# Patient Record
Sex: FEMALE | Race: ASIAN | NOT HISPANIC OR LATINO | Employment: OTHER | URBAN - METROPOLITAN AREA
[De-identification: names, ages, dates, MRNs, and addresses within clinical notes are randomized per-mention and may not be internally consistent; named-entity substitution may affect disease eponyms.]

---

## 2017-02-09 ENCOUNTER — APPOINTMENT (OUTPATIENT)
Dept: RADIOLOGY | Facility: MEDICAL CENTER | Age: 62
End: 2017-02-09
Attending: ORTHOPAEDIC SURGERY
Payer: COMMERCIAL

## 2017-02-09 DIAGNOSIS — M25.562 LEFT KNEE PAIN, UNSPECIFIED CHRONICITY: ICD-10-CM

## 2017-02-09 PROCEDURE — 73721 MRI JNT OF LWR EXTRE W/O DYE: CPT | Mod: LT

## 2017-02-11 ENCOUNTER — HOSPITAL ENCOUNTER (OUTPATIENT)
Dept: LAB | Facility: MEDICAL CENTER | Age: 62
End: 2017-02-11
Attending: FAMILY MEDICINE
Payer: COMMERCIAL

## 2017-02-11 DIAGNOSIS — G31.84 MILD COGNITIVE IMPAIRMENT: ICD-10-CM

## 2017-02-11 DIAGNOSIS — E55.9 VITAMIN D INSUFFICIENCY: ICD-10-CM

## 2017-02-11 DIAGNOSIS — I77.9 RIGHT-SIDED CAROTID ARTERY DISEASE (HCC): ICD-10-CM

## 2017-02-11 DIAGNOSIS — E78.5 HYPERLIPIDEMIA: ICD-10-CM

## 2017-02-11 LAB
25(OH)D3 SERPL-MCNC: 43 NG/ML (ref 30–100)
ALBUMIN SERPL BCP-MCNC: 4.3 G/DL (ref 3.2–4.9)
ALBUMIN/GLOB SERPL: 1.3 G/DL
ALP SERPL-CCNC: 83 U/L (ref 30–99)
ALT SERPL-CCNC: 21 U/L (ref 2–50)
ANION GAP SERPL CALC-SCNC: 9 MMOL/L (ref 0–11.9)
AST SERPL-CCNC: 22 U/L (ref 12–45)
BASOPHILS # BLD AUTO: 0.01 K/UL (ref 0–0.12)
BASOPHILS NFR BLD AUTO: 0.2 % (ref 0–1.8)
BILIRUB SERPL-MCNC: 0.6 MG/DL (ref 0.1–1.5)
BUN SERPL-MCNC: 15 MG/DL (ref 8–22)
CALCIUM SERPL-MCNC: 10.1 MG/DL (ref 8.5–10.5)
CHLORIDE SERPL-SCNC: 102 MMOL/L (ref 96–112)
CHOLEST SERPL-MCNC: 146 MG/DL (ref 100–199)
CO2 SERPL-SCNC: 29 MMOL/L (ref 20–33)
CREAT SERPL-MCNC: 0.82 MG/DL (ref 0.5–1.4)
EOSINOPHIL # BLD: 0.01 K/UL (ref 0–0.51)
EOSINOPHIL NFR BLD AUTO: 0.2 % (ref 0–6.9)
ERYTHROCYTE [DISTWIDTH] IN BLOOD BY AUTOMATED COUNT: 40.9 FL (ref 35.9–50)
FOLATE SERPL-MCNC: >23.6 NG/ML
GLOBULIN SER CALC-MCNC: 3.4 G/DL (ref 1.9–3.5)
GLUCOSE SERPL-MCNC: 113 MG/DL (ref 65–99)
HCT VFR BLD AUTO: 48.3 % (ref 37–47)
HDLC SERPL-MCNC: 69 MG/DL
HGB BLD-MCNC: 15.1 G/DL (ref 12–16)
IMM GRANULOCYTES # BLD AUTO: 0.02 K/UL (ref 0–0.11)
IMM GRANULOCYTES NFR BLD AUTO: 0.5 % (ref 0–0.9)
LDLC SERPL CALC-MCNC: 53 MG/DL
LYMPHOCYTES # BLD: 1.35 K/UL (ref 1–4.8)
LYMPHOCYTES NFR BLD AUTO: 30.5 % (ref 22–41)
MCH RBC QN AUTO: 30 PG (ref 27–33)
MCHC RBC AUTO-ENTMCNC: 31.3 G/DL (ref 33.6–35)
MCV RBC AUTO: 95.8 FL (ref 81.4–97.8)
MONOCYTES # BLD: 0.5 K/UL (ref 0–0.85)
MONOCYTES NFR BLD AUTO: 11.3 % (ref 0–13.4)
NEUTROPHILS # BLD: 2.54 K/UL (ref 2–7.15)
NEUTROPHILS NFR BLD AUTO: 57.3 % (ref 44–72)
NRBC # BLD AUTO: 0 K/UL
NRBC BLD-RTO: 0 /100 WBC
PLATELET # BLD AUTO: 283 K/UL (ref 164–446)
PMV BLD AUTO: 10 FL (ref 9–12.9)
POTASSIUM SERPL-SCNC: 4.1 MMOL/L (ref 3.6–5.5)
PROT SERPL-MCNC: 7.7 G/DL (ref 6–8.2)
RBC # BLD AUTO: 5.04 M/UL (ref 4.2–5.4)
SODIUM SERPL-SCNC: 140 MMOL/L (ref 135–145)
TRIGL SERPL-MCNC: 120 MG/DL (ref 0–149)
TSH SERPL DL<=0.005 MIU/L-ACNC: 3.66 UIU/ML (ref 0.3–3.7)
VIT B12 SERPL-MCNC: 416 PG/ML (ref 211–911)
WBC # BLD AUTO: 4.4 K/UL (ref 4.8–10.8)

## 2017-02-11 PROCEDURE — 84443 ASSAY THYROID STIM HORMONE: CPT

## 2017-02-11 PROCEDURE — 80053 COMPREHEN METABOLIC PANEL: CPT

## 2017-02-11 PROCEDURE — 82306 VITAMIN D 25 HYDROXY: CPT

## 2017-02-11 PROCEDURE — 36415 COLL VENOUS BLD VENIPUNCTURE: CPT

## 2017-02-11 PROCEDURE — 82607 VITAMIN B-12: CPT

## 2017-02-11 PROCEDURE — 82746 ASSAY OF FOLIC ACID SERUM: CPT

## 2017-02-11 PROCEDURE — 80061 LIPID PANEL: CPT

## 2017-02-11 PROCEDURE — 85025 COMPLETE CBC W/AUTO DIFF WBC: CPT

## 2017-02-22 ENCOUNTER — OFFICE VISIT (OUTPATIENT)
Dept: MEDICAL GROUP | Facility: MEDICAL CENTER | Age: 62
End: 2017-02-22
Payer: COMMERCIAL

## 2017-02-22 VITALS
TEMPERATURE: 98 F | HEIGHT: 66 IN | BODY MASS INDEX: 31.82 KG/M2 | SYSTOLIC BLOOD PRESSURE: 102 MMHG | OXYGEN SATURATION: 97 % | WEIGHT: 198 LBS | DIASTOLIC BLOOD PRESSURE: 68 MMHG | RESPIRATION RATE: 16 BRPM | HEART RATE: 94 BPM

## 2017-02-22 DIAGNOSIS — M25.562 CHRONIC PAIN OF LEFT KNEE: ICD-10-CM

## 2017-02-22 DIAGNOSIS — E78.00 PURE HYPERCHOLESTEROLEMIA: Chronic | ICD-10-CM

## 2017-02-22 DIAGNOSIS — M85.80 OSTEOPENIA: ICD-10-CM

## 2017-02-22 DIAGNOSIS — G89.29 CHRONIC PAIN OF LEFT KNEE: ICD-10-CM

## 2017-02-22 DIAGNOSIS — I25.10 CORONARY ARTERY DISEASE INVOLVING NATIVE CORONARY ARTERY OF NATIVE HEART WITHOUT ANGINA PECTORIS: Chronic | ICD-10-CM

## 2017-02-22 DIAGNOSIS — Z12.31 ENCOUNTER FOR SCREENING MAMMOGRAM FOR BREAST CANCER: ICD-10-CM

## 2017-02-22 DIAGNOSIS — Z00.00 ANNUAL PHYSICAL EXAM: ICD-10-CM

## 2017-02-22 DIAGNOSIS — Z87.81 S/P ORIF (OPEN REDUCTION INTERNAL FIXATION) FRACTURE: ICD-10-CM

## 2017-02-22 DIAGNOSIS — R73.01 ELEVATED FASTING BLOOD SUGAR: ICD-10-CM

## 2017-02-22 DIAGNOSIS — E55.9 VITAMIN D INSUFFICIENCY: Chronic | ICD-10-CM

## 2017-02-22 DIAGNOSIS — E66.9 OBESITY (BMI 30.0-34.9): ICD-10-CM

## 2017-02-22 DIAGNOSIS — Z12.11 COLON CANCER SCREENING: ICD-10-CM

## 2017-02-22 DIAGNOSIS — K21.9 GASTROESOPHAGEAL REFLUX DISEASE, ESOPHAGITIS PRESENCE NOT SPECIFIED: Chronic | ICD-10-CM

## 2017-02-22 DIAGNOSIS — Z12.4 CERVICAL CANCER SCREENING: ICD-10-CM

## 2017-02-22 DIAGNOSIS — Z98.890 S/P ORIF (OPEN REDUCTION INTERNAL FIXATION) FRACTURE: ICD-10-CM

## 2017-02-22 PROCEDURE — 99396 PREV VISIT EST AGE 40-64: CPT | Performed by: FAMILY MEDICINE

## 2017-02-22 RX ORDER — ATORVASTATIN CALCIUM 20 MG/1
20 TABLET, FILM COATED ORAL
Qty: 90 TAB | Refills: 3 | Status: SHIPPED | OUTPATIENT
Start: 2017-02-22 | End: 2017-02-22 | Stop reason: SDUPTHER

## 2017-02-22 RX ORDER — ASPIRIN 81 MG/1
81 TABLET, CHEWABLE ORAL
COMMUNITY
End: 2020-03-02

## 2017-02-22 RX ORDER — ATORVASTATIN CALCIUM 20 MG/1
20 TABLET, FILM COATED ORAL
Qty: 90 TAB | Refills: 3 | Status: SHIPPED | OUTPATIENT
Start: 2017-02-22 | End: 2018-02-23 | Stop reason: SDUPTHER

## 2017-02-22 RX ORDER — ALENDRONATE SODIUM 70 MG/1
70 TABLET ORAL
Qty: 12 TAB | Refills: 3 | Status: SHIPPED | OUTPATIENT
Start: 2017-02-22 | End: 2017-02-22 | Stop reason: SDUPTHER

## 2017-02-22 RX ORDER — ALENDRONATE SODIUM 70 MG/1
70 TABLET ORAL
Qty: 12 TAB | Refills: 3 | Status: SHIPPED | OUTPATIENT
Start: 2017-02-22 | End: 2018-02-23 | Stop reason: SDUPTHER

## 2017-02-22 ASSESSMENT — PATIENT HEALTH QUESTIONNAIRE - PHQ9: CLINICAL INTERPRETATION OF PHQ2 SCORE: 0

## 2017-02-22 NOTE — MR AVS SNAPSHOT
"        Uzma Luciocourtney   2017 8:40 AM   Office Visit   MRN: [id number]    Department:  [facility]   Dept Phone:  [phone]    Description:  Female : 1955   Provider:  Nabil Cifuentes M.D.           Reason for Visit     Annual Exam           Allergies as of 2017     Allergen Noted Reactions    Nkda [No Known Drug Allergy] 2010         You were diagnosed with     Annual physical exam   [276928]       S/P ORIF (open reduction internal fixation) fracture   [802015]       Chronic pain of left knee   [060091]       Osteopenia   [742867]       Vitamin D insufficiency   [523296]       Pure hypercholesterolemia   [272.0.ICD-9-CM]       Elevated fasting blood sugar   [869261]       Obesity (BMI 30.0-34.9)   [624217]       Colon cancer screening   [714672]       Encounter for screening mammogram for breast cancer   [5151877]       Cervical cancer screening   [978571]       Gastroesophageal reflux disease, esophagitis presence not specified   [9815223]       Coronary artery disease involving native coronary artery of native heart without angina pectoris   [0091946]         Vital Signs     Blood Pressure Pulse Temperature Respirations Height Weight    102/68 mmHg 94 36.7 °C (98 °F) 16 1.676 m (5' 5.98\") 89.812 kg (198 lb)    Body Mass Index Oxygen Saturation Smoking Status             31.97 kg/m2 97% Former Smoker         Basic Information     Date Of Birth Sex Race Ethnicity Preferred Language    1955 Female Other Non- English      Problem List              ICD-10-CM Priority Class Noted - Resolved    Previous back surgery Z98.890   2009 - Present    H/O: hysterectomy Z90.710   2009 - Present    Left knee pain M25.562   3/26/2014 - Present    Hx of cataract surgery Z98.49   3/26/2014 - Present    Hyperlipidemia (Chronic) E78.5   3/26/2014 - Present    Hyperpigmentation of skin L81.9   10/28/2014 - Present    Rash R21   2015 - Present    Osteopenia M85.80   10/12/2015 - " Present    Obesity (BMI 30.0-34.9) E66.9   10/12/2015 - Present    Left sided sciatica M54.32   11/17/2015 - Present    Vitamin D insufficiency (Chronic) E55.9   2/12/2016 - Present    S/P ORIF (open reduction internal fixation) fracture Z96.7, Z87.81   12/2/2016 - Present    CAD (coronary artery disease) (Chronic) I25.10   12/5/2016 - Present    GERD (gastroesophageal reflux disease) (Chronic) K21.9   12/6/2016 - Present    Elevated fasting blood sugar R73.01   2/22/2017 - Present      Health Maintenance        Date Due Completion Dates    PAP SMEAR 9/8/2013 9/8/2010    MAMMOGRAM 3/8/2017 3/8/2016, 4/9/2014, 12/18/2012, 11/7/2011, 8/24/2010, 8/24/2010, 5/7/2009, 5/7/2009    COLONOSCOPY 12/6/2017 12/6/2007 (Done)    Override on 12/6/2007: Done    IMM DTaP/Tdap/Td Vaccine (2 - Td) 10/12/2025 10/12/2015            Current Immunizations     Hepatitis A Vaccine, Adult 9/26/2016, 10/12/2015    Hepatitis B Vaccine Recombivax (Adol/Adult) 9/26/2016, 12/11/2015, 10/12/2015    Influenza TIV (IM) 10/16/2013, 11/26/2012, 10/24/2011, 10/28/2010    Influenza Vaccine Quad Inj (Preserved) 10/12/2016, 10/12/2015, 10/28/2014 10:17 AM    SHINGLES VACCINE 9/24/2014 10:45 AM    Tdap Vaccine 10/12/2015      Below and/or attached are the medications your provider expects you to take. Review all of your home medications and newly ordered medications with your provider and/or pharmacist. Follow medication instructions as directed by your provider and/or pharmacist. Please keep your medication list with you and share with your provider. Update the information when medications are discontinued, doses are changed, or new medications (including over-the-counter products) are added; and carry medication information at all times in the event of emergency situations     Allergies:  NKDA - (reactions not documented)               Medications  Valid as of: February 22, 2017 - 10:04 AM    Generic Name Brand Name Tablet Size Instructions for use     Alendronate Sodium (Tab) FOSAMAX 70 MG Take 1 Tab by mouth every 7 days.        Aspirin (Chew Tab) ASA 81 MG Take 81 mg by mouth every day.        Atorvastatin Calcium (Tab) LIPITOR 20 MG Take 1 Tab by mouth every bedtime.        Calcium Carbonate-Vitamin D (Tab) Calcium Carbonate-Vitamin D 600-400 MG-UNIT Take 1 Tab by mouth 2 Times a Day.        Cetirizine HCl (Tab) ZYRTEC 10 MG Take 10 mg by mouth every day.        Cholecalciferol (Tab) cholecalciferol 1000 UNIT Take 2,000 Units by mouth every day.        Estrogens, Conjugated (Cream) PREMARIN 0.625 MG/GM Insert 0.5 g in vagina every day.        Omega-3 Fatty Acids   Take 1,500 mg by mouth every day.        .                 Medicines prescribed today were sent to:     [facility]/PHARMACY #[id number] - [place] [facility] [address] [facility] [email or web address] [facility]    [address] [facility] [place] [address]    Phone: [phone] Fax: [phone]    Open 24 Hours?: No    [facility] [place], [place] - [address] AT PORTAL TO REGISTERED [facility] SITES    [address] [facility] [address]    Phone: [phone] Fax: [phone]    Open 24 Hours?: No      Medication refill instructions:       If your prescription bottle indicates you have medication refills left, it is not necessary to call your provider’s office. Please contact your pharmacy and they will refill your medication.    If your prescription bottle indicates you do not have any refills left, you may request refills at any time through one of the following ways: The online [employer] system (except Urgent Care), by calling your provider’s office, or by asking your pharmacy to contact your provider’s office with a refill request. Medication refills are processed only during regular business hours and may not be available until the next business day. Your provider may request additional information or to have a follow-up visit with you prior to refilling your medication.   *Please Note: Medication refills are assigned a new Rx  number when refilled electronically. Your pharmacy may indicate that no refills were authorized even though a new prescription for the same medication is available at the pharmacy. Please request the medicine by name with the pharmacy before contacting your provider for a refill.        Your To Do List     Future Labs/Procedures Complete By Expires    CBC WITH DIFFERENTIAL  As directed 2/23/2018    COMP METABOLIC PANEL  As directed 2/23/2018    HEMOGLOBIN A1C  As directed 2/23/2018    LIPID PROFILE  As directed 2/23/2018    MA-SCREEN MAMMO W/CAD-BILAT  As directed 3/26/2018    TSH WITH REFLEX TO FT4  As directed 2/22/2018    VITAMIN D,25 HYDROXY  As directed 2/23/2018      Referral     A referral request has been sent to our patient care coordination department. Please allow 3-5 business days for us to process this request and contact you either by phone or mail. If you do not hear from us by the 5th business day, please call us at [phone].           Inbox Access Code: Activation code not generated  Current Inbox Status: Active
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
## 2017-04-18 ENCOUNTER — OFFICE VISIT (OUTPATIENT)
Dept: MEDICAL GROUP | Facility: MEDICAL CENTER | Age: 62
End: 2017-04-18
Payer: COMMERCIAL

## 2017-04-18 VITALS
TEMPERATURE: 97.6 F | DIASTOLIC BLOOD PRESSURE: 64 MMHG | WEIGHT: 196 LBS | OXYGEN SATURATION: 96 % | SYSTOLIC BLOOD PRESSURE: 104 MMHG | BODY MASS INDEX: 31.5 KG/M2 | HEART RATE: 116 BPM | HEIGHT: 66 IN

## 2017-04-18 DIAGNOSIS — J06.9 VIRAL URI WITH COUGH: ICD-10-CM

## 2017-04-18 DIAGNOSIS — Z91.09 ALLERGY TO POLLEN: ICD-10-CM

## 2017-04-18 PROCEDURE — 99214 OFFICE O/P EST MOD 30 MIN: CPT | Performed by: NURSE PRACTITIONER

## 2017-04-18 RX ORDER — CODEINE PHOSPHATE/GUAIFENESIN 10-100MG/5
5 LIQUID (ML) ORAL 3 TIMES DAILY PRN
Qty: 120 ML | Refills: 0 | Status: SHIPPED | OUTPATIENT
Start: 2017-04-18 | End: 2018-02-23

## 2017-04-18 RX ORDER — BENZONATATE 100 MG/1
100 CAPSULE ORAL 3 TIMES DAILY PRN
Qty: 30 CAP | Refills: 0 | Status: SHIPPED | OUTPATIENT
Start: 2017-04-18 | End: 2018-02-23

## 2017-04-18 NOTE — MR AVS SNAPSHOT
"        Uzma Gamez   2017 1:45 PM   Office Visit   MRN: [id number]    Department:  [facility]   Dept Phone:  [phone]    Description:  Female : 1955   Provider:  ROZ Ackerman           Allergies as of 2017     Allergen Noted Reactions    Nkda [No Known Drug Allergy] 2010         You were diagnosed with     Viral URI with cough   [537286]   zyrtec or allergra daily with flonase for at least 3 weeks.  tessalon perles and soledad w/cod prn cough.  call by late thurs if any thick yellow secretions.      Allergy to pollen   [177811]   if has yellow secretions by thurs will order antibx.        Vital Signs     Blood Pressure Pulse Temperature Height Weight Body Mass Index    104/64 mmHg 116 36.4 °C (97.6 °F) 1.676 m (5' 6\") 88.905 kg (196 lb) 31.65 kg/m2    Oxygen Saturation Breastfeeding? Smoking Status             96% No Former Smoker         Basic Information     Date Of Birth Sex Race Ethnicity Preferred Language    1955 Female Other Non- English      Your appointments     2017 11:40 AM   MA SCRN10 with EUSEBIA SANDERS MG 1   [facility] ([place])    [address]  [facility] [id number] [phone]           No deodorant, powder, perfume or lotion under the arm or breast area.              Problem List              ICD-10-CM Priority Class Noted - Resolved    Previous back surgery Z98.890   2009 - Present    H/O: hysterectomy Z90.710   2009 - Present    Left knee pain M25.562   3/26/2014 - Present    Hx of cataract surgery Z98.49   3/26/2014 - Present    Hyperlipidemia (Chronic) E78.5   3/26/2014 - Present    Hyperpigmentation of skin L81.9   10/28/2014 - Present    Rash R21   2015 - Present    Osteopenia M85.80   10/12/2015 - Present    Obesity (BMI 30.0-34.9) E66.9   10/12/2015 - Present    Left sided sciatica M54.32   2015 - Present    Vitamin D insufficiency (Chronic) E55.9   " 2/12/2016 - Present    S/P ORIF (open reduction internal fixation) fracture Z96.7, Z87.81   12/2/2016 - Present    CAD (coronary artery disease) (Chronic) I25.10   12/5/2016 - Present    GERD (gastroesophageal reflux disease) (Chronic) K21.9   12/6/2016 - Present    Elevated fasting blood sugar R73.01   2/22/2017 - Present      Health Maintenance        Date Due Completion Dates    PAP SMEAR 9/8/2013 9/8/2010    MAMMOGRAM 3/8/2017 3/8/2016, 4/9/2014, 12/18/2012, 11/7/2011, 8/24/2010, 8/24/2010, 5/7/2009, 5/7/2009    COLONOSCOPY 12/6/2017 12/6/2007 (Done)    Override on 12/6/2007: Done    IMM DTaP/Tdap/Td Vaccine (2 - Td) 10/12/2025 10/12/2015            Current Immunizations     Hepatitis A Vaccine, Adult 9/26/2016, 10/12/2015    Hepatitis B Vaccine Recombivax (Adol/Adult) 9/26/2016, 12/11/2015, 10/12/2015    Influenza TIV (IM) 10/16/2013, 11/26/2012, 10/24/2011, 10/28/2010    Influenza Vaccine Quad Inj (Preserved) 10/12/2016, 10/12/2015, 10/28/2014 10:17 AM    SHINGLES VACCINE 9/24/2014 10:45 AM    Tdap Vaccine 10/12/2015      Below and/or attached are the medications your provider expects you to take. Review all of your home medications and newly ordered medications with your provider and/or pharmacist. Follow medication instructions as directed by your provider and/or pharmacist. Please keep your medication list with you and share with your provider. Update the information when medications are discontinued, doses are changed, or new medications (including over-the-counter products) are added; and carry medication information at all times in the event of emergency situations     Allergies:  NKDA - (reactions not documented)               Medications  Valid as of: April 18, 2017 -  2:26 PM    Generic Name Brand Name Tablet Size Instructions for use    Alendronate Sodium (Tab) FOSAMAX 70 MG Take 1 Tab by mouth every 7 days.        Aspirin (Chew Tab) ASA 81 MG Take 81 mg by mouth every day.        Atorvastatin  Calcium (Tab) LIPITOR 20 MG Take 1 Tab by mouth every bedtime.        Benzonatate (Cap) TESSALON 100 MG Take 1 Cap by mouth 3 times a day as needed.        Calcium Carbonate-Vitamin D (Tab) Calcium Carbonate-Vitamin D 600-400 MG-UNIT Take 1 Tab by mouth 2 Times a Day.        Cetirizine HCl (Tab) ZYRTEC 10 MG Take 10 mg by mouth every day.        Cholecalciferol (Tab) cholecalciferol 1000 UNIT Take 2,000 Units by mouth every day.        Estrogens, Conjugated (Cream) PREMARIN 0.625 MG/GM Insert 0.5 g in vagina every day.        Guaifenesin-Codeine (Syrup) TUSSI-ORGANIDIN -10 MG/5ML Take 5 mL by mouth 3 times a day as needed.        Omega-3 Fatty Acids   Take 1,500 mg by mouth every day.        .                 Medicines prescribed today were sent to:     [facility]/PHARMACY #[id number] - [place], [place] - [address]    [address] [place] [place] [address]    Phone: [phone] Fax: [phone]    Open 24 Hours?: No    [facility] - [place], [place] - [address] AT PORTAL TO REGISTERED [facility] SITES    [address] [facility] [address]    Phone: [phone] Fax: [phone]    Open 24 Hours?: No      Medication refill instructions:       If your prescription bottle indicates you have medication refills left, it is not necessary to call your provider’s office. Please contact your pharmacy and they will refill your medication.    If your prescription bottle indicates you do not have any refills left, you may request refills at any time through one of the following ways: The online [employer] system (except Urgent Care), by calling your provider’s office, or by asking your pharmacy to contact your provider’s office with a refill request. Medication refills are processed only during regular business hours and may not be available until the next business day. Your provider may request additional information or to have a follow-up visit with you prior to refilling your medication.   *Please Note:  Medication refills are assigned a new Rx number when refilled electronically. Your pharmacy may indicate that no refills were authorized even though a new prescription for the same medication is available at the pharmacy. Please request the medicine by name with the pharmacy before contacting your provider for a refill.        Instructions    1. Viral URI with cough  guaifenesin-codeine (TUSSI-ORGANIDIN NR) 100-10 MG/5ML syrup    benzonatate (TESSALON) 100 MG Cap    zyrtec or allergra daily with flonase for at least 3 weeks.  tessalon perles and soledad w/cod prn cough.  call by late thurs if any thick yellow secretions.     2. Allergy to pollen  guaifenesin-codeine (TUSSI-ORGANIDIN NR) 100-10 MG/5ML syrup    benzonatate (TESSALON) 100 MG Cap    if has yellow secretions by thurs will order antibx.               [employer] Access Code: Activation code not generated  Current [employer] Status: Active

## 2017-04-18 NOTE — PROGRESS NOTES
Subjective:      Uzma Gamez is a 62 y.o. female who presents with No chief complaint on file.            HPI  Seen in f/u for URI.  Started on sunday.  Was ill 1 month ago with same sx.  Lasted 3 weeks.  Then got better but now returned.  Was only better for a few days.    + aching all over.  Ribs hurt with the coughing.  Nasal secretions are clear.  + headache.  + sinus pressure and ear pressure.  + dry and sore throat.  + wheezing but no SOB.  Coughing all hte time.  ? Color of secretions.  No know fever.  + sweating yesterday.  + fatigue.  No n/v,d.      Patient Active Problem List    Diagnosis Date Noted   • Elevated fasting blood sugar 02/22/2017   • GERD (gastroesophageal reflux disease) 12/06/2016   • CAD (coronary artery disease) 12/05/2016   • S/P ORIF (open reduction internal fixation) fracture 12/02/2016   • Vitamin D insufficiency 02/12/2016   • Left sided sciatica 11/17/2015   • Osteopenia 10/12/2015   • Obesity (BMI 30.0-34.9) 10/12/2015   • Rash 08/28/2015   • Hyperpigmentation of skin 10/28/2014   • Left knee pain 03/26/2014   • Hx of cataract surgery 03/26/2014   • Hyperlipidemia 03/26/2014   • Previous back surgery 06/05/2009   • H/O: hysterectomy 06/05/2009     Current Outpatient Prescriptions   Medication Sig Dispense Refill   • aspirin (ASA) 81 MG Chew Tab chewable tablet Take 81 mg by mouth every day.     • atorvastatin (LIPITOR) 20 MG Tab Take 1 Tab by mouth every bedtime. 90 Tab 3   • alendronate (FOSAMAX) 70 MG Tab Take 1 Tab by mouth every 7 days. 12 Tab 3   • Omega-3 Fatty Acids (OMEGA 3 PO) Take 1,500 mg by mouth every day.     • conjugated estrogen (PREMARIN) 0.625 MG/GM Cream Insert 0.5 g in vagina every day.     • cetirizine (ZYRTEC) 10 MG Tab Take 10 mg by mouth every day.     • Calcium Carbonate-Vitamin D 600-400 MG-UNIT Tab Take 1 Tab by mouth 2 Times a Day.     • vitamin D (CHOLECALCIFEROL) 1000 UNIT Tab Take 2,000 Units by mouth every day.       No current  "facility-administered medications for this visit.     Allergies   Allergen Reactions   • Nkda [No Known Drug Allergy]        ROS  Review of Systems   Constitutional: Negative.  Negative for fever, weight loss.   HENT: Negative.  Negative for hearing loss, ear pain, nosebleeds, neck pain, tinnitus and ear discharge.    Respiratory: Negative.  Negative for hemoptysis, wheezing and stridor.    Cardiovascular: Negative.  Negative for chest pain, palpitations, orthopnea, claudication, leg swelling and PND.   Gastrointestinal: denies nausea, vomiting, diarrhea, constipation, heartburn, melena or hematochezia.  Genitourinary: Denies dysuria, hematuria, urinary incontinence, frequency or urgency.             Objective:     /64 mmHg  Pulse 116  Temp(Src) 36.4 °C (97.6 °F)  Ht 1.676 m (5' 6\")  Wt 88.905 kg (196 lb)  BMI 31.65 kg/m2  SpO2 96%  Breastfeeding? No     Physical Exam      Physical Exam   Vitals reviewed.  Constitutional: oriented to person, place, and time. appears well-developed and well-nourished. No distress.   HENT:  Head: Normocephalic and atraumatic. Right Ear: External ear normal. Left Ear: External ear normal. Nose: Nose normal. Mouth/Throat: Oropharynx is clear, red and moist. No oropharyngeal exudate.  johann tm wnl.  Eyes: Right eye exhibits no discharge. Left eye exhibits no discharge. No scleral icterus.  Neck: No JVD present.   Cardiovascular: Normal rate, regular rhythm, normal heart sounds and intact distal pulses.  Exam reveals no gallop and no friction rub.  No murmur heard.  No carotid bruits.   Pulmonary/Chest: Effort normal and breath sounds normal. No stridor. No respiratory distress. no wheezes or rales. exhibits no tenderness.   Musculoskeletal: Normal range of motion. exhibits no edema. johann pedal pulses 2+.  Lymphadenopathy: no supraclavicular adenopathy.  tender palp rt tonsillar lymph node.   Neurological: alert and oriented to person, place, and time. exhibits normal muscle " tone. Coordination normal.   Skin: Skin is warm and dry. no diaphoresis.   Psychiatric: normal mood and affect. behavior is normal.            Assessment/Plan:     1. Viral URI with cough  guaifenesin-codeine (TUSSI-ORGANIDIN NR) 100-10 MG/5ML syrup    benzonatate (TESSALON) 100 MG Cap    zyrtec or allergra daily with flonase for at least 3 weeks.  tessalon perles and soledad w/cod prn cough.  call by late thurs if any thick yellow secretions.     2. Allergy to pollen  guaifenesin-codeine (TUSSI-ORGANIDIN NR) 100-10 MG/5ML syrup    benzonatate (TESSALON) 100 MG Cap    if has yellow secretions by thurs will order antibx.

## 2017-04-18 NOTE — PATIENT INSTRUCTIONS
1. Viral URI with cough  guaifenesin-codeine (TUSSI-ORGANIDIN NR) 100-10 MG/5ML syrup    benzonatate (TESSALON) 100 MG Cap    zyrtec or allergra daily with flonase for at least 3 weeks.  tessalon perles and soledad w/cod prn cough.  call by late thurs if any thick yellow secretions.     2. Allergy to pollen  guaifenesin-codeine (TUSSI-ORGANIDIN NR) 100-10 MG/5ML syrup    benzonatate (TESSALON) 100 MG Cap    if has yellow secretions by thurs will order antibx.

## 2017-04-19 ENCOUNTER — TELEPHONE (OUTPATIENT)
Dept: MEDICAL GROUP | Facility: MEDICAL CENTER | Age: 62
End: 2017-04-19

## 2017-04-19 DIAGNOSIS — J01.80 OTHER ACUTE SINUSITIS: ICD-10-CM

## 2017-04-19 RX ORDER — AZITHROMYCIN 250 MG/1
TABLET, FILM COATED ORAL
Qty: 6 TAB | Refills: 0 | Status: SHIPPED | OUTPATIENT
Start: 2017-04-19 | End: 2017-04-24

## 2017-04-19 NOTE — TELEPHONE ENCOUNTER
1. Caller Name: Pt                      Call Back Number: [phone]    2. Message: Pt called stating she has thick green and yellow mucous now and [facility].     3. Patient approves office to leave a detailed voicemail/MyChart message: yes

## 2017-04-20 ENCOUNTER — HOSPITAL ENCOUNTER (OUTPATIENT)
Dept: RADIOLOGY | Facility: MEDICAL CENTER | Age: 62
End: 2017-04-20
Attending: FAMILY MEDICINE
Payer: COMMERCIAL

## 2017-04-20 DIAGNOSIS — Z12.31 ENCOUNTER FOR SCREENING MAMMOGRAM FOR BREAST CANCER: ICD-10-CM

## 2017-04-20 PROCEDURE — G0202 SCR MAMMO BI INCL CAD: HCPCS

## 2017-09-20 ENCOUNTER — NON-PROVIDER VISIT (OUTPATIENT)
Dept: MEDICAL GROUP | Facility: MEDICAL CENTER | Age: 62
End: 2017-09-20
Payer: COMMERCIAL

## 2017-09-20 DIAGNOSIS — Z23 NEEDS FLU SHOT: ICD-10-CM

## 2017-09-20 PROCEDURE — 90686 IIV4 VACC NO PRSV 0.5 ML IM: CPT | Performed by: NURSE PRACTITIONER

## 2017-09-20 PROCEDURE — 90471 IMMUNIZATION ADMIN: CPT | Performed by: NURSE PRACTITIONER

## 2017-09-20 NOTE — PROGRESS NOTES
"Uzma Gamez is a 62 y.o. female here for a non-provider visit for:   FLU    Reason for immunization: Annual Flu Vaccine  Immunization records indicate need for vaccine: Yes, confirmed with Epic  Minimum interval has been met for this vaccine: Yes  ABN completed: No    Order and dose verified by: RUTHANN GOLDSTEIN Dated  08/07/2015 was given to patient: Yes  All IAC Questionnaire questions were answered \"No.\"    Patient tolerated injection and no adverse effects were observed or reported: Yes    Pt scheduled for next dose in series: No    "

## 2017-10-24 ENCOUNTER — OFFICE VISIT (OUTPATIENT)
Dept: MEDICAL GROUP | Facility: MEDICAL CENTER | Age: 62
End: 2017-10-24
Payer: COMMERCIAL

## 2017-10-24 ENCOUNTER — HOSPITAL ENCOUNTER (OUTPATIENT)
Dept: LAB | Facility: MEDICAL CENTER | Age: 62
End: 2017-10-24
Attending: FAMILY MEDICINE
Payer: COMMERCIAL

## 2017-10-24 ENCOUNTER — HOSPITAL ENCOUNTER (OUTPATIENT)
Dept: RADIOLOGY | Facility: MEDICAL CENTER | Age: 62
End: 2017-10-24
Attending: FAMILY MEDICINE
Payer: COMMERCIAL

## 2017-10-24 VITALS
BODY MASS INDEX: 33.82 KG/M2 | OXYGEN SATURATION: 95 % | RESPIRATION RATE: 16 BRPM | HEIGHT: 66 IN | HEART RATE: 79 BPM | DIASTOLIC BLOOD PRESSURE: 80 MMHG | SYSTOLIC BLOOD PRESSURE: 122 MMHG | WEIGHT: 210.4 LBS | TEMPERATURE: 98.1 F

## 2017-10-24 DIAGNOSIS — M25.561 ACUTE PAIN OF RIGHT KNEE: ICD-10-CM

## 2017-10-24 DIAGNOSIS — M25.551 RIGHT HIP PAIN: ICD-10-CM

## 2017-10-24 LAB
BASOPHILS # BLD AUTO: 0.8 % (ref 0–1.8)
BASOPHILS # BLD: 0.06 K/UL (ref 0–0.12)
EOSINOPHIL # BLD AUTO: 0.23 K/UL (ref 0–0.51)
EOSINOPHIL NFR BLD: 3.1 % (ref 0–6.9)
ERYTHROCYTE [DISTWIDTH] IN BLOOD BY AUTOMATED COUNT: 39.6 FL (ref 35.9–50)
HCT VFR BLD AUTO: 44.9 % (ref 37–47)
HGB BLD-MCNC: 14.3 G/DL (ref 12–16)
IMM GRANULOCYTES # BLD AUTO: 0.02 K/UL (ref 0–0.11)
IMM GRANULOCYTES NFR BLD AUTO: 0.3 % (ref 0–0.9)
LYMPHOCYTES # BLD AUTO: 2.49 K/UL (ref 1–4.8)
LYMPHOCYTES NFR BLD: 33.8 % (ref 22–41)
MCH RBC QN AUTO: 30 PG (ref 27–33)
MCHC RBC AUTO-ENTMCNC: 31.8 G/DL (ref 33.6–35)
MCV RBC AUTO: 94.3 FL (ref 81.4–97.8)
MONOCYTES # BLD AUTO: 0.57 K/UL (ref 0–0.85)
MONOCYTES NFR BLD AUTO: 7.7 % (ref 0–13.4)
NEUTROPHILS # BLD AUTO: 3.99 K/UL (ref 2–7.15)
NEUTROPHILS NFR BLD: 54.3 % (ref 44–72)
NRBC # BLD AUTO: 0 K/UL
NRBC BLD AUTO-RTO: 0 /100 WBC
PLATELET # BLD AUTO: 352 K/UL (ref 164–446)
PMV BLD AUTO: 10.1 FL (ref 9–12.9)
RBC # BLD AUTO: 4.76 M/UL (ref 4.2–5.4)
WBC # BLD AUTO: 7.4 K/UL (ref 4.8–10.8)

## 2017-10-24 PROCEDURE — 84550 ASSAY OF BLOOD/URIC ACID: CPT

## 2017-10-24 PROCEDURE — 36415 COLL VENOUS BLD VENIPUNCTURE: CPT

## 2017-10-24 PROCEDURE — 73564 X-RAY EXAM KNEE 4 OR MORE: CPT | Mod: RT

## 2017-10-24 PROCEDURE — 99214 OFFICE O/P EST MOD 30 MIN: CPT | Performed by: FAMILY MEDICINE

## 2017-10-24 PROCEDURE — 85025 COMPLETE CBC W/AUTO DIFF WBC: CPT

## 2017-10-24 PROCEDURE — 73502 X-RAY EXAM HIP UNI 2-3 VIEWS: CPT | Mod: RT

## 2017-10-24 PROCEDURE — 85652 RBC SED RATE AUTOMATED: CPT

## 2017-10-24 RX ORDER — METHYLPREDNISOLONE 4 MG/1
TABLET ORAL
Qty: 21 TAB | Refills: 0 | Status: SHIPPED | OUTPATIENT
Start: 2017-10-24 | End: 2018-02-23

## 2017-10-25 LAB
ERYTHROCYTE [SEDIMENTATION RATE] IN BLOOD BY WESTERGREN METHOD: 15 MM/HOUR (ref 0–30)
URATE SERPL-MCNC: 5.8 MG/DL (ref 1.9–8.2)

## 2017-10-31 NOTE — PROGRESS NOTES
Subjective:     Chief Complaint   Patient presents with   • Knee Pain       Uzma Gamez is a 62 y.o. female here today for evaluation and management of:    Acute pain of right knee  Patient complains of pain in the right knee over the last couple months. It seems to have worsened recently. Patient did have a ankle fracture on the left a year ago and was on crutches or in a boot for almost 6 months secondary to problems with the healing. Her body mechanics were very off during then. She denies any twisting injury. She denies any swelling or redness. No instability or locking.    Right hip pain  Patient complains of right hip pain. This is been going on for the last 6 months but seems to have worsened recently. She is unsure of its because her body mechanics were off when she was healing from her left ankle fracture.       Allergies   Allergen Reactions   • Nkda [No Known Drug Allergy]        Current medicines (including changes today)  Current Outpatient Prescriptions   Medication Sig Dispense Refill   • MethylPREDNISolone (MEDROL DOSEPAK) 4 MG Tablet Therapy Pack As directed on the packaging label. 21 Tab 0   • guaifenesin-codeine (TUSSI-ORGANIDIN NR) 100-10 MG/5ML syrup Take 5 mL by mouth 3 times a day as needed. 120 mL 0   • benzonatate (TESSALON) 100 MG Cap Take 1 Cap by mouth 3 times a day as needed. 30 Cap 0   • aspirin (ASA) 81 MG Chew Tab chewable tablet Take 81 mg by mouth every day.     • atorvastatin (LIPITOR) 20 MG Tab Take 1 Tab by mouth every bedtime. 90 Tab 3   • alendronate (FOSAMAX) 70 MG Tab Take 1 Tab by mouth every 7 days. 12 Tab 3   • Omega-3 Fatty Acids (OMEGA 3 PO) Take 1,500 mg by mouth every day.     • conjugated estrogen (PREMARIN) 0.625 MG/GM Cream Insert 0.5 g in vagina every day.     • cetirizine (ZYRTEC) 10 MG Tab Take 10 mg by mouth every day.     • Calcium Carbonate-Vitamin D 600-400 MG-UNIT Tab Take 1 Tab by mouth 2 Times a Day.     • vitamin D (CHOLECALCIFEROL) 1000 UNIT Tab Take  "2,000 Units by mouth every day.       No current facility-administered medications for this visit.        She  has a past medical history of Anesthesia; Bilateral cataracts; H/O: hysterectomy (6/5/2009); High cholesterol; Plantar fasciitis (6/5/2009); and Previous back surgery (6/5/2009). She also has no past medical history of Breast cancer (CMS-[facility]).    Patient Active Problem List    Diagnosis Date Noted   • Right hip pain 10/24/2017   • Elevated fasting blood sugar 02/22/2017   • GERD (gastroesophageal reflux disease) 12/06/2016   • CAD (coronary artery disease) 12/05/2016   • S/P ORIF (open reduction internal fixation) fracture 12/02/2016   • Vitamin D insufficiency 02/12/2016   • Left sided sciatica 11/17/2015   • Osteopenia 10/12/2015   • Obesity (BMI 30.0-34.9) 10/12/2015   • Rash 08/28/2015   • Hyperpigmentation of skin 10/28/2014   • Acute pain of right knee 03/26/2014   • Hx of cataract surgery 03/26/2014   • Hyperlipidemia 03/26/2014   • Previous back surgery 06/05/2009   • H/O: hysterectomy 06/05/2009       ROS   No fever or chills.  No nausea or vomiting.  No chest pain or palpitations.  No cough or SOB.  No pain with urination or hematuria.  No black or bloody stools.       Objective:     Blood pressure 122/80, pulse 79, temperature 36.7 °C (98.1 °F), resp. rate 16, height 1.676 m (5' 6\"), weight 95.4 kg (210 lb 6.4 oz), SpO2 95 %. Body mass index is 33.96 kg/m².   Physical Exam:  Well developed, well nourished.  Alert, oriented in no acute distress.  Eye contact is good, speech goal directed, affect calm  Eyes: conjunctiva non-injected, sclera non-icteric.  Knees: No erythema/edema/ecchymosis/effusion. Tenderness to palpation onMedial patella. Joint line tenderness bilaterally. Patellar grind test positive. Lachman's negative. Chang's negative. ROM intact. 5/5 strength bilaterally. 2+ deep tendon reflexes bilaterally.  Hip: Tenderness to palpation on laterally. Normal flexion, extension, but " decreased abduction and adduction ROM. No pain with axial load or internal rotation.    SPINE: No significant spinal curvature on forward bend. Mild tenderness in paraspinous muscles lumbar spine without current spasm. SLT negative. DTR 2+ patella, 1+ achilles bilaterally. Strength 5/5 proximal and distal LE.  No pain with stress of SI. Full hip ROM. Poor hamstring flexibility. No symptoms with axial loading.         Assessment and Plan:   The following treatment plan was discussed    1. Right hip pain  Refer to physical therapy. Consider x-rays if not improving. This sounds like there is some radicular pain. Medrol Dosepak as directed. Refer to physical therapy and if not improving orthopedics  - URIC ACID; Future  - WESTERGREN SED RATE; Future  - MethylPREDNISolone (MEDROL DOSEPAK) 4 MG Tablet Therapy Pack; As directed on the packaging label.  Dispense: 21 Tab; Refill: 0  - REFERRAL TO PHYSICAL THERAPY Reason for Therapy: Eval/Treat/Report  - CBC WITH DIFFERENTIAL; Future    2. Acute pain of right knee  See #1  - DX-KNEE COMPLETE 4+ RIGHT; Future  - URIC ACID; Future  - WESTERGREN SED RATE; Future  - MethylPREDNISolone (MEDROL DOSEPAK) 4 MG Tablet Therapy Pack; As directed on the packaging label.  Dispense: 21 Tab; Refill: 0  - REFERRAL TO PHYSICAL THERAPY Reason for Therapy: Eval/Treat/Report  - CBC WITH DIFFERENTIAL; Future    Any change or worsening of signs or symptoms, patient encouraged to follow-up or report to the emergency room for further evaluation. Patient understands and agrees.    Followup: Return if symptoms worsen or fail to improve.

## 2017-10-31 NOTE — ASSESSMENT & PLAN NOTE
Patient complains of right hip pain. This is been going on for the last 6 months but seems to have worsened recently. She is unsure of its because her body mechanics were off when she was healing from her left ankle fracture.

## 2017-10-31 NOTE — ASSESSMENT & PLAN NOTE
Patient complains of pain in the right knee over the last couple months. It seems to have worsened recently. Patient did have a ankle fracture on the left a year ago and was on crutches or in a boot for almost 6 months secondary to problems with the healing. Her body mechanics were very off during then. She denies any twisting injury. She denies any swelling or redness. No instability or locking.

## 2017-11-22 ENCOUNTER — OFFICE VISIT (OUTPATIENT)
Dept: MEDICAL GROUP | Facility: MEDICAL CENTER | Age: 62
End: 2017-11-22
Payer: COMMERCIAL

## 2017-11-22 VITALS
HEIGHT: 66 IN | BODY MASS INDEX: 33.75 KG/M2 | TEMPERATURE: 97.7 F | OXYGEN SATURATION: 93 % | WEIGHT: 210 LBS | HEART RATE: 95 BPM | SYSTOLIC BLOOD PRESSURE: 118 MMHG | DIASTOLIC BLOOD PRESSURE: 68 MMHG

## 2017-11-22 DIAGNOSIS — Z11.59 NEED FOR HEPATITIS C SCREENING TEST: ICD-10-CM

## 2017-11-22 DIAGNOSIS — M25.561 RIGHT MEDIAL KNEE PAIN: ICD-10-CM

## 2017-11-22 DIAGNOSIS — E66.9 OBESITY (BMI 30-39.9): ICD-10-CM

## 2017-11-22 DIAGNOSIS — Z12.11 COLON CANCER SCREENING: ICD-10-CM

## 2017-11-22 PROCEDURE — 20610 DRAIN/INJ JOINT/BURSA W/O US: CPT | Performed by: FAMILY MEDICINE

## 2017-11-22 PROCEDURE — 99214 OFFICE O/P EST MOD 30 MIN: CPT | Mod: 25 | Performed by: FAMILY MEDICINE

## 2017-11-22 RX ORDER — ESTRADIOL 0.1 MG/G
CREAM VAGINAL DAILY
COMMUNITY
End: 2018-07-31

## 2017-11-22 NOTE — PROGRESS NOTES
Subjective:   Uzma Gamez is a 62 y.o. female here today for right knee pain    Patient is complaining of medial right knee pain that is worse when walking. She had an x-ray done which showed only minimal osteoarthritis. Patient states that pain has been progressively increasing over the last several months. She was referred to physical therapy, but did not schedule an appointment because she states that the pain is severe and she is afraid that physical therapy will make it more severe.  She states she had a meniscus tear of the left knee previously and this pain feels similar. She had meniscus surgery which did not help the pain. Her left meniscus was finally treated successfully with a cortisone injection.      Current medicines (including changes today)  Current Outpatient Prescriptions   Medication Sig Dispense Refill   • estradiol (ESTRACE VAGINAL) 0.1 MG/GM vaginal cream Insert  in vagina every day.     • aspirin (ASA) 81 MG Chew Tab chewable tablet Take 81 mg by mouth every day.     • atorvastatin (LIPITOR) 20 MG Tab Take 1 Tab by mouth every bedtime. 90 Tab 3   • alendronate (FOSAMAX) 70 MG Tab Take 1 Tab by mouth every 7 days. 12 Tab 3   • Omega-3 Fatty Acids (OMEGA 3 PO) Take 1,500 mg by mouth every day.     • conjugated estrogen (PREMARIN) 0.625 MG/GM Cream Insert 0.5 g in vagina every day.     • cetirizine (ZYRTEC) 10 MG Tab Take 10 mg by mouth every day.     • Calcium Carbonate-Vitamin D 600-400 MG-UNIT Tab Take 1 Tab by mouth 2 Times a Day.     • vitamin D (CHOLECALCIFEROL) 1000 UNIT Tab Take 2,000 Units by mouth every day.     • MethylPREDNISolone (MEDROL DOSEPAK) 4 MG Tablet Therapy Pack As directed on the packaging label. 21 Tab 0   • guaifenesin-codeine (TUSSI-ORGANIDIN NR) 100-10 MG/5ML syrup Take 5 mL by mouth 3 times a day as needed. 120 mL 0   • benzonatate (TESSALON) 100 MG Cap Take 1 Cap by mouth 3 times a day as needed. 30 Cap 0     No current facility-administered medications for this  "visit.      She  has a past medical history of Anesthesia; Bilateral cataracts; H/O: hysterectomy (6/5/2009); High cholesterol; Plantar fasciitis (6/5/2009); and Previous back surgery (6/5/2009). She also has no past medical history of Breast cancer (CMS-[facility]).    ROS   No fever, positive right ankle pain, positive right lateral hip pain       Objective:     Blood pressure 118/68, pulse 95, temperature 36.5 °C (97.7 °F), height 1.676 m (5' 6\"), weight 95.3 kg (210 lb), SpO2 93 %. Body mass index is 33.89 kg/m².   Physical Exam:  Constitutional: Alert, no distress.  Skin: Warm, dry, good turgor, no rashes in visible areas.  Eye: Equal, round and reactive, conjunctiva clear, lids normal.  Psych: Alert and oriented x3, normal affect and mood.  Right knee: Positive medial effusion, positive tenderness proximal to the tibial plateau on the medial side, full range of movement, 5 out of 5 strength.      PROCEDURE NOTE.  Discussed risk and benefit and patient agrees to kenalog injection of right knee. The joint was prepped with betadine. A 25 guage needle was inserted into the anterior medial aspect of the joint. 1 cc of 2% lidocaine without epi and 1 cc of kenalog 40 was then injected into the joint. The area was cleaned with alcohol swab and band-aid was applied. Patient tolerated procedure well with no complications.        Assessment and Plan:   The following treatment plan was discussed    1. Right medial knee pain  Cortisone/lidocaine injection done today. If no improvement consider MRI and then possibly orthopedic referral.    2. Colon cancer screening  - REFERRAL TO GASTROENTEROLOGY    3. Need for hepatitis C screening test  - HEP C VIRUS ANTIBODY; Future    4. Obesity (BMI 30-39.9)  - Patient identified as having weight management issue.  Appropriate orders and counseling given.      Followup: Return if symptoms worsen or fail to improve.         "

## 2017-12-17 ENCOUNTER — OFFICE VISIT (OUTPATIENT)
Dept: URGENT CARE | Facility: CLINIC | Age: 62
End: 2017-12-17
Payer: COMMERCIAL

## 2017-12-17 ENCOUNTER — HOSPITAL ENCOUNTER (OUTPATIENT)
Facility: MEDICAL CENTER | Age: 62
End: 2017-12-17
Attending: PHYSICIAN ASSISTANT
Payer: COMMERCIAL

## 2017-12-17 VITALS
WEIGHT: 211 LBS | SYSTOLIC BLOOD PRESSURE: 120 MMHG | BODY MASS INDEX: 33.91 KG/M2 | OXYGEN SATURATION: 96 % | HEART RATE: 92 BPM | DIASTOLIC BLOOD PRESSURE: 80 MMHG | RESPIRATION RATE: 20 BRPM | TEMPERATURE: 97.2 F | HEIGHT: 66 IN

## 2017-12-17 DIAGNOSIS — N30.01 ACUTE CYSTITIS WITH HEMATURIA: ICD-10-CM

## 2017-12-17 LAB
APPEARANCE UR: NORMAL
BILIRUB UR STRIP-MCNC: NORMAL MG/DL
COLOR UR AUTO: NORMAL
GLUCOSE UR STRIP.AUTO-MCNC: NORMAL MG/DL
KETONES UR STRIP.AUTO-MCNC: NORMAL MG/DL
LEUKOCYTE ESTERASE UR QL STRIP.AUTO: NORMAL
NITRITE UR QL STRIP.AUTO: NORMAL
PH UR STRIP.AUTO: 8 [PH] (ref 5–8)
PROT UR QL STRIP: 30 MG/DL
RBC UR QL AUTO: NORMAL
SP GR UR STRIP.AUTO: 1.01
UROBILINOGEN UR STRIP-MCNC: NORMAL MG/DL

## 2017-12-17 PROCEDURE — 81002 URINALYSIS NONAUTO W/O SCOPE: CPT | Performed by: PHYSICIAN ASSISTANT

## 2017-12-17 PROCEDURE — 99214 OFFICE O/P EST MOD 30 MIN: CPT | Performed by: PHYSICIAN ASSISTANT

## 2017-12-17 PROCEDURE — 87086 URINE CULTURE/COLONY COUNT: CPT

## 2017-12-17 RX ORDER — PHENAZOPYRIDINE HYDROCHLORIDE 200 MG/1
200 TABLET, FILM COATED ORAL 3 TIMES DAILY PRN
Qty: 6 TAB | Refills: 0 | Status: SHIPPED | OUTPATIENT
Start: 2017-12-17 | End: 2018-02-23

## 2017-12-17 RX ORDER — NITROFURANTOIN 25; 75 MG/1; MG/1
100 CAPSULE ORAL EVERY 12 HOURS
Qty: 10 CAP | Refills: 0 | Status: SHIPPED | OUTPATIENT
Start: 2017-12-17 | End: 2017-12-22

## 2017-12-17 ASSESSMENT — ENCOUNTER SYMPTOMS
HEADACHES: 0
BACK PAIN: 0
FLANK PAIN: 0
FEVER: 0
MYALGIAS: 0
CHILLS: 0
DIZZINESS: 0
SHORTNESS OF BREATH: 0
NAUSEA: 1
COUGH: 0
ABDOMINAL PAIN: 0
VOMITING: 0
DIARRHEA: 0
PALPITATIONS: 0

## 2017-12-17 NOTE — PROGRESS NOTES
Subjective:      Uzma Gamez is a 62 y.o. female who presents with Dysuria            Dysuria    This is a new problem. Episode onset: 3 days. The problem occurs every urination. The quality of the pain is described as burning. The pain is moderate. There has been no fever. She is not sexually active. There is no history of pyelonephritis. Associated symptoms include frequency, hematuria, hesitancy, nausea and urgency. Pertinent negatives include no chills, flank pain or vomiting. Treatments tried: AZO. The treatment provided mild relief. There is no history of kidney stones, recurrent UTIs or a single kidney.         Past Medical History:   Diagnosis Date   • Anesthesia     headache   • Bilateral cataracts    • H/O: hysterectomy 6/5/2009   • High cholesterol    • Plantar fasciitis 6/5/2009   • Previous back surgery 6/5/2009     Past Surgical History:   Procedure Laterality Date   • ORTHOPEDIC OSTEOTOMY Left 8/10/2016    Procedure: ORTHOPEDIC OSTEOTOMY - FOR ULNAR SHORTENING OSTEOTOMY;  Surgeon: Jerrell Linn M.D.;  Location: SURGERY [facility];  Service:    • OTHER NEUROLOGICAL SURG  2003    back surgery   • CERVICAL LAMINECTOMY POSTERIOR     • HYSTERECTOMY LAPAROSCOPY     • HYSTERECTOMY, VAGINAL     • OTHER      bilat cataracts removal       Family History   Problem Relation Age of Onset   • Diabetes Mother    • Cancer Father      LUNG   • Diabetes Brother        Allergies   Allergen Reactions   • Nkda [No Known Drug Allergy]        Medications, Allergies, and current problem list reviewed today in Epic    Review of Systems   Constitutional: Negative for chills, fever and malaise/fatigue.   Respiratory: Negative for cough and shortness of breath.    Cardiovascular: Negative for chest pain, palpitations and leg swelling.   Gastrointestinal: Positive for nausea. Negative for abdominal pain, diarrhea and vomiting.   Genitourinary: Positive for dysuria, frequency, hematuria, hesitancy and urgency.  "Negative for flank pain.   Musculoskeletal: Negative for back pain and myalgias.   Skin: Negative for rash.   Neurological: Negative for dizziness and headaches.     All other systems reviewed and are negative.        Objective:     /80   Pulse 92   Temp 36.2 °C (97.2 °F)   Resp 20   Ht 1.676 m (5' 6\")   Wt 95.7 kg (211 lb)   SpO2 96%   BMI 34.06 kg/m²      Physical Exam   Constitutional: She is oriented to person, place, and time. She appears well-developed and well-nourished. No distress.   Eyes: Conjunctivae are normal.   Cardiovascular: Normal rate, regular rhythm and normal heart sounds.  Exam reveals no gallop and no friction rub.    No murmur heard.  Pulmonary/Chest: Effort normal and breath sounds normal. No respiratory distress. She has no wheezes. She has no rales.   Abdominal: Soft. Bowel sounds are normal. She exhibits no distension and no mass. There is tenderness (mild suprapubic tenderness). There is no rigidity, no rebound, no guarding and no CVA tenderness.   Neurological: She is alert and oriented to person, place, and time. No cranial nerve deficit.   Skin: Skin is warm and dry.   Psychiatric: She has a normal mood and affect. Her behavior is normal. Judgment and thought content normal.          UA: positive for leuks, nitrates,and blood     Assessment/Plan:     1. Acute cystitis with hematuria  POCT Urinalysis    Urine Culture    nitrofurantoin monohydr macro (MACROBID) 100 MG Cap    phenazopyridine (PYRIDIUM) 200 MG Tab       Current Outpatient Prescriptions:   •  nitrofurantoin monohydr macro (MACROBID) 100 MG Cap, Take 1 Cap by mouth every 12 hours for 5 days., Disp: 10 Cap, Rfl: 0  •  phenazopyridine (PYRIDIUM) 200 MG Tab, Take 1 Tab by mouth 3 times a day as needed., Disp: 6 Tab, Rfl: 0    Will culture urine and change tx plan accordingly.     Differential diagnoses, Supportive care, and indications for immediate follow-up discussed with patient.   Instructed to return to clinic " or nearest emergency department for any change in condition, further concerns, or worsening of symptoms.      The patient demonstrated a good understanding and agreed with the treatment plan.    Pia Cline P.A.-C.

## 2017-12-17 NOTE — PATIENT INSTRUCTIONS
Urinary Tract Infection  A urinary tract infection (UTI) can occur any place along the urinary tract. The tract includes the kidneys, ureters, bladder, and urethra. A type of germ called bacteria often causes a UTI. UTIs are often helped with antibiotic medicine.   HOME CARE   · If given, take antibiotics as told by your doctor. Finish them even if you start to feel better.  · Drink enough fluids to keep your pee (urine) clear or pale yellow.  · Avoid tea, drinks with caffeine, and bubbly (carbonated) drinks.  · Pee often. Avoid holding your pee in for a long time.  · Pee before and after having sex (intercourse).  · Wipe from front to back after you poop (bowel movement) if you are a woman. Use each tissue only once.  GET HELP RIGHT AWAY IF:   · You have back pain.  · You have lower belly (abdominal) pain.  · You have chills.  · You feel sick to your stomach (nauseous).  · You throw up (vomit).  · Your burning or discomfort with peeing does not go away.  · You have a fever.  · Your symptoms are not better in 3 days.  MAKE SURE YOU:   · Understand these instructions.  · Will watch your condition.  · Will get help right away if you are not doing well or get worse.     This information is not intended to replace advice given to you by your health care provider. Make sure you discuss any questions you have with your health care provider.     Document Released: 06/05/2009 Document Revised: 01/08/2016 Document Reviewed: 07/18/2013  [employer] Interactive Patient Education ©2016 [employer]

## 2017-12-19 LAB
BACTERIA UR CULT: NORMAL
SIGNIFICANT IND 70042: NORMAL
SITE SITE: NORMAL
SOURCE SOURCE: NORMAL

## 2018-01-09 ENCOUNTER — OFFICE VISIT (OUTPATIENT)
Dept: MEDICAL GROUP | Facility: MEDICAL CENTER | Age: 63
End: 2018-01-09
Payer: COMMERCIAL

## 2018-01-09 VITALS
HEART RATE: 89 BPM | OXYGEN SATURATION: 97 % | WEIGHT: 210.6 LBS | DIASTOLIC BLOOD PRESSURE: 54 MMHG | HEIGHT: 66 IN | SYSTOLIC BLOOD PRESSURE: 112 MMHG | BODY MASS INDEX: 33.85 KG/M2 | TEMPERATURE: 97.6 F

## 2018-01-09 DIAGNOSIS — R25.2 MUSCLE CRAMP, NOCTURNAL: ICD-10-CM

## 2018-01-09 DIAGNOSIS — J01.00 ACUTE NON-RECURRENT MAXILLARY SINUSITIS: ICD-10-CM

## 2018-01-09 DIAGNOSIS — H92.01 RIGHT EAR PAIN: ICD-10-CM

## 2018-01-09 PROCEDURE — 99214 OFFICE O/P EST MOD 30 MIN: CPT | Performed by: NURSE PRACTITIONER

## 2018-01-09 RX ORDER — FLUTICASONE PROPIONATE 50 MCG
1 SPRAY, SUSPENSION (ML) NASAL DAILY
COMMUNITY
End: 2018-07-31

## 2018-01-09 RX ORDER — AMOXICILLIN AND CLAVULANATE POTASSIUM 875; 125 MG/1; MG/1
1 TABLET, FILM COATED ORAL 2 TIMES DAILY
Qty: 20 TAB | Refills: 0 | Status: SHIPPED | OUTPATIENT
Start: 2018-01-09 | End: 2018-02-23

## 2018-01-09 NOTE — PROGRESS NOTES
"Subjective:     Chief Complaint   Patient presents with   • Otalgia     right side   • Muscle cramp     left calf cramping     Uzma Gamez is a 62 y.o. Female Established patient of Dr. schaffer here for evaluation of right ear pain, sinus congestion and pressure. She reports having had a typical \"cold\" about 2 weeks ago with sore throat, cough, congestion. Sore throat and cough has resolved but she continues to have significant nasal congestion and sinus pressure. Now she's had increasing pain in the right ear, pain on the right side with chewing, 6/10 at times and sharp. She is not using any over-the-counter medications. She denies fever, nausea, fatigue, shortness of breath. She has had some dull headaches. She states that she sees the dentist every 6 months and has not had any major dental issues.  She's also having difficulty with cramping in the left calf at night, waking her up. She's not tried any treatment. Episodes tend to last a few minutes and then resolve spontaneously. She's not had any leg swelling, difficulty walking, discoloration.     Current medicines (including changes today)  Current Outpatient Prescriptions   Medication Sig Dispense Refill   • fluticasone (FLONASE) 50 MCG/ACT nasal spray Spray 1 Spray in nose every day.     • amoxicillin-clavulanate (AUGMENTIN) 875-125 MG Tab Take 1 Tab by mouth 2 times a day. 20 Tab 0   • estradiol (ESTRACE VAGINAL) 0.1 MG/GM vaginal cream Insert  in vagina every day.     • aspirin (ASA) 81 MG Chew Tab chewable tablet Take 81 mg by mouth every day.     • atorvastatin (LIPITOR) 20 MG Tab Take 1 Tab by mouth every bedtime. 90 Tab 3   • Omega-3 Fatty Acids (OMEGA 3 PO) Take 1,500 mg by mouth every day.     • Calcium Carbonate-Vitamin D 600-400 MG-UNIT Tab Take 1 Tab by mouth 2 Times a Day.     • vitamin D (CHOLECALCIFEROL) 1000 UNIT Tab Take 2,000 Units by mouth every day.     • phenazopyridine (PYRIDIUM) 200 MG Tab Take 1 Tab by mouth 3 times a day as needed. 6 " "Tab 0   • MethylPREDNISolone (MEDROL DOSEPAK) 4 MG Tablet Therapy Pack As directed on the packaging label. 21 Tab 0   • guaifenesin-codeine (TUSSI-ORGANIDIN NR) 100-10 MG/5ML syrup Take 5 mL by mouth 3 times a day as needed. 120 mL 0   • benzonatate (TESSALON) 100 MG Cap Take 1 Cap by mouth 3 times a day as needed. 30 Cap 0   • alendronate (FOSAMAX) 70 MG Tab Take 1 Tab by mouth every 7 days. 12 Tab 3   • conjugated estrogen (PREMARIN) 0.625 MG/GM Cream Insert 0.5 g in vagina every day.     • cetirizine (ZYRTEC) 10 MG Tab Take 10 mg by mouth every day.       No current facility-administered medications for this visit.      She  has a past medical history of Anesthesia; Bilateral cataracts; H/O: hysterectomy (6/5/2009); High cholesterol; Plantar fasciitis (6/5/2009); and Previous back surgery (6/5/2009). She also has no past medical history of Breast cancer (CMS-[facility]).    ROS included above     Objective:     Blood pressure 112/54, pulse 89, temperature 36.4 °C (97.6 °F), height 1.676 m (5' 6\"), weight 95.5 kg (210 lb 9.6 oz), SpO2 97 %, not currently breastfeeding. Body mass index is 33.99 kg/m².     Physical Exam:  General: Alert, oriented in no acute distress.  Eye contact is good, speech is normal, affect calm  HEENT: Posterior oropharynx pink, moist, no lesions or exudate. Nares with yellow mucus. Left TM gray with good landmarks. Right TM with moderate clear effusion. Mild tenderness over the right maxillary sinus. No lymphadenopathy.  Lungs: clear to auscultation bilaterally, normal effort, no wheeze/ rhonchi/ rales.  CV: regular rate and rhythm, S1, S2, no murmur  MS: No tenderness over the left lower leg, no swelling or erythema  Ext: no edema, color normal, vascularity normal, temperature normal    Assessment and Plan:   The following treatment plan was discussed   1. Acute non-recurrent maxillary sinusitis  Acute congestion, sinus pressure, pain radiating to the right ear and tenderness over the right " maxillary sinus. Start Augmentin, advised to complete entire course of antibiotic. Add probiotic. Increase fluids, over-the-counter Mucinex if needed, she has Flonase at home and will begin using this as well. Follow-up if not gradually improving  fluticasone (FLONASE) 50 MCG/ACT nasal spray    amoxicillin-clavulanate (AUGMENTIN) 875-125 MG Tab   2. Right ear pain  As discussed above    3. Muscle cramp, nocturnal  Encouraged increase fluids, increase dietary sources of magnesium and potassium. Options discussed in detail. Stretching encouraged        Followup: One week if no improvement, sooner as needed         Please note that this dictation was created using voice recognition software. I have worked with consultants from the vendor as well as technical experts from [employer] to optimize the interface. I have made every reasonable attempt to correct obvious errors, but I expect that there are errors of grammar and possibly content that I did not discover before finalizing the note.

## 2018-01-16 ENCOUNTER — TELEPHONE (OUTPATIENT)
Dept: MEDICAL GROUP | Facility: MEDICAL CENTER | Age: 63
End: 2018-01-16

## 2018-01-16 DIAGNOSIS — M25.561 CHRONIC PAIN OF RIGHT KNEE: ICD-10-CM

## 2018-01-16 DIAGNOSIS — G89.29 CHRONIC PAIN OF RIGHT KNEE: ICD-10-CM

## 2018-01-18 ENCOUNTER — HOSPITAL ENCOUNTER (OUTPATIENT)
Dept: RADIOLOGY | Facility: MEDICAL CENTER | Age: 63
End: 2018-01-18
Attending: FAMILY MEDICINE
Payer: COMMERCIAL

## 2018-01-18 DIAGNOSIS — G89.29 CHRONIC PAIN OF RIGHT KNEE: ICD-10-CM

## 2018-01-18 DIAGNOSIS — M25.561 CHRONIC PAIN OF RIGHT KNEE: ICD-10-CM

## 2018-01-18 PROCEDURE — 73721 MRI JNT OF LWR EXTRE W/O DYE: CPT | Mod: RT

## 2018-01-30 ENCOUNTER — TELEPHONE (OUTPATIENT)
Dept: MEDICAL GROUP | Facility: MEDICAL CENTER | Age: 63
End: 2018-01-30

## 2018-01-30 DIAGNOSIS — S83.241A ACUTE MEDIAL MENISCUS TEAR OF RIGHT KNEE, INITIAL ENCOUNTER: ICD-10-CM

## 2018-01-30 NOTE — TELEPHONE ENCOUNTER
Patient called because she never got her results for her MRI     Please notify patient that she has a meniscus tear. If she is still continuing to have pain after physical therapy, we should refer her to an orthopedic surgeon for possible arthroscopic surgery.  Nabil Cifuentes M.D.     I have notified patient of results patient states she is in a lot of pain and would like to know if you can place an urgent referral to [facility] to Dr. Flo Cole

## 2018-02-12 LAB
BASOPHILS # BLD AUTO: 0.07 X10^3/UL (ref 0–0.1)
BASOPHILS NFR BLD AUTO: 1 % (ref 0–1)
CULTURE INDICATED?: NO
EOSINOPHIL # BLD AUTO: 0.33 X10^3/UL (ref 0–0.4)
EOSINOPHIL NFR BLD AUTO: 4 % (ref 1–7)
ERYTHROCYTE [DISTWIDTH] IN BLOOD BY AUTOMATED COUNT: 12.1 % (ref 9.6–15.2)
LYMPHOCYTES # BLD AUTO: 2.66 X10^3/UL (ref 1–3.4)
LYMPHOCYTES NFR BLD AUTO: 32 % (ref 22–44)
MCH RBC QN AUTO: 31.2 PG (ref 27–34.8)
MCHC RBC AUTO-ENTMCNC: 33.6 G/DL (ref 32.4–35.8)
MCV RBC AUTO: 92.7 FL (ref 80–100)
MD: NO
MICROSCOPIC: (no result)
MONOCYTES # BLD AUTO: 0.95 X10^3/UL (ref 0.2–0.8)
MONOCYTES NFR BLD AUTO: 12 % (ref 2–9)
NEUTROPHILS # BLD AUTO: 4.25 X10^3/UL (ref 1.8–6.8)
NEUTROPHILS NFR BLD AUTO: 51 % (ref 42–75)
PLATELET # BLD AUTO: 365 X10^3/UL (ref 130–400)
PMV BLD AUTO: 8.1 FL (ref 7.4–10.4)
RBC # BLD AUTO: 4.95 X10^6/UL (ref 3.82–5.3)

## 2018-02-15 ENCOUNTER — HOSPITAL ENCOUNTER (OUTPATIENT)
Dept: HOSPITAL 8 - OUT | Age: 63
End: 2018-02-15
Attending: ORTHOPAEDIC SURGERY
Payer: COMMERCIAL

## 2018-02-15 VITALS — DIASTOLIC BLOOD PRESSURE: 80 MMHG | SYSTOLIC BLOOD PRESSURE: 123 MMHG

## 2018-02-15 VITALS — HEIGHT: 66 IN | WEIGHT: 208.78 LBS | BODY MASS INDEX: 33.55 KG/M2
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
## 2018-02-15 DIAGNOSIS — S83.281A: ICD-10-CM

## 2018-02-15 DIAGNOSIS — E78.5: ICD-10-CM

## 2018-02-15 DIAGNOSIS — M94.261: ICD-10-CM
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
## 2018-02-15 DIAGNOSIS — S83.231A: Primary | ICD-10-CM

## 2018-02-15 PROCEDURE — 29880 ARTHRS KNE SRG MNISECTMY M&L: CPT

## 2018-02-15 PROCEDURE — 93005 ELECTROCARDIOGRAM TRACING: CPT

## 2018-02-15 PROCEDURE — 81003 URINALYSIS AUTO W/O SCOPE: CPT

## 2018-02-15 PROCEDURE — 36415 COLL VENOUS BLD VENIPUNCTURE: CPT

## 2018-02-15 PROCEDURE — 85025 COMPLETE CBC W/AUTO DIFF WBC: CPT

## 2018-02-15 RX ADMIN — FENTANYL CITRATE PRN MCG: 50 INJECTION INTRAMUSCULAR; INTRAVENOUS at 12:30

## 2018-02-15 RX ADMIN — EPINEPHRINE ONE MG: 1 INJECTION, SOLUTION INTRAMUSCULAR; SUBCUTANEOUS at 11:41

## 2018-02-15 RX ADMIN — FENTANYL CITRATE PRN MCG: 50 INJECTION INTRAMUSCULAR; INTRAVENOUS at 12:20

## 2018-02-15 RX ADMIN — EPINEPHRINE ONE MG: 1 INJECTION, SOLUTION INTRAMUSCULAR; SUBCUTANEOUS at 11:42

## 2018-02-15 RX ADMIN — FENTANYL CITRATE PRN MCG: 50 INJECTION INTRAMUSCULAR; INTRAVENOUS at 12:25

## 2018-02-20 ENCOUNTER — HOSPITAL ENCOUNTER (OUTPATIENT)
Dept: LAB | Facility: MEDICAL CENTER | Age: 63
End: 2018-02-20
Attending: FAMILY MEDICINE
Payer: COMMERCIAL

## 2018-02-20 DIAGNOSIS — R73.01 ELEVATED FASTING BLOOD SUGAR: ICD-10-CM

## 2018-02-20 DIAGNOSIS — E78.00 PURE HYPERCHOLESTEROLEMIA: Chronic | ICD-10-CM

## 2018-02-20 DIAGNOSIS — Z11.59 NEED FOR HEPATITIS C SCREENING TEST: ICD-10-CM

## 2018-02-20 DIAGNOSIS — I25.10 CORONARY ARTERY DISEASE INVOLVING NATIVE CORONARY ARTERY OF NATIVE HEART WITHOUT ANGINA PECTORIS: Chronic | ICD-10-CM

## 2018-02-20 DIAGNOSIS — E55.9 VITAMIN D INSUFFICIENCY: Chronic | ICD-10-CM

## 2018-02-20 LAB
25(OH)D3 SERPL-MCNC: 34 NG/ML (ref 30–100)
ALBUMIN SERPL BCP-MCNC: 3.9 G/DL (ref 3.2–4.9)
ALBUMIN/GLOB SERPL: 1.1 G/DL
ALP SERPL-CCNC: 60 U/L (ref 30–99)
ALT SERPL-CCNC: 13 U/L (ref 2–50)
ANION GAP SERPL CALC-SCNC: 5 MMOL/L (ref 0–11.9)
AST SERPL-CCNC: 14 U/L (ref 12–45)
BASOPHILS # BLD AUTO: 1 % (ref 0–1.8)
BASOPHILS # BLD: 0.06 K/UL (ref 0–0.12)
BILIRUB SERPL-MCNC: 0.7 MG/DL (ref 0.1–1.5)
BUN SERPL-MCNC: 23 MG/DL (ref 8–22)
CALCIUM SERPL-MCNC: 9.5 MG/DL (ref 8.5–10.5)
CHLORIDE SERPL-SCNC: 106 MMOL/L (ref 96–112)
CHOLEST SERPL-MCNC: 150 MG/DL (ref 100–199)
CO2 SERPL-SCNC: 30 MMOL/L (ref 20–33)
CREAT SERPL-MCNC: 0.87 MG/DL (ref 0.5–1.4)
EOSINOPHIL # BLD AUTO: 0.32 K/UL (ref 0–0.51)
EOSINOPHIL NFR BLD: 5.1 % (ref 0–6.9)
ERYTHROCYTE [DISTWIDTH] IN BLOOD BY AUTOMATED COUNT: 39.4 FL (ref 35.9–50)
EST. AVERAGE GLUCOSE BLD GHB EST-MCNC: 114 MG/DL
GLOBULIN SER CALC-MCNC: 3.5 G/DL (ref 1.9–3.5)
GLUCOSE SERPL-MCNC: 102 MG/DL (ref 65–99)
HBA1C MFR BLD: 5.6 % (ref 0–5.6)
HCT VFR BLD AUTO: 45.4 % (ref 37–47)
HCV AB SER QL: NEGATIVE
HDLC SERPL-MCNC: 69 MG/DL
HGB BLD-MCNC: 14.9 G/DL (ref 12–16)
IMM GRANULOCYTES # BLD AUTO: 0.02 K/UL (ref 0–0.11)
IMM GRANULOCYTES NFR BLD AUTO: 0.3 % (ref 0–0.9)
LDLC SERPL CALC-MCNC: 54 MG/DL
LYMPHOCYTES # BLD AUTO: 1.91 K/UL (ref 1–4.8)
LYMPHOCYTES NFR BLD: 30.5 % (ref 22–41)
MCH RBC QN AUTO: 31 PG (ref 27–33)
MCHC RBC AUTO-ENTMCNC: 32.8 G/DL (ref 33.6–35)
MCV RBC AUTO: 94.4 FL (ref 81.4–97.8)
MONOCYTES # BLD AUTO: 0.57 K/UL (ref 0–0.85)
MONOCYTES NFR BLD AUTO: 9.1 % (ref 0–13.4)
NEUTROPHILS # BLD AUTO: 3.38 K/UL (ref 2–7.15)
NEUTROPHILS NFR BLD: 54 % (ref 44–72)
NRBC # BLD AUTO: 0 K/UL
NRBC BLD-RTO: 0 /100 WBC
PLATELET # BLD AUTO: 343 K/UL (ref 164–446)
PMV BLD AUTO: 9.7 FL (ref 9–12.9)
POTASSIUM SERPL-SCNC: 4.3 MMOL/L (ref 3.6–5.5)
PROT SERPL-MCNC: 7.4 G/DL (ref 6–8.2)
RBC # BLD AUTO: 4.81 M/UL (ref 4.2–5.4)
SODIUM SERPL-SCNC: 141 MMOL/L (ref 135–145)
TRIGL SERPL-MCNC: 135 MG/DL (ref 0–149)
TSH SERPL DL<=0.005 MIU/L-ACNC: 1.58 UIU/ML (ref 0.38–5.33)
WBC # BLD AUTO: 6.3 K/UL (ref 4.8–10.8)

## 2018-02-20 PROCEDURE — 86803 HEPATITIS C AB TEST: CPT

## 2018-02-20 PROCEDURE — 85025 COMPLETE CBC W/AUTO DIFF WBC: CPT

## 2018-02-20 PROCEDURE — 36415 COLL VENOUS BLD VENIPUNCTURE: CPT

## 2018-02-20 PROCEDURE — 84443 ASSAY THYROID STIM HORMONE: CPT

## 2018-02-20 PROCEDURE — 82306 VITAMIN D 25 HYDROXY: CPT

## 2018-02-20 PROCEDURE — 80061 LIPID PANEL: CPT

## 2018-02-20 PROCEDURE — 80053 COMPREHEN METABOLIC PANEL: CPT

## 2018-02-20 PROCEDURE — 83036 HEMOGLOBIN GLYCOSYLATED A1C: CPT

## 2018-02-23 ENCOUNTER — OFFICE VISIT (OUTPATIENT)
Dept: MEDICAL GROUP | Facility: MEDICAL CENTER | Age: 63
End: 2018-02-23
Payer: COMMERCIAL

## 2018-02-23 VITALS
HEART RATE: 83 BPM | BODY MASS INDEX: 33.91 KG/M2 | HEIGHT: 66 IN | RESPIRATION RATE: 12 BRPM | WEIGHT: 211 LBS | SYSTOLIC BLOOD PRESSURE: 106 MMHG | DIASTOLIC BLOOD PRESSURE: 80 MMHG | OXYGEN SATURATION: 96 % | TEMPERATURE: 97.2 F

## 2018-02-23 DIAGNOSIS — M85.89 OSTEOPENIA OF MULTIPLE SITES: ICD-10-CM

## 2018-02-23 DIAGNOSIS — E66.9 OBESITY (BMI 30-39.9): ICD-10-CM

## 2018-02-23 DIAGNOSIS — Z98.890 S/P ORIF (OPEN REDUCTION INTERNAL FIXATION) FRACTURE: ICD-10-CM

## 2018-02-23 DIAGNOSIS — I25.10 CORONARY ARTERY DISEASE INVOLVING NATIVE CORONARY ARTERY OF NATIVE HEART WITHOUT ANGINA PECTORIS: Chronic | ICD-10-CM

## 2018-02-23 DIAGNOSIS — L81.9 HYPERPIGMENTATION OF SKIN: ICD-10-CM

## 2018-02-23 DIAGNOSIS — Z98.890 PREVIOUS BACK SURGERY: ICD-10-CM

## 2018-02-23 DIAGNOSIS — Z87.81 S/P ORIF (OPEN REDUCTION INTERNAL FIXATION) FRACTURE: ICD-10-CM

## 2018-02-23 DIAGNOSIS — G50.0 TRIGEMINAL NEURALGIA PAIN: ICD-10-CM

## 2018-02-23 DIAGNOSIS — E66.9 OBESITY (BMI 30.0-34.9): ICD-10-CM

## 2018-02-23 DIAGNOSIS — E78.00 PURE HYPERCHOLESTEROLEMIA: Chronic | ICD-10-CM

## 2018-02-23 DIAGNOSIS — M54.50 ACUTE LEFT-SIDED LOW BACK PAIN WITHOUT SCIATICA: ICD-10-CM

## 2018-02-23 DIAGNOSIS — Z90.710 H/O: HYSTERECTOMY: ICD-10-CM

## 2018-02-23 DIAGNOSIS — R73.01 ELEVATED FASTING BLOOD SUGAR: ICD-10-CM

## 2018-02-23 DIAGNOSIS — E55.9 VITAMIN D INSUFFICIENCY: Chronic | ICD-10-CM

## 2018-02-23 DIAGNOSIS — Z00.00 ANNUAL PHYSICAL EXAM: ICD-10-CM

## 2018-02-23 PROBLEM — S83.241A ACUTE MEDIAL MENISCUS TEAR OF RIGHT KNEE: Status: RESOLVED | Noted: 2018-01-30 | Resolved: 2018-02-23

## 2018-02-23 PROBLEM — M25.551 RIGHT HIP PAIN: Status: RESOLVED | Noted: 2017-10-24 | Resolved: 2018-02-23

## 2018-02-23 PROCEDURE — 99396 PREV VISIT EST AGE 40-64: CPT | Performed by: FAMILY MEDICINE

## 2018-02-23 RX ORDER — TIZANIDINE 4 MG/1
4 TABLET ORAL
Qty: 30 TAB | Refills: 1 | Status: SHIPPED | OUTPATIENT
Start: 2018-02-23 | End: 2018-07-31

## 2018-02-23 RX ORDER — GABAPENTIN 300 MG/1
300 CAPSULE ORAL 2 TIMES DAILY PRN
Qty: 60 CAP | Refills: 2 | Status: SHIPPED | OUTPATIENT
Start: 2018-02-23 | End: 2018-07-31

## 2018-02-23 RX ORDER — ATORVASTATIN CALCIUM 20 MG/1
20 TABLET, FILM COATED ORAL
Qty: 90 TAB | Refills: 3 | Status: SHIPPED | OUTPATIENT
Start: 2018-02-23 | End: 2018-04-05

## 2018-02-23 RX ORDER — ALENDRONATE SODIUM 70 MG/1
70 TABLET ORAL
Qty: 12 TAB | Refills: 3 | Status: SHIPPED | OUTPATIENT
Start: 2018-02-23 | End: 2018-04-22

## 2018-02-23 ASSESSMENT — PATIENT HEALTH QUESTIONNAIRE - PHQ9: CLINICAL INTERPRETATION OF PHQ2 SCORE: 0

## 2018-02-23 NOTE — PROGRESS NOTES
Subjective:   Uzma Gamez is a 62 y.o. female here today for annual    Patient just had an eye exam a few weeks ago. She sees the dentist twice a year.  Patient has been taking calcium, vitamin D, Fosamax for the last one year because of 2 fragility fractures in 2016.  Reviewed lab work with patient which shows a mildly elevated fasting blood sugar 102 with an A1c of 5.6.    She is complaining of back pain, which occurred after twisting in the shower. This occurs about every couple years. Usually resolves over time. She complains of pain when rolling over in bed.    She is also complaining of a nerve pain that occurs behind her bilateral ears. The pain is sharp in nature. Patient states that the pain can last for one week at a time.    Patient had meniscus surgery last week.      Current medicines (including changes today)  Current Outpatient Prescriptions   Medication Sig Dispense Refill   • atorvastatin (LIPITOR) 20 MG Tab Take 1 Tab by mouth every bedtime. 90 Tab 3   • alendronate (FOSAMAX) 70 MG Tab Take 1 Tab by mouth every 7 days. 12 Tab 3   • tizanidine (ZANAFLEX) 4 MG Tab Take 1 Tab by mouth at bedtime as needed (back pain). 30 Tab 1   • Diclofenac Sodium (VOLTAREN) 1 % Gel Apply 2 g to affected area(s) 2 times a day as needed (back pain). 100 g 1   • gabapentin (NEURONTIN) 300 MG Cap Take 1 Cap by mouth 2 times a day as needed (nerve pain). 60 Cap 2   • fluticasone (FLONASE) 50 MCG/ACT nasal spray Spray 1 Spray in nose every day.     • estradiol (ESTRACE VAGINAL) 0.1 MG/GM vaginal cream Insert  in vagina every day.     • aspirin (ASA) 81 MG Chew Tab chewable tablet Take 81 mg by mouth every day.     • Omega-3 Fatty Acids (OMEGA 3 PO) Take 1,500 mg by mouth every day.     • cetirizine (ZYRTEC) 10 MG Tab Take 10 mg by mouth every day.     • Calcium Carbonate-Vitamin D 600-400 MG-UNIT Tab Take 1 Tab by mouth 2 Times a Day.     • vitamin D (CHOLECALCIFEROL) 1000 UNIT Tab Take 2,000 Units by mouth every day.  "      No current facility-administered medications for this visit.      She  has a past medical history of Anesthesia; Bilateral cataracts; H/O: hysterectomy (6/5/2009); High cholesterol; Plantar fasciitis (6/5/2009); and Previous back surgery (6/5/2009). She also has no past medical history of Breast cancer (CMS-[facility]).    ROS   No chest pain, no shortness of breath, no abdominal pain       Objective:     Blood pressure 106/80, pulse 83, temperature 36.2 °C (97.2 °F), resp. rate 12, height 1.676 m (5' 6\"), weight 95.7 kg (211 lb), SpO2 96 %. Body mass index is 34.06 kg/m².   Physical Exam:  Constitutional: Alert, no distress.  Skin: Warm, dry, good turgor, no rashes in visible areas.  Eye: Equal, round and reactive, conjunctiva clear, lids normal.  ENMT: Lips without lesions, good dentition, oropharynx clear.  Neck: Trachea midline, no masses, no thyromegaly. No cervical or supraclavicular lymphadenopathy  Respiratory: Unlabored respiratory effort, lungs clear to auscultation, no wheezes, no ronchi.  Cardiovascular: Normal S1, S2, no murmur, no edema.  Psych: Alert and oriented x3, normal affect and mood.        Assessment and Plan:   The following treatment plan was discussed    1. Annual physical exam  Overall doing fair for her age.    2. Pure hypercholesterolemia  Controlled. Continue atorvastatin. Follow-up with labs annually.  - atorvastatin (LIPITOR) 20 MG Tab; Take 1 Tab by mouth every bedtime.  Dispense: 90 Tab; Refill: 3  - COMP METABOLIC PANEL; Future  - LIPID PROFILE; Future  - TSH WITH REFLEX TO FT4; Future    3. Osteopenia of multiple sites  We will continue alendronate for at least 2 more years. Continue calcium and vitamin D.  - alendronate (FOSAMAX) 70 MG Tab; Take 1 Tab by mouth every 7 days.  Dispense: 12 Tab; Refill: 3    4. Coronary artery disease involving native coronary artery of native heart without angina pectoris  Asymptomatic. Continue current medication. Follow-up annually.  - CBC WITH " DIFFERENTIAL; Future    5. Obesity (BMI 30-39.9)  Advised diet and exercise.    6. Elevated fasting blood sugar  Advised diet and exercise. Follow-up annually with A1c.  - HEMOGLOBIN A1C; Future    7. Previous back surgery  Stable. Continue to monitor.    8. Acute left-sided low back pain without sciatica  Trial of Zanaflex.    9. S/P ORIF (open reduction internal fixation) fracture  Stable. Continue to monitor.    10. Vitamin D insufficiency  Advised vitamin D supplementation.    11. Obesity (BMI 30.0-34.9)  Advised diet and exercise.    12. Hyperpigmentation of skin  Stable. Continue to monitor.    13. H/O: hysterectomy      14. Trigeminal neuralgia pain  Trial of gabapentin when needed.      Followup: Return in about 1 year (around 2/23/2019) for Annual.

## 2018-04-22 DIAGNOSIS — M85.80 OSTEOPENIA: ICD-10-CM

## 2018-04-22 RX ORDER — ALENDRONATE SODIUM 70 MG/1
70 TABLET ORAL
Qty: 12 TAB | Refills: 3 | Status: SHIPPED | OUTPATIENT
Start: 2018-04-22 | End: 2019-03-01

## 2018-07-31 DIAGNOSIS — Z01.810 PRE-OPERATIVE CARDIOVASCULAR EXAMINATION: ICD-10-CM

## 2018-07-31 DIAGNOSIS — Z01.812 PRE-OPERATIVE LABORATORY EXAMINATION: ICD-10-CM

## 2018-07-31 LAB
ANION GAP SERPL CALC-SCNC: 13 MMOL/L (ref 0–11.9)
BUN SERPL-MCNC: 21 MG/DL (ref 8–22)
CALCIUM SERPL-MCNC: 9.8 MG/DL (ref 8.5–10.5)
CHLORIDE SERPL-SCNC: 104 MMOL/L (ref 96–112)
CO2 SERPL-SCNC: 23 MMOL/L (ref 20–33)
CREAT SERPL-MCNC: 0.83 MG/DL (ref 0.5–1.4)
EKG IMPRESSION: NORMAL
ERYTHROCYTE [DISTWIDTH] IN BLOOD BY AUTOMATED COUNT: 38.4 FL (ref 35.9–50)
GLUCOSE SERPL-MCNC: 87 MG/DL (ref 65–99)
HCT VFR BLD AUTO: 43.7 % (ref 37–47)
HGB BLD-MCNC: 15 G/DL (ref 12–16)
MCH RBC QN AUTO: 31.6 PG (ref 27–33)
MCHC RBC AUTO-ENTMCNC: 34.3 G/DL (ref 33.6–35)
MCV RBC AUTO: 92 FL (ref 81.4–97.8)
PLATELET # BLD AUTO: 305 K/UL (ref 164–446)
PMV BLD AUTO: 10.1 FL (ref 9–12.9)
POTASSIUM SERPL-SCNC: 3.5 MMOL/L (ref 3.6–5.5)
RBC # BLD AUTO: 4.75 M/UL (ref 4.2–5.4)
SODIUM SERPL-SCNC: 140 MMOL/L (ref 135–145)
WBC # BLD AUTO: 8.1 K/UL (ref 4.8–10.8)

## 2018-07-31 PROCEDURE — 93010 ELECTROCARDIOGRAM REPORT: CPT | Performed by: INTERNAL MEDICINE

## 2018-07-31 PROCEDURE — 93005 ELECTROCARDIOGRAM TRACING: CPT

## 2018-07-31 PROCEDURE — 36415 COLL VENOUS BLD VENIPUNCTURE: CPT

## 2018-07-31 PROCEDURE — 85027 COMPLETE CBC AUTOMATED: CPT

## 2018-07-31 PROCEDURE — 80048 BASIC METABOLIC PNL TOTAL CA: CPT

## 2018-07-31 RX ORDER — BIOTIN 1 MG
500 TABLET ORAL DAILY
COMMUNITY
End: 2019-09-05

## 2018-07-31 NOTE — DISCHARGE PLANNING
DISCHARGE PLANNING NOTE - TOTAL JOINT     Procedure: Procedure(s):  ANKLE TOTAL ARTHROPLASTY- GAFFNEY  Procedure Date: 8/27/2018  Insurance:  Payor: [employer] / Plan: [employer] FEP   Equipment currently available at home? cane, crutches and kneeling scooter  Who will be with you during your recovery? spouse     Plan: Patient has had a broken ankle in the past with NWB restrictions ans stated to the pre-admission RN that she has all the equipment needed and no discharge concerns. She declined to meet with this CM. Anticipate discharge home.

## 2018-08-27 ENCOUNTER — HOSPITAL ENCOUNTER (INPATIENT)
Facility: MEDICAL CENTER | Age: 63
LOS: 1 days | DRG: 469 | End: 2018-08-27
Attending: ORTHOPAEDIC SURGERY | Admitting: ORTHOPAEDIC SURGERY
Payer: COMMERCIAL

## 2018-08-27 ENCOUNTER — APPOINTMENT (OUTPATIENT)
Dept: RADIOLOGY | Facility: MEDICAL CENTER | Age: 63
DRG: 469 | End: 2018-08-27
Attending: ORTHOPAEDIC SURGERY
Payer: COMMERCIAL

## 2018-08-27 VITALS
DIASTOLIC BLOOD PRESSURE: 61 MMHG | HEIGHT: 66 IN | HEART RATE: 89 BPM | TEMPERATURE: 98.1 F | WEIGHT: 199.08 LBS | OXYGEN SATURATION: 94 % | SYSTOLIC BLOOD PRESSURE: 114 MMHG | RESPIRATION RATE: 14 BRPM | BODY MASS INDEX: 31.99 KG/M2

## 2018-08-27 PROBLEM — M19.172 POST-TRAUMATIC ARTHRITIS OF LEFT ANKLE: Status: ACTIVE | Noted: 2018-08-27

## 2018-08-27 PROCEDURE — 160046 HCHG PACU - 1ST 60 MINS PHASE II: Performed by: ORTHOPAEDIC SURGERY

## 2018-08-27 PROCEDURE — 700102 HCHG RX REV CODE 250 W/ 637 OVERRIDE(OP)

## 2018-08-27 PROCEDURE — 160041 HCHG SURGERY MINUTES - EA ADDL 1 MIN LEVEL 4: Performed by: ORTHOPAEDIC SURGERY

## 2018-08-27 PROCEDURE — 160029 HCHG SURGERY MINUTES - 1ST 30 MINS LEVEL 4: Performed by: ORTHOPAEDIC SURGERY

## 2018-08-27 PROCEDURE — 700101 HCHG RX REV CODE 250

## 2018-08-27 PROCEDURE — 0SRG0JZ REPLACEMENT OF LEFT ANKLE JOINT WITH SYNTHETIC SUBSTITUTE, OPEN APPROACH: ICD-10-PCS | Performed by: ORTHOPAEDIC SURGERY

## 2018-08-27 PROCEDURE — 500367 HCHG DRAIN KIT, HEMOVAC: Performed by: ORTHOPAEDIC SURGERY

## 2018-08-27 PROCEDURE — 160009 HCHG ANES TIME/MIN: Performed by: ORTHOPAEDIC SURGERY

## 2018-08-27 PROCEDURE — A6223 GAUZE >16<=48 NO W/SAL W/O B: HCPCS | Performed by: ORTHOPAEDIC SURGERY

## 2018-08-27 PROCEDURE — 160025 RECOVERY II MINUTES (STATS): Performed by: ORTHOPAEDIC SURGERY

## 2018-08-27 PROCEDURE — 0SPG04Z REMOVAL OF INTERNAL FIXATION DEVICE FROM LEFT ANKLE JOINT, OPEN APPROACH: ICD-10-PCS | Performed by: ORTHOPAEDIC SURGERY

## 2018-08-27 PROCEDURE — 73600 X-RAY EXAM OF ANKLE: CPT | Mod: LT

## 2018-08-27 PROCEDURE — 160036 HCHG PACU - EA ADDL 30 MINS PHASE I: Performed by: ORTHOPAEDIC SURGERY

## 2018-08-27 PROCEDURE — 160022 HCHG BLOCK: Performed by: ORTHOPAEDIC SURGERY

## 2018-08-27 PROCEDURE — 502240 HCHG MISC OR SUPPLY RC 0272: Performed by: ORTHOPAEDIC SURGERY

## 2018-08-27 PROCEDURE — A9270 NON-COVERED ITEM OR SERVICE: HCPCS

## 2018-08-27 PROCEDURE — 700111 HCHG RX REV CODE 636 W/ 250 OVERRIDE (IP)

## 2018-08-27 PROCEDURE — 306637 HCHG MISC ORTHO ITEM RC 0274

## 2018-08-27 PROCEDURE — 700111 HCHG RX REV CODE 636 W/ 250 OVERRIDE (IP): Performed by: ANESTHESIOLOGY

## 2018-08-27 PROCEDURE — 500881 HCHG PACK, EXTREMITY: Performed by: ORTHOPAEDIC SURGERY

## 2018-08-27 PROCEDURE — 503339 HCHG DRESSSING PICO: Performed by: ORTHOPAEDIC SURGERY

## 2018-08-27 PROCEDURE — A6454 SELF-ADHER BAND W>=3" <5"/YD: HCPCS | Performed by: ORTHOPAEDIC SURGERY

## 2018-08-27 PROCEDURE — 502000 HCHG MISC OR IMPLANTS RC 0278: Performed by: ORTHOPAEDIC SURGERY

## 2018-08-27 PROCEDURE — 160002 HCHG RECOVERY MINUTES (STAT): Performed by: ORTHOPAEDIC SURGERY

## 2018-08-27 PROCEDURE — 501838 HCHG SUTURE GENERAL: Performed by: ORTHOPAEDIC SURGERY

## 2018-08-27 PROCEDURE — 160048 HCHG OR STATISTICAL LEVEL 1-5: Performed by: ORTHOPAEDIC SURGERY

## 2018-08-27 PROCEDURE — 160035 HCHG PACU - 1ST 60 MINS PHASE I: Performed by: ORTHOPAEDIC SURGERY

## 2018-08-27 PROCEDURE — 0LSP0ZZ REPOSITION LEFT LOWER LEG TENDON, OPEN APPROACH: ICD-10-PCS | Performed by: ORTHOPAEDIC SURGERY

## 2018-08-27 PROCEDURE — A4306 DRUG DELIVERY SYSTEM <=50 ML: HCPCS | Performed by: ANESTHESIOLOGY

## 2018-08-27 DEVICE — IMPLANTABLE DEVICE: Type: IMPLANTABLE DEVICE | Site: ANKLE | Status: FUNCTIONAL

## 2018-08-27 RX ORDER — LIDOCAINE HYDROCHLORIDE 10 MG/ML
0.5 INJECTION, SOLUTION INFILTRATION; PERINEURAL
Status: DISCONTINUED | OUTPATIENT
Start: 2018-08-27 | End: 2018-08-27 | Stop reason: HOSPADM

## 2018-08-27 RX ORDER — LIDOCAINE HYDROCHLORIDE 10 MG/ML
INJECTION, SOLUTION INFILTRATION; PERINEURAL
Status: COMPLETED
Start: 2018-08-27 | End: 2018-08-27

## 2018-08-27 RX ORDER — SODIUM CHLORIDE, SODIUM LACTATE, POTASSIUM CHLORIDE, CALCIUM CHLORIDE 600; 310; 30; 20 MG/100ML; MG/100ML; MG/100ML; MG/100ML
INJECTION, SOLUTION INTRAVENOUS CONTINUOUS
Status: DISCONTINUED | OUTPATIENT
Start: 2018-08-27 | End: 2018-08-27 | Stop reason: HOSPADM

## 2018-08-27 RX ORDER — SCOLOPAMINE TRANSDERMAL SYSTEM 1 MG/1
1 PATCH, EXTENDED RELEASE TRANSDERMAL ONCE
Status: COMPLETED | OUTPATIENT
Start: 2018-08-27 | End: 2018-08-30

## 2018-08-27 RX ORDER — MAGNESIUM HYDROXIDE 1200 MG/15ML
LIQUID ORAL
Status: COMPLETED | OUTPATIENT
Start: 2018-08-27 | End: 2018-08-27

## 2018-08-27 RX ORDER — SCOLOPAMINE TRANSDERMAL SYSTEM 1 MG/1
PATCH, EXTENDED RELEASE TRANSDERMAL
Status: DISCONTINUED
Start: 2018-08-27 | End: 2018-08-27 | Stop reason: HOSPADM

## 2018-08-27 RX ORDER — MIDAZOLAM HYDROCHLORIDE 1 MG/ML
INJECTION INTRAMUSCULAR; INTRAVENOUS
Status: COMPLETED
Start: 2018-08-27 | End: 2018-08-27

## 2018-08-27 RX ADMIN — SODIUM CHLORIDE, SODIUM LACTATE, POTASSIUM CHLORIDE, CALCIUM CHLORIDE: 600; 310; 30; 20 INJECTION, SOLUTION INTRAVENOUS at 13:25

## 2018-08-27 RX ADMIN — MIDAZOLAM 1 MG: 1 INJECTION INTRAMUSCULAR; INTRAVENOUS at 17:50

## 2018-08-27 RX ADMIN — FENTANYL CITRATE 50 MCG: 50 INJECTION, SOLUTION INTRAMUSCULAR; INTRAVENOUS at 18:42

## 2018-08-27 RX ADMIN — FENTANYL CITRATE 50 MCG: 50 INJECTION, SOLUTION INTRAMUSCULAR; INTRAVENOUS at 17:50

## 2018-08-27 RX ADMIN — HYDROMORPHONE HYDROCHLORIDE 0.5 MG: 10 INJECTION, SOLUTION INTRAMUSCULAR; INTRAVENOUS; SUBCUTANEOUS at 18:40

## 2018-08-27 RX ADMIN — SCOPOLAMINE 1 PATCH: 1 PATCH, EXTENDED RELEASE TRANSDERMAL at 13:15

## 2018-08-27 RX ADMIN — LIDOCAINE HYDROCHLORIDE 0.5 ML: 10 INJECTION, SOLUTION INFILTRATION; PERINEURAL at 13:25

## 2018-08-27 RX ADMIN — SCOLOPAMINE TRANSDERMAL SYSTEM 1 PATCH: 1 PATCH, EXTENDED RELEASE TRANSDERMAL at 13:15

## 2018-08-27 ASSESSMENT — PAIN SCALES - GENERAL
PAINLEVEL_OUTOF10: 8
PAINLEVEL_OUTOF10: 6
PAINLEVEL_OUTOF10: 10
PAINLEVEL_OUTOF10: 0
PAINLEVEL_OUTOF10: 0
PAINLEVEL_OUTOF10: 10
PAINLEVEL_OUTOF10: 2
PAINLEVEL_OUTOF10: 0
PAINLEVEL_OUTOF10: 10

## 2018-08-28 NOTE — OP REPORT
DATE OF SERVICE:  8/27/2018     PREOPERATIVE DIAGNOSIS:  Left ankle arthritis.     POSTOPERATIVE DIAGNOSIS:  Left ankle arthritis.     PROCEDURE PERFORMED:  1.  Left total ankle arthroplasty.  2. Leftplacement of CHASE dressing.    3. Left Removal internal fixation medial and lateral, separate incisions  4. Left gastroc soleus recession.     SURGEON:  Rao Sarah MD     FIRST ASSISTANT:  Tera Alexander MD     SECOND ASSISTANT:  Dr. Nathalie Ricardo.     ANESTHESIA:  General endotracheal with popliteal block catheter per my request   for pain management.     ESTIMATED BLOOD LOSS:  None.     COMPLICATIONS:  None.     DRAINS:  Hemovac x1.     IMPLANTS:  [place] STAR total ankle arthroplasty, tibia Large, polyethylene 8,   talus XS.     POSTOPERATIVE PLAN:  1.  Perioperative antibiotics.  2.  Follow up in 2 weeks.  3.  Preoperative antibiotics.     INDICATIONS:  The patient is a pleasant 63 year old female.  The patient had problems    with their left ankle for some time.  The above diagnoses made and options were   discussed including operative and nonoperative.  The patient elected to undergo    operative intervention.  The procedure discussed and all questions were    answered.  Risks of surgery explained to include but not limited to wound    problems, infection, nerve injury, vascular injury, need for further surgery.     The patient understands the potential for persistent risk for pain, malunion, nonunion,    need for further surgery.  The patient understands and accepts these risks and agrees    to proceed.  The site was marked by myself prior to receiving psychotropic    medicines.     PROCEDURE IN DETAIL:  The patient was given a popliteal block and catheter per   my request for pain management.  They were then brought to the operating room and    underwent general endotracheal anesthesia without complications. The left  lower extremity was prepped and draped in standard fashion in supine position    with  all appropriate padding.  Positive site verification confirmed the patient's left   lower extremity as well as procedure and confirmation that the patient received    preoperative antibiotics.  Esmarch was used to exsanguinate the foot and ankle   and leg tourniquet was inflated to 250 mmHg.  Medial and lateral incisions were made over her previous ankle ORIF incisions.  Once the deep implants were exposed, all internal fixation was removed.  Wounds were irrigated out and closed in a layered fasion.  An anterior incision was made    centered over the anterior ankle.  It was dissected down.  The superficial    peroneal nerve was identified and protected distally.  The interval between    the tibialis anterior and extensor hallus longus was developed.  Neurovascular   bundle was identified and was retracted laterally.  This exposed the ankle    joint.  A guide pin was placed perpendicular to the long axis of the tibia.  Tibial   alignment jig was then placed over the top of this, was pinned in place    parallel to the long axis of the tibia.  This was confirmed on C-arm imaging    on AP view.  On lateral view, the brennon wing was placed and this was    identified to be perpendicular to the long axis of the tibia.  Tibial cut    height was adjusted approximately 5 mm at the subchondral bone margin and    medial and lateral cut was based off the shoulder of the tibia at the medial    malleolar aspect.  This was then pinned in place and the distal tibial    alignment jig was pinned in place.  Malleolar guide pins were placed for later   protection.  Distal tibia cut was made with a combination of oscillating saw    and reciprocating saw.  The tibial cutting jig was removed and all bone was    removed from the distal tibia.  Next, the superior talar cutting guide was    placed onto the tibial alignment jig.  This was then held in neutral position    with slight valgus.  It was pinned in place with talus directly against  the    paddle.  The superior cut was made with the oscillating saw.  This was    removed.  The talus was measured to be a XS.  The guide pin for the     talus was placed through the datum alignment jig was confirmed on    C-arm imaging on AP view to be centered on the talus on lateral view with the    nipple over the lateral process of the talus.  This was then removed and the    anterior, posterior cutting guide was placed over the datum alignment jig pin    and was pinned in place.  The posterior cut was made with the oscillating saw    and the anterior cut was made with the milling drill bit.  An anterior,    posterior cutting guide was then removed and/or guide pin the medial and    lateral cutting guide was then pinned in place into the tibia went to the    talus.  The medial and lateral cuts were made with the reciprocating saw and    bone of the medial and lateral gutters were removed as well as posterior    aspect of the tibia.  Window trial was then placed after the cutting guide was   removed.  It sat very nicely on the bone.  The keel was then drilled.  Window   trial was removed and was punched.  The window trial was also confirmed on    C-arm imaging to be directly against bone as well as direct visualization.     The tibia was then measured and turned to be a Large.  The gutters were   cleared of all soft tissue.  The talar component was then impacted in place    with the long side lateral.  The barrel guide was then set of the tibia on AP    and lateral views with the blue bar protecting the underlying talar component    was determined to be directly against bone on the lateral.  It was pinned in    place and both the medial and lateral barrels were drilled.  This was removed.    All bone was removed.  A thorough irrigation was performed.  Tibial    component was impacted in place with the blue bar protecting the underlying    talar component.  C-arm imaging confirmed tibia component to remain  directly    against the distal tibial bone.  A series of polyethylene trials were placed    and a 8 mm demonstrated good range of motion with very minimal lift off medial   and lateral.  Next, she did have a slight tightness in dorsiflexion with the    knee extended and this improved the knee bent suggesting gastroc contracture.  A    posteromedial incision was made over the musculotendinous junction of the    gastroc.  It was brought down to and through the crural fascia.  This exposed    the underlying gastroc tendon.  Using a rake to pull the gastroc under direct    visualization, gastroc was released from lateral to medial under direct    visualization to avoid injury to the sural nerve.  Once this was completed,    there was much improved dorsiflexion with the knee extended.  The wound was    irrigated with copious irrigation and was closed in layered fashion with 3-0    Vicryl and 3-0 nylon.  Next, using C-arm imaging, the trial was exchanged   for a 8 mm implant.  Final C-arm imaging demonstrates satisfactory position for internal    fixation alignment.  The patient had excellent range of motion.  No gross instability.    Wounds were irrigated with copious irrigation.  Hemovac drain was placed.     The anterior wound was closed with multiple layers closing the capsular layer    followed by an extensor retinacular layer followed by subcutaneous layer, all    with 3-0 Vicryl and 3-0 nylon for the skin.  Sterile dressings were applied    including a negative pressure incisional dressing, which demonstrated a    positive seal.  The patient was placed into a bulky Whyte splint and woke from    general anesthesia without complications and was transferred to the recovery    room in good condition.  Next, utilization of Dr. Alexander was necessary for   patient positioning, holding, retracting, provisional and definitive    fixation, wound closure, dressing and splint placement.  He was present    throughout the  entire procedure.

## 2018-08-28 NOTE — DISCHARGE INSTRUCTIONS
ACTIVITY: Rest and take it easy for the first 24 hours.  A responsible adult is recommended to remain with you during that time.  It is normal to feel sleepy.  We encourage you to not do anything that requires balance, judgment or coordination.    MILD FLU-LIKE SYMPTOMS ARE NORMAL. YOU MAY EXPERIENCE GENERALIZED MUSCLE ACHES, THROAT IRRITATION, HEADACHE AND/OR SOME NAUSEA.    FOR 24 HOURS DO NOT:  Drive, operate machinery or run household appliances.  Drink beer or alcoholic beverages.   Make important decisions or sign legal documents.    SPECIAL INSTRUCTIONS:   Non Weight Bearing Left Lower Extremity   Elevate Left Lower Extremity above heart to decrease pain and swelling   Pull Hemovac drain on Post Op Day #1   Pull OnQ catheter on Post Op Day #3   Turn off CHASE vac on Post Op Day #7 but leave dressing in place   Keep splint clean, dry and intact until follow up   Resume regular diet   Follow up with Dr. Sarah in 2 weeks    DIET: To avoid nausea, slowly advance diet as tolerated, avoiding spicy or greasy foods for the first day.  Add more substantial food to your diet according to your physician's instructions.  Babies can be fed formula or breast milk as soon as they are hungry.  INCREASE FLUIDS AND FIBER TO AVOID CONSTIPATION.    SURGICAL DRESSING/BATHING: Keep dressing clean, dry and intact. Pull Hemovac drain on POD#1. Pull OnQ catheter on POD#3. Turn off CHASE vac on POD#7 but leave dressing in place.     FOLLOW-UP APPOINTMENT:  A follow-up appointment should be arranged with your doctor in 2 weeks; call to schedule.    You should CALL YOUR PHYSICIAN if you develop:  Fever greater than 101 degrees F.  Pain not relieved by medication, or persistent nausea or vomiting.  Excessive bleeding (blood soaking through dressing) or unexpected drainage from the wound.  Extreme redness or swelling around the incision site, drainage of pus or foul smelling drainage.  Inability to urinate or empty your bladder within 8  hours.  Problems with breathing or chest pain.    You should call 911 if you develop problems with breathing or chest pain.  If you are unable to contact your doctor or surgical center, you should go to the nearest emergency room or urgent care center.  Physician's telephone #: [phone]    If any questions arise, call your doctor.  If your doctor is not available, please feel free to call the Surgical Center at [phone].  The Center is open Monday through Friday from 7AM to 7PM.  You can also call the HEALTH HOTLINE open 24 hours/day, 7 days/week and speak to a nurse at [phone], or toll free at [phone].    A registered nurse may call you a few days after your surgery to see how you are doing after your procedure.    MEDICATIONS: Resume taking daily medication.  Take prescribed pain medication with food.  If no medication is prescribed, you may take non-aspirin pain medication if needed.  PAIN MEDICATION CAN BE VERY CONSTIPATING.  Take a stool softener or laxative such as senokot, pericolace, or milk of magnesia if needed.    Prescriptions given to family.  Pain medication may be taken any time.    If your physician has prescribed pain medication that includes Acetaminophen (Tylenol), do not take additional Acetaminophen (Tylenol) while taking the prescribed medication.    Depression / Suicide Risk    As you are discharged from this [facility] facility, it is important to learn how to keep safe from harming yourself.    Recognize the warning signs:  · Abrupt changes in personality, positive or negative- including increase in energy   · Giving away possessions  · Change in eating patterns- significant weight changes-  positive or negative  · Change in sleeping patterns- unable to sleep or sleeping all the time   · Unwillingness or inability to communicate  · Depression  · Unusual sadness, discouragement and loneliness  · Talk of wanting to die  · Neglect of personal  appearance   · Rebelliousness- reckless behavior  · Withdrawal from people/activities they love  · Confusion- inability to concentrate     If you or a loved one observes any of these behaviors or has concerns about self-harm, here's what you can do:  · Talk about it- your feelings and reasons for harming yourself  · Remove any means that you might use to hurt yourself (examples: pills, rope, extension cords, firearm)  · Get professional help from the community (Mental Health, Substance Abuse, psychological counseling)  · Do not be alone:Call your Safe Contact- someone whom you trust who will be there for you.  · Call your local CRISIS HOTLINE 757-7271 or 979-442-2015  · Call your local Children's Mobile Crisis Response Team [place] (406) 514-6463 or www.Embrace+  · Call the toll free National Suicide Prevention Hotlines   · National Suicide Prevention Lifeline 984-312-EPKF (8988)  · National Hope Line Network 800-SUICIDE (721-1243)

## 2018-09-13 ENCOUNTER — NON-PROVIDER VISIT (OUTPATIENT)
Dept: MEDICAL GROUP | Facility: MEDICAL CENTER | Age: 63
End: 2018-09-13
Payer: COMMERCIAL

## 2018-09-13 DIAGNOSIS — Z23 NEED FOR VACCINATION: ICD-10-CM

## 2018-09-13 PROCEDURE — 90686 IIV4 VACC NO PRSV 0.5 ML IM: CPT | Performed by: FAMILY MEDICINE

## 2018-09-13 PROCEDURE — 90471 IMMUNIZATION ADMIN: CPT | Performed by: FAMILY MEDICINE

## 2018-09-13 NOTE — PROGRESS NOTES
"Uzma Gamez is a 63 y.o. female here for a non-provider visit for:   FLU    Reason for immunization: Annual Flu Vaccine  Immunization records indicate need for vaccine: Yes, confirmed with Epic  Minimum interval has been met for this vaccine: Yes  ABN completed: No    Order and dose verified by: SUDHEER  VIS Dated  8/15/17 was given to patient: Yes  All IAC Questionnaire questions were answered \"No.\"    Patient tolerated injection and no adverse effects were observed or reported: Yes    Pt scheduled for next dose in series: No  "

## 2018-11-06 ENCOUNTER — TELEPHONE (OUTPATIENT)
Dept: MEDICAL GROUP | Facility: MEDICAL CENTER | Age: 63
End: 2018-11-06

## 2018-11-06 DIAGNOSIS — Z12.31 ENCOUNTER FOR SCREENING MAMMOGRAM FOR BREAST CANCER: ICD-10-CM

## 2018-11-06 DIAGNOSIS — Z12.11 COLON CANCER SCREENING: ICD-10-CM

## 2018-11-07 ENCOUNTER — TELEPHONE (OUTPATIENT)
Dept: MEDICAL GROUP | Facility: MEDICAL CENTER | Age: 63
End: 2018-11-07

## 2018-11-07 DIAGNOSIS — Z12.31 ENCOUNTER FOR SCREENING MAMMOGRAM FOR BREAST CANCER: ICD-10-CM

## 2018-11-07 DIAGNOSIS — Z12.11 COLON CANCER SCREENING: ICD-10-CM

## 2018-11-07 NOTE — TELEPHONE ENCOUNTER
ICD-10 codes need to be switched around for patient's mammogram order and gastroenterology referral.

## 2018-11-30 ENCOUNTER — HOSPITAL ENCOUNTER (OUTPATIENT)
Dept: RADIOLOGY | Facility: MEDICAL CENTER | Age: 63
End: 2018-11-30
Attending: FAMILY MEDICINE
Payer: COMMERCIAL

## 2018-11-30 DIAGNOSIS — Z12.31 ENCOUNTER FOR SCREENING MAMMOGRAM FOR BREAST CANCER: ICD-10-CM

## 2018-11-30 PROCEDURE — 77067 SCR MAMMO BI INCL CAD: CPT

## 2019-01-11 ENCOUNTER — OFFICE VISIT (OUTPATIENT)
Dept: MEDICAL GROUP | Facility: MEDICAL CENTER | Age: 64
End: 2019-01-11
Payer: COMMERCIAL

## 2019-01-11 ENCOUNTER — HOSPITAL ENCOUNTER (OUTPATIENT)
Dept: RADIOLOGY | Facility: MEDICAL CENTER | Age: 64
End: 2019-01-11
Attending: INTERNAL MEDICINE
Payer: COMMERCIAL

## 2019-01-11 VITALS
OXYGEN SATURATION: 94 % | DIASTOLIC BLOOD PRESSURE: 80 MMHG | BODY MASS INDEX: 31.02 KG/M2 | HEART RATE: 74 BPM | HEIGHT: 66 IN | TEMPERATURE: 97.8 F | WEIGHT: 193 LBS | SYSTOLIC BLOOD PRESSURE: 102 MMHG
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
## 2019-01-11 DIAGNOSIS — R07.1 CHEST PAIN ON BREATHING: ICD-10-CM

## 2019-01-11 PROCEDURE — 71046 X-RAY EXAM CHEST 2 VIEWS: CPT

## 2019-01-11 PROCEDURE — 99213 OFFICE O/P EST LOW 20 MIN: CPT | Performed by: INTERNAL MEDICINE

## 2019-01-11 NOTE — ASSESSMENT & PLAN NOTE
On December 30 this patient apparently was standing on her toilet when she fell and struck her anterior chest on the rim of the bathtub.  She has had persistent pain there since and came in today to have it checked.  She denies fever or chills or cough or dyspnea.  She does have increasing dyspnea with movement of her chest or with coughing or sneezing.

## 2019-01-11 NOTE — PROGRESS NOTES
Subjective:     Chief Complaint   Patient presents with   • Chest Pain     Fell in tub, x12 days ago      Uzma Gamez is a 63 y.o. female here today for evaluation of musculoskeletal chest pain after she fell while standing on her toilet and striking her anterior chest on the rim of her bathtub.  This occurred on December 30.    Fall at home, initial encounter  On December 30 this patient apparently was standing on her toilet when she fell and struck her anterior chest on the rim of the bathtub.  She has had persistent pain there since and came in today to have it checked.  She denies fever or chills or cough or dyspnea.  She does have increasing dyspnea with movement of her chest or with coughing or sneezing.       Diagnoses of Chest pain on breathing and Fall at home, initial encounter were pertinent to this visit.    Allergies: Nkda [no known drug allergy]  Current medicines (including changes today)  Current Outpatient Prescriptions   Medication Sig Dispense Refill   • SUPER B COMPLEX/C PO Take 1 Tab by mouth every day.     • Biotin 1000 MCG Tab Take 500 mcg by mouth every day.     • alendronate (FOSAMAX) 70 MG Tab TAKE 1 TAB BY MOUTH EVERY 7 DAYS. 12 Tab 3   • atorvastatin (LIPITOR) 20 MG Tab TAKE 1 TAB BY MOUTH EVERY BEDTIME. 90 Tab 3   • aspirin (ASA) 81 MG Chew Tab chewable tablet Take 81 mg by mouth every day.     • Omega-3 Fatty Acids (OMEGA 3 PO) Take 1,500 mg by mouth every day.     • Calcium Carbonate-Vitamin D 600-400 MG-UNIT Tab Take 1 Tab by mouth 2 Times a Day.     • vitamin D (CHOLECALCIFEROL) 1000 UNIT Tab Take 2,000 Units by mouth every day.     • Polyvinyl Alcohol-Povidone (REFRESH OP) Place 1 Drop in both eyes every day.       No current facility-administered medications for this visit.        She  has a past medical history of Anesthesia; Arthritis; Bilateral cataracts; Fall at home, initial encounter (1/11/2019); H/O: hysterectomy (6/5/2009); High cholesterol; Plantar fasciitis  "(6/5/2009); and Previous back surgery (6/5/2009).    ROS    Patient denies significant change in strength, weight or appetite.  No significant lightheadedness or headaches.  No change in vision, hearing, or swallowing.  No new dyspnea, coughing, chest pain, or palpitations except as noted above with musculoskeletal anterior chest pain that is worse with coughing or deep breaths or sneezing..  No indigestion, abdominal pain, or change in bowel habits.  No change in urinating.  No new ankle swelling.       Objective:     PE:  /80 (BP Location: Left arm, Patient Position: Sitting)   Pulse 74   Temp 36.6 °C (97.8 °F)   Ht 1.676 m (5' 6\")   Wt 87.5 kg (193 lb)   SpO2 94%   BMI 31.15 kg/m²    Neck is supple without significant lymphadenopathy or masses.  Lungs are clear with normal breath sounds without wheezes or rales .  Palpation of the anterior chest reveals multiple areas of tenderness.  There is no significant crepitus or other palpable abnormality.  The mid to lower sternal area is tender to palpation.  Cardiovascular: peripheral circulation is satisfactory, heart sounds are unremarkable.  Abdomen is soft, without masses or tenderness, with normal bowel sounds.  Extremities are without significant edema, cyanosis or deformity.      Assessment and Plan:   The following treatment plan was discussed  1. Chest pain on breathing  DX-CHEST-2 VIEWS    Deep breaths with pillow for splinting.  Go to ER if fever or chills or increased cough or dyspnea.  Check chest x-ray.   2. Fall at home, initial encounter      Splinting with pillow for deep breaths.  Check chest x-ray.  Go to ER if worsening of pain or dyspnea or fever.       Followup: She will follow-up regularly with Dr. Cifuentes.           "

## 2019-02-22 ENCOUNTER — HOSPITAL ENCOUNTER (OUTPATIENT)
Dept: LAB | Facility: MEDICAL CENTER | Age: 64
End: 2019-02-22
Attending: FAMILY MEDICINE
Payer: COMMERCIAL

## 2019-02-22 DIAGNOSIS — R73.01 ELEVATED FASTING BLOOD SUGAR: ICD-10-CM

## 2019-02-22 DIAGNOSIS — I25.10 CORONARY ARTERY DISEASE INVOLVING NATIVE CORONARY ARTERY OF NATIVE HEART WITHOUT ANGINA PECTORIS: Chronic | ICD-10-CM

## 2019-02-22 DIAGNOSIS — E78.00 PURE HYPERCHOLESTEROLEMIA: Chronic | ICD-10-CM

## 2019-02-22 LAB
BASOPHILS # BLD AUTO: 0.9 % (ref 0–1.8)
BASOPHILS # BLD: 0.05 K/UL (ref 0–0.12)
EOSINOPHIL # BLD AUTO: 0.15 K/UL (ref 0–0.51)
EOSINOPHIL NFR BLD: 2.7 % (ref 0–6.9)
ERYTHROCYTE [DISTWIDTH] IN BLOOD BY AUTOMATED COUNT: 42.1 FL (ref 35.9–50)
EST. AVERAGE GLUCOSE BLD GHB EST-MCNC: 117 MG/DL
HBA1C MFR BLD: 5.7 % (ref 0–5.6)
HCT VFR BLD AUTO: 43.1 % (ref 37–47)
HGB BLD-MCNC: 13.9 G/DL (ref 12–16)
IMM GRANULOCYTES # BLD AUTO: 0 K/UL (ref 0–0.11)
IMM GRANULOCYTES NFR BLD AUTO: 0 % (ref 0–0.9)
LYMPHOCYTES # BLD AUTO: 2.44 K/UL (ref 1–4.8)
LYMPHOCYTES NFR BLD: 43.9 % (ref 22–41)
MCH RBC QN AUTO: 31.4 PG (ref 27–33)
MCHC RBC AUTO-ENTMCNC: 32.3 G/DL (ref 33.6–35)
MCV RBC AUTO: 97.3 FL (ref 81.4–97.8)
MONOCYTES # BLD AUTO: 0.5 K/UL (ref 0–0.85)
MONOCYTES NFR BLD AUTO: 9 % (ref 0–13.4)
NEUTROPHILS # BLD AUTO: 2.42 K/UL (ref 2–7.15)
NEUTROPHILS NFR BLD: 43.5 % (ref 44–72)
NRBC # BLD AUTO: 0 K/UL
NRBC BLD-RTO: 0 /100 WBC
PLATELET # BLD AUTO: 301 K/UL (ref 164–446)
PMV BLD AUTO: 9.9 FL (ref 9–12.9)
RBC # BLD AUTO: 4.43 M/UL (ref 4.2–5.4)
WBC # BLD AUTO: 5.6 K/UL (ref 4.8–10.8)

## 2019-02-22 PROCEDURE — 85025 COMPLETE CBC W/AUTO DIFF WBC: CPT

## 2019-02-22 PROCEDURE — 36415 COLL VENOUS BLD VENIPUNCTURE: CPT

## 2019-02-22 PROCEDURE — 83036 HEMOGLOBIN GLYCOSYLATED A1C: CPT

## 2019-02-22 PROCEDURE — 80061 LIPID PANEL: CPT

## 2019-02-22 PROCEDURE — 84443 ASSAY THYROID STIM HORMONE: CPT

## 2019-02-22 PROCEDURE — 80053 COMPREHEN METABOLIC PANEL: CPT

## 2019-02-23 LAB
ALBUMIN SERPL BCP-MCNC: 4.2 G/DL (ref 3.2–4.9)
ALBUMIN/GLOB SERPL: 1.7 G/DL
ALP SERPL-CCNC: 62 U/L (ref 30–99)
ALT SERPL-CCNC: 15 U/L (ref 2–50)
ANION GAP SERPL CALC-SCNC: 7 MMOL/L (ref 0–11.9)
AST SERPL-CCNC: 15 U/L (ref 12–45)
BILIRUB SERPL-MCNC: 0.7 MG/DL (ref 0.1–1.5)
BUN SERPL-MCNC: 25 MG/DL (ref 8–22)
CALCIUM SERPL-MCNC: 9.4 MG/DL (ref 8.5–10.5)
CHLORIDE SERPL-SCNC: 105 MMOL/L (ref 96–112)
CHOLEST SERPL-MCNC: 133 MG/DL (ref 100–199)
CO2 SERPL-SCNC: 28 MMOL/L (ref 20–33)
CREAT SERPL-MCNC: 0.7 MG/DL (ref 0.5–1.4)
FASTING STATUS PATIENT QL REPORTED: NORMAL
GLOBULIN SER CALC-MCNC: 2.5 G/DL (ref 1.9–3.5)
GLUCOSE SERPL-MCNC: 88 MG/DL (ref 65–99)
HDLC SERPL-MCNC: 73 MG/DL
LDLC SERPL CALC-MCNC: 43 MG/DL
POTASSIUM SERPL-SCNC: 3.8 MMOL/L (ref 3.6–5.5)
PROT SERPL-MCNC: 6.7 G/DL (ref 6–8.2)
SODIUM SERPL-SCNC: 140 MMOL/L (ref 135–145)
TRIGL SERPL-MCNC: 87 MG/DL (ref 0–149)
TSH SERPL DL<=0.005 MIU/L-ACNC: 2 UIU/ML (ref 0.38–5.33)

## 2019-03-01 ENCOUNTER — OFFICE VISIT (OUTPATIENT)
Dept: MEDICAL GROUP | Facility: MEDICAL CENTER | Age: 64
End: 2019-03-01
Payer: MEDICARE

## 2019-03-01 VITALS
WEIGHT: 191 LBS | SYSTOLIC BLOOD PRESSURE: 108 MMHG | BODY MASS INDEX: 30.7 KG/M2 | HEIGHT: 66 IN | TEMPERATURE: 97.9 F | DIASTOLIC BLOOD PRESSURE: 68 MMHG | OXYGEN SATURATION: 95 % | RESPIRATION RATE: 16 BRPM | HEART RATE: 80 BPM

## 2019-03-01 DIAGNOSIS — E55.9 VITAMIN D INSUFFICIENCY: Chronic | ICD-10-CM

## 2019-03-01 DIAGNOSIS — E78.00 PURE HYPERCHOLESTEROLEMIA: Chronic | ICD-10-CM

## 2019-03-01 DIAGNOSIS — G50.0 TRIGEMINAL NEURALGIA PAIN: ICD-10-CM

## 2019-03-01 DIAGNOSIS — R73.01 ELEVATED FASTING BLOOD SUGAR: ICD-10-CM

## 2019-03-01 DIAGNOSIS — Z00.00 MEDICARE ANNUAL WELLNESS VISIT, INITIAL: ICD-10-CM

## 2019-03-01 DIAGNOSIS — M85.89 OSTEOPENIA OF MULTIPLE SITES: ICD-10-CM

## 2019-03-01 DIAGNOSIS — I25.10 CORONARY ARTERY DISEASE INVOLVING NATIVE CORONARY ARTERY OF NATIVE HEART WITHOUT ANGINA PECTORIS: Chronic | ICD-10-CM

## 2019-03-01 DIAGNOSIS — M19.172 POST-TRAUMATIC ARTHRITIS OF LEFT ANKLE: ICD-10-CM

## 2019-03-01 DIAGNOSIS — E66.9 OBESITY (BMI 30-39.9): ICD-10-CM

## 2019-03-01 DIAGNOSIS — R73.03 PREDIABETES: ICD-10-CM
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
## 2019-03-01 PROCEDURE — G0402 INITIAL PREVENTIVE EXAM: HCPCS | Performed by: FAMILY MEDICINE

## 2019-03-01 RX ORDER — GABAPENTIN 300 MG/1
300 CAPSULE ORAL 3 TIMES DAILY
COMMUNITY
End: 2019-03-01

## 2019-03-01 RX ORDER — ATORVASTATIN CALCIUM 20 MG/1
20 TABLET, FILM COATED ORAL
Qty: 90 TAB | Refills: 3 | Status: SHIPPED | OUTPATIENT
Start: 2019-03-01 | End: 2019-03-29 | Stop reason: SDUPTHER

## 2019-03-01 RX ORDER — GINSENG 100 MG
CAPSULE ORAL
COMMUNITY
End: 2019-09-05

## 2019-03-01 RX ORDER — RISEDRONATE SODIUM 150 MG/1
1 TABLET, FILM COATED ORAL
Qty: 3 TAB | Refills: 3 | Status: SHIPPED | OUTPATIENT
Start: 2019-03-01 | End: 2020-03-02

## 2019-03-01 ASSESSMENT — ENCOUNTER SYMPTOMS: GENERAL WELL-BEING: GOOD

## 2019-03-01 ASSESSMENT — PATIENT HEALTH QUESTIONNAIRE - PHQ9: CLINICAL INTERPRETATION OF PHQ2 SCORE: 0

## 2019-03-01 ASSESSMENT — ACTIVITIES OF DAILY LIVING (ADL): BATHING_REQUIRES_ASSISTANCE: 0

## 2019-03-01 NOTE — PROGRESS NOTES
Chief Complaint   Patient presents with   • Annual Exam         HPI:  Uzma Gamez is a 63 y.o. here for Medicare Annual Wellness Visit     Patient Active Problem List    Diagnosis Date Noted   • Post-traumatic arthritis of left ankle 08/27/2018   • Trigeminal neuralgia pain 02/23/2018   • Obesity (BMI 30-39.9) 11/22/2017   • Elevated fasting blood sugar 02/22/2017   • CAD (coronary artery disease) 12/05/2016   • S/P ORIF (open reduction internal fixation) fracture 12/02/2016   • Vitamin D insufficiency 02/12/2016   • Osteopenia 10/12/2015   • Hyperpigmentation of skin 10/28/2014   • Hx of cataract surgery 03/26/2014   • Hyperlipidemia 03/26/2014   • Previous back surgery 06/05/2009   • H/O: hysterectomy 06/05/2009       Current Outpatient Prescriptions   Medication Sig Dispense Refill   • Zinc 50 MG Tab Take  by mouth.     • Melatonin 5 MG Cap Take  by mouth.     • Diclofenac Sodium (VOLTAREN) 1 % Gel Apply  to skin as directed.     • atorvastatin (LIPITOR) 20 MG Tab Take 1 Tab by mouth every bedtime. 90 Tab 3   • Risedronate Sodium (ACTONEL) 150 MG Tab Take 1 Tab by mouth Q30 DAYS. 3 Tab 3   • Polyvinyl Alcohol-Povidone (REFRESH OP) Place 1 Drop in both eyes every day.     • SUPER B COMPLEX/C PO Take 1 Tab by mouth every day.     • Biotin 1000 MCG Tab Take 500 mcg by mouth every day.     • aspirin (ASA) 81 MG Chew Tab chewable tablet Take 81 mg by mouth every day.     • Omega-3 Fatty Acids (OMEGA 3 PO) Take 1,500 mg by mouth every day.     • Calcium Carbonate-Vitamin D 600-400 MG-UNIT Tab Take 1 Tab by mouth 2 Times a Day.     • vitamin D (CHOLECALCIFEROL) 1000 UNIT Tab Take 2,000 Units by mouth every day.       No current facility-administered medications for this visit.             Current supplements as per medication list.       Allergies: Nkda [no known drug allergy]    Current social contact/activities: lives with .     She  reports that she quit smoking about 40 years ago. Her smoking use included  Cigarettes. She has a 3.00 pack-year smoking history. She has never used smokeless tobacco. She reports that she drinks alcohol. She reports that she does not use drugs.  Counseling given: Not Answered      DPA/Advanced Directive:  Patient has Advanced Directive, but it is not on file. Instructed to bring in a copy to scan into their chart.    ROS:    Gait: Uses no assistive device  Ostomy: No  Other tubes: No  Amputations: No  Chronic oxygen use: No  Last eye exam: 3 weeks ago.  Wears hearing aids: No   : Reports urinary leakage during the last 6 months that has not interfered at all with their daily activities or sleep.      Depression Screening    Little interest or pleasure in doing things?  0 - not at all  Feeling down, depressed , or hopeless? 0 - not at all  Trouble falling or staying asleep, or sleeping too much?     Feeling tired or having little energy?     Poor appetite or overeating?     Feeling bad about yourself - or that you are a failure or have let yourself or your family down?    Trouble concentrating on things, such as reading the newspaper or watching television?    Moving or speaking so slowly that other people could have noticed.  Or the opposite - being so fidgety or restless that you have been moving around a lot more than usual?     Thoughts that you would be better off dead, or of hurting yourself?     Patient Health Questionnaire Score:      If depressive symptoms identified deferred to follow up visit unless specifically addressed in assessment and plan.    Interpretation of PHQ-9 Total Score   Score Severity   1-4 No Depression   5-9 Mild Depression   10-14 Moderate Depression   15-19 Moderately Severe Depression   20-27 Severe Depression      Screening for Cognitive Impairment    Three Minute Recall (leader, season, table) 3/3    [place] clock face with all 12 numbers and set the hands to show 10 past 11.  Yes    Cognitive concerns identified deferred for follow up unless specifically  addressed in assessment and plan.    Fall Risk Assessment    Has the patient had two or more falls in the last year or any fall with injury in the last year?  No    Safety Assessment    Throw rugs on floor.  Yes  Handrails on all stairs.  Yes  Good lighting in all hallways.  Yes  Difficulty hearing.  No  Patient counseled about all safety risks that were identified.    Functional Assessment ADLs    Are there any barriers preventing you from cooking for yourself or meeting nutritional needs?  No.    Are there any barriers preventing you from driving safely or obtaining transportation?  No.    Are there any barriers preventing you from using a telephone or calling for help?  No.    Are there any barriers preventing you from shopping?  No.    Are there any barriers preventing you from taking care of your own finances?  No.    Are there any barriers preventing you from managing your medications?  No.    Are there any barriers preventing you from showering, bathing or dressing yourself? No.    Are you currently engaging in any exercise or physical activity?  Yes.     What is your perception of your health?  Good.      Health Maintenance Summary                IMM ZOSTER VACCINES Overdue 11/19/2014      Done 9/24/2014 Imm Admin: Zoster Vaccine Live (ZVL) (Zostavax)    MAMMOGRAM Next Due 11/30/2019      Done 11/30/2018 MA-SCREEN MAMMO W/CAD-BILAT     Patient has more history with this topic...    IMM DTaP/Tdap/Td Vaccine Next Due 10/12/2025      Done 10/12/2015 Imm Admin: Tdap Vaccine    COLONOSCOPY Next Due 12/12/2028      Done 12/12/2018 REFERRAL TO GI FOR COLONOSCOPY     Patient has more history with this topic...          Patient Care Team:  Nabil Cifuentes M.D. as PCP - General (Family Medicine)  Loida Bryan M.D. as Consulting Physician (OB/Gyn)      Social History   Substance Use Topics   • Smoking status: Former Smoker     Packs/day: 0.50     Years: 6.00     Types: Cigarettes     Quit date: 9/8/1978   • Smokeless  "tobacco: Never Used   • Alcohol use 0.0 oz/week      Comment: 1/wk     Family History   Problem Relation Age of Onset   • Diabetes Mother    • Cancer Father         LUNG   • Diabetes Brother      She  has a past medical history of Anesthesia; Arthritis; Bilateral cataracts; Fall at home, initial encounter (1/11/2019); H/O: hysterectomy (6/5/2009); High cholesterol; Plantar fasciitis (6/5/2009); and Previous back surgery (6/5/2009).   Past Surgical History:   Procedure Laterality Date   • ANKLE TOTAL ARTHROPLASTY Left 8/27/2018    Procedure: ANKLE TOTAL ARTHROPLASTY;  Surgeon: Rao Sarah M.D.;  Location: SURGERY [facility];  Service: Orthopedics   • ANKLE ORIF Left 11/28/2017   • ORTHOPEDIC OSTEOTOMY Left 8/10/2016    Procedure: ORTHOPEDIC OSTEOTOMY - FOR ULNAR SHORTENING OSTEOTOMY;  Surgeon: Jerrell Linn M.D.;  Location: SURGERY [facility];  Service:    • HYSTERECTOMY, VAGINAL  2009   • OTHER NEUROLOGICAL SURG  2003    back surgery   • CERVICAL LAMINECTOMY POSTERIOR     • HYSTERECTOMY LAPAROSCOPY     • OTHER      bilat cataracts removal       Exam:   Blood pressure 108/68, pulse 80, temperature 36.6 °C (97.9 °F), temperature source Temporal, resp. rate 16, height 1.676 m (5' 5.98\"), weight 86.6 kg (191 lb), SpO2 95 %, not currently breastfeeding. Body mass index is 30.84 kg/m².    Constitutional: Alert, no distress.  Skin: Warm, dry, good turgor, no rashes in visible areas.  Eye: Equal, round and reactive, conjunctiva clear, lids normal.  Psych: Alert and oriented x3, normal affect and mood.      Assessment and Plan. The following treatment and monitoring plan is recommended:      1. Medicare annual wellness visit, initial  Advised healthy lifestyle.    2. Pure hypercholesterolemia  Controlled.  Continue atorvastatin.  Follow-up with labs annually.  - atorvastatin (LIPITOR) 20 MG Tab; Take 1 Tab by mouth every bedtime.  Dispense: 90 Tab; Refill: 3  - Comp Metabolic Panel; Future  - Lipid " Profile; Future  - TSH WITH REFLEX TO FT4; Future    3. Elevated fasting blood sugar  Advised diet and exercise.  Follow-up with labs annually.  - HEMOGLOBIN A1C; Future    4. Prediabetes  Advised diet and exercise.  Follow-up with labs annually.    5. Obesity (BMI 30-39.9)  Advised diet and exercise.    6. Coronary artery disease involving native coronary artery of native heart without angina pectoris  Stable.  Continue atorvastatin and aspirin.  - CBC WITH DIFFERENTIAL; Future    7. Vitamin D insufficiency  Stable.  Continue  vitamin D supplement.    8. Trigeminal neuralgia pain  Improved spontaneously.  Patient is planning to wean off of gabapentin which she was prescribed for her left ankle pain/surgery.    9. Osteopenia of multiple sites  Patient requesting to switch from Fosamax, once weekly, to Actonel, once monthly.  - Risedronate Sodium (ACTONEL) 150 MG Tab; Take 1 Tab by mouth Q30 DAYS.  Dispense: 3 Tab; Refill: 3    10. Post-traumatic arthritis of left ankle  Stable.  Continue following with orthopedic.        Services suggested: No services needed at this time  Health Care Screening: Age-appropriate preventive services recommended by USPTF and ACIP covered by Medicare were discussed today. Services ordered if indicated and agreed upon by the patient.  Referrals offered: Community-based lifestyle interventions to reduce health risks and promote self-management and wellness, fall prevention, nutrition, physical activity, tobacco-use cessation, weight loss, and mental health services as per orders if indicated.    Discussion today about general wellness and lifestyle habits:    · Prevent falls and reduce trip hazards; Cautioned about securing or removing rugs.  · Have a working fire alarm and carbon monoxide detector;   · Engage in regular physical activity and social activities     Follow-up: Return in about 1 year (around 3/1/2020) for Annual.

## 2019-03-18 ENCOUNTER — TELEPHONE (OUTPATIENT)
Dept: MEDICAL GROUP | Facility: MEDICAL CENTER | Age: 64
End: 2019-03-18

## 2019-03-18 DIAGNOSIS — G50.0 TRIGEMINAL NEURALGIA PAIN: ICD-10-CM

## 2019-03-18 RX ORDER — GABAPENTIN 300 MG/1
300 CAPSULE ORAL 3 TIMES DAILY
Qty: 270 CAP | Refills: 3 | Status: SHIPPED | OUTPATIENT
Start: 2019-03-18 | End: 2019-09-05

## 2019-03-18 NOTE — TELEPHONE ENCOUNTER
Patient requesting rx for [facility] [address]. Please advise.    Was the patient seen in the last year in this department? Yes    Does patient have an active prescription for medications requested? No     Received Request Via: Pharmacy

## 2019-03-22 ENCOUNTER — NON-PROVIDER VISIT (OUTPATIENT)
Dept: MEDICAL GROUP | Facility: MEDICAL CENTER | Age: 64
End: 2019-03-22
Payer: MEDICARE

## 2019-03-22 DIAGNOSIS — Z23 NEED FOR VACCINATION: ICD-10-CM

## 2019-03-22 PROCEDURE — G0009 ADMIN PNEUMOCOCCAL VACCINE: HCPCS | Performed by: FAMILY MEDICINE

## 2019-03-22 PROCEDURE — 90670 PCV13 VACCINE IM: CPT | Performed by: FAMILY MEDICINE

## 2019-03-22 NOTE — PROGRESS NOTES
"Uzma Gamez is a 64 y.o. female here for a non-provider visit for:   PREVNAR (PCV13) 1 of 1    Reason for immunization: continue or complete series started at the office  Immunization records indicate need for vaccine: Yes, confirmed with Epic  Minimum interval has been met for this vaccine: Yes  ABN completed: No    Order and dose verified by: MERY GOLDSTEIN Dated  11/05/2015 was given to patient: Yes  All IAC Questionnaire questions were answered \"No.\"    Patient tolerated injection and no adverse effects were observed or reported: Yes    Pt scheduled for next dose in series: No  "

## 2019-03-29 DIAGNOSIS — E78.00 PURE HYPERCHOLESTEROLEMIA: Chronic | ICD-10-CM

## 2019-03-29 RX ORDER — ATORVASTATIN CALCIUM 20 MG/1
20 TABLET, FILM COATED ORAL
Qty: 90 TAB | Refills: 3 | Status: SHIPPED | OUTPATIENT
Start: 2019-03-29 | End: 2019-08-01 | Stop reason: SDUPTHER

## 2019-04-01 DIAGNOSIS — M54.9 CHRONIC BACK PAIN, UNSPECIFIED BACK LOCATION, UNSPECIFIED BACK PAIN LATERALITY: ICD-10-CM

## 2019-04-01 DIAGNOSIS — G89.29 CHRONIC BACK PAIN, UNSPECIFIED BACK LOCATION, UNSPECIFIED BACK PAIN LATERALITY: ICD-10-CM

## 2019-07-22 ENCOUNTER — OFFICE VISIT (OUTPATIENT)
Dept: MEDICAL GROUP | Facility: MEDICAL CENTER | Age: 64
End: 2019-07-22
Payer: MEDICARE

## 2019-07-22 VITALS
SYSTOLIC BLOOD PRESSURE: 116 MMHG | HEART RATE: 90 BPM | DIASTOLIC BLOOD PRESSURE: 72 MMHG | WEIGHT: 193 LBS | TEMPERATURE: 96.3 F | OXYGEN SATURATION: 95 % | HEIGHT: 66 IN | BODY MASS INDEX: 31.02 KG/M2

## 2019-07-22 DIAGNOSIS — G89.29 CHRONIC BACK PAIN, UNSPECIFIED BACK LOCATION, UNSPECIFIED BACK PAIN LATERALITY: ICD-10-CM

## 2019-07-22 DIAGNOSIS — M54.9 CHRONIC BACK PAIN, UNSPECIFIED BACK LOCATION, UNSPECIFIED BACK PAIN LATERALITY: ICD-10-CM

## 2019-07-22 DIAGNOSIS — J02.9 SORE THROAT: ICD-10-CM

## 2019-07-22 DIAGNOSIS — M79.671 RIGHT FOOT PAIN: ICD-10-CM

## 2019-07-22 DIAGNOSIS — J01.00 ACUTE NON-RECURRENT MAXILLARY SINUSITIS: ICD-10-CM

## 2019-07-22 LAB
INT CON NEG: NEGATIVE
INT CON POS: POSITIVE
S PYO AG THROAT QL: NEGATIVE

## 2019-07-22 PROCEDURE — 87880 STREP A ASSAY W/OPTIC: CPT | Performed by: FAMILY MEDICINE

## 2019-07-22 PROCEDURE — 99214 OFFICE O/P EST MOD 30 MIN: CPT | Performed by: FAMILY MEDICINE

## 2019-07-22 RX ORDER — AMOXICILLIN 875 MG/1
875 TABLET, COATED ORAL 2 TIMES DAILY
Qty: 20 TAB | Refills: 0 | Status: SHIPPED | OUTPATIENT
Start: 2019-07-22 | End: 2019-08-01

## 2019-07-22 RX ORDER — FLUTICASONE PROPIONATE 50 MCG
1 SPRAY, SUSPENSION (ML) NASAL 2 TIMES DAILY
Qty: 1 BOTTLE | Refills: 5 | Status: SHIPPED | OUTPATIENT
Start: 2019-07-22 | End: 2019-09-05

## 2019-07-22 RX ORDER — ESTRADIOL 0.1 MG/G
CREAM VAGINAL PRN
Status: ON HOLD | COMMUNITY
End: 2019-09-09

## 2019-07-22 NOTE — PROGRESS NOTES
"Chief Complaint   Patient presents with   • Pharyngitis     painful swallowing, x 3 days   • Otalgia     left ear; ,x 3 days        HPI: 3 days of illness including: sore throat, clearing throat, hoarse voice, left ear pain Mucus is: thin  Symptoms negative for fever, nasal congestion, rhinorrhea, facial pain, cough, wheezing, vomiting, diarrhea   Similarly ill exposures: no  Medical history negative for recurrent OM, tonsillitis, sinusitis, asthma, bronchitis, pneumonia.   She  reports that she quit smoking about 40 years ago. Her smoking use included Cigarettes. She has a 3.00 pack-year smoking history. She has never used smokeless tobacco.  Took Nyquil, which helped her sleep.      ROS  No fever. No GERD.    /72 (BP Location: Right arm, Patient Position: Sitting)   Pulse 90   Temp (!) 35.7 °C (96.3 °F)   Ht 1.676 m (5' 6\")   Wt 87.5 kg (193 lb)   SpO2 95%   Gen: alert, No conversational dyspnea. No audible wheeze, nontoxic.  PERRL, conjunctiva slightly injected. No photophobia. No eye discharge.  Ears: normal pinnae. TM: normal  Nares patent with thin mucus.  Sinuses tender over maxillary / frontal sinuses  Throat: erythematous injection. No exudate.   Neck: supple, with  no adenopathy  Lungs:  clear to auscultation, no wheezing, no retraction, no stridor, good air exchange.   Abdomen soft. No HSM.   Skin: warm and dry. No rash.    A/P    1. Acute non-recurrent maxillary sinusitis  New problem. Prescription Amoxil and Flonase.  - amoxicillin (AMOXIL) 875 MG tablet; Take 1 Tab by mouth 2 times a day for 10 days.  Dispense: 20 Tab; Refill: 0  - fluticasone (FLONASE) 50 MCG/ACT nasal spray; Spray 1 Spray in nose 2 times a day.  Dispense: 1 Bottle; Refill: 5    2. Sore throat  - POCT Rapid Strep A    3. Chronic back pain, unspecified back location, unspecified back pain laterality  Stable. Refill Voltaren gel.  - Diclofenac Sodium (VOLTAREN) 1 % Gel; Apply 2 g to affected area(s) 2 times a day as needed " (back pain).  Dispense: 100 g; Refill: 5    4. Right foot pain  Lump on plantar surface that is painful. Referral to podiatry.  - REFERRAL TO PODIATRY        Followup for worsening symptoms, difficulty breathing, lack of expected recovery, or should new symptoms or problems arise.

## 2019-07-24 ENCOUNTER — TELEPHONE (OUTPATIENT)
Dept: MEDICAL GROUP | Facility: MEDICAL CENTER | Age: 64
End: 2019-07-24

## 2019-07-24 DIAGNOSIS — M19.172 POST-TRAUMATIC ARTHRITIS OF LEFT ANKLE: ICD-10-CM

## 2019-07-24 DIAGNOSIS — Z98.890 PREVIOUS BACK SURGERY: ICD-10-CM

## 2019-07-24 NOTE — TELEPHONE ENCOUNTER
VOICEMAIL  1. Caller Name: Uzma                      Call Back Number: [phone]    2. Message: Patient has appointment for PT with Az Chappell MD. Patient requesting referral be sent to his office as she has an appointment with him tomorrow.    3. Patient approves office to leave a detailed voicemail/MyChart message: N\A

## 2019-07-25 ENCOUNTER — TELEPHONE (OUTPATIENT)
Dept: MEDICAL GROUP | Facility: MEDICAL CENTER | Age: 64
End: 2019-07-25

## 2019-07-25 RX ORDER — METHYLPREDNISOLONE 4 MG/1
TABLET ORAL
Qty: 21 TAB | Refills: 0 | Status: SHIPPED | OUTPATIENT
Start: 2019-07-25 | End: 2019-09-05

## 2019-07-25 NOTE — TELEPHONE ENCOUNTER
Patient is requesting rx for Medrol to relieve shoulder inflammation. Rx was recommended by PTAz.

## 2019-07-25 NOTE — TELEPHONE ENCOUNTER
----- Message from Wendy Meliton sent at 7/25/2019  9:34 AM PDT -----  Regarding: RX  Contact: [phone]  Patient would like to get an RX for Medrol for her shoulder   [facility]acy: [facility]  She said Az Chappell PT (Ph. [phone]) recommended she take this for the inflamation.

## 2019-07-29 ENCOUNTER — TELEPHONE (OUTPATIENT)
Dept: MEDICAL GROUP | Facility: MEDICAL CENTER | Age: 64
End: 2019-07-29

## 2019-07-29 NOTE — TELEPHONE ENCOUNTER
Physical Therapy reported a possible entrapment in structure of vertebrae which causes her excruciating pain. Az Chappell was concerned that exercises only increased her pain, instead of relieving some.

## 2019-07-29 NOTE — TELEPHONE ENCOUNTER
Please have the patient schedule an appointment so we can go over symptoms and document reasoning for MRI.  Nabil Cifuentes M.D.

## 2019-07-29 NOTE — TELEPHONE ENCOUNTER
----- Message from Wendy Coelho sent at 7/29/2019  9:53 AM PDT -----  Regarding: MRI  Contact: [phone]  Patient states that PT recommended she get an Cervicle MRI done

## 2019-07-29 NOTE — TELEPHONE ENCOUNTER
Please ask physical therapy for the reasoning behind the MRI order, we need to put a reason when ordering the MRI.  Nabil Cifuentes M.D.

## 2019-07-30 ENCOUNTER — OFFICE VISIT (OUTPATIENT)
Dept: MEDICAL GROUP | Facility: MEDICAL CENTER | Age: 64
End: 2019-07-30
Payer: MEDICARE

## 2019-07-30 VITALS
HEIGHT: 66 IN | OXYGEN SATURATION: 94 % | DIASTOLIC BLOOD PRESSURE: 76 MMHG | WEIGHT: 193 LBS | BODY MASS INDEX: 31.02 KG/M2 | HEART RATE: 89 BPM | TEMPERATURE: 97.5 F | SYSTOLIC BLOOD PRESSURE: 116 MMHG

## 2019-07-30 DIAGNOSIS — M54.2 NECK PAIN ON RIGHT SIDE: ICD-10-CM

## 2019-07-30 PROCEDURE — 99214 OFFICE O/P EST MOD 30 MIN: CPT | Performed by: FAMILY MEDICINE

## 2019-07-30 RX ORDER — TIZANIDINE 4 MG/1
4 TABLET ORAL
Qty: 30 TAB | Refills: 1 | Status: SHIPPED | OUTPATIENT
Start: 2019-07-30 | End: 2019-09-05

## 2019-07-30 RX ORDER — MELOXICAM 15 MG/1
15 TABLET ORAL DAILY
Qty: 30 TAB | Refills: 1 | Status: SHIPPED | OUTPATIENT
Start: 2019-07-30 | End: 2019-09-05

## 2019-07-30 NOTE — ASSESSMENT & PLAN NOTE
Has been having left sided neck pain, 2 weeks, no known cause. Pain is radiating down her arm and making her 2nd and 3rd finger numbness.  The prednisone and resting have helped. Physical therapy recommended MRI for possible nerve entrapment.  Had herniated disc and surgical repair about 20 years ago and 10 years ago she required an injection in cervical pain.

## 2019-07-30 NOTE — PROGRESS NOTES
Subjective:   Uzma Gamez is a 64 y.o. female here today for neck pain    Neck pain on right side  Has been having left sided neck pain, 2 weeks, no known cause. Pain is radiating down her arm and making her 2nd and 3rd finger numbness.  The prednisone and resting have helped. Physical therapy recommended MRI for possible nerve entrapment.  Had herniated disc and surgical repair about 20 years ago and 10 years ago she required an injection in cervical pain.     Patient did do one session of physical therapy, which was stopped prematurely because the physical therapist was concerned about possible nerve impingement and did not feel comfortable proceeding with treatment without an MRI.    Current medicines (including changes today)  Current Outpatient Prescriptions   Medication Sig Dispense Refill   • tizanidine (ZANAFLEX) 4 MG Tab Take 1 Tab by mouth at bedtime as needed (back pain). 30 Tab 1   • meloxicam (MOBIC) 15 MG tablet Take 1 Tab by mouth every day. 30 Tab 1   • methylPREDNISolone (MEDROL DOSEPAK) 4 MG Tablet Therapy Pack As directed on the packaging label. 21 Tab 0   • estradiol (ESTRACE VAGINAL) 0.1 MG/GM vaginal cream Insert  in vagina every day.     • amoxicillin (AMOXIL) 875 MG tablet Take 1 Tab by mouth 2 times a day for 10 days. 20 Tab 0   • fluticasone (FLONASE) 50 MCG/ACT nasal spray Spray 1 Spray in nose 2 times a day. 1 Bottle 5   • Diclofenac Sodium (VOLTAREN) 1 % Gel Apply 2 g to affected area(s) 2 times a day as needed (back pain). 100 g 5   • atorvastatin (LIPITOR) 20 MG Tab Take 1 Tab by mouth every bedtime. 90 Tab 3   • gabapentin (NEURONTIN) 300 MG Cap Take 1 Cap by mouth 3 times a day. 270 Cap 3   • Zinc 50 MG Tab Take  by mouth.     • Melatonin 5 MG Cap Take  by mouth.     • Risedronate Sodium (ACTONEL) 150 MG Tab Take 1 Tab by mouth Q30 DAYS. 3 Tab 3   • Polyvinyl Alcohol-Povidone (REFRESH OP) Place 1 Drop in both eyes every day.     • SUPER B COMPLEX/C PO Take 1 Tab by mouth every  "day.     • Biotin 1000 MCG Tab Take 500 mcg by mouth every day.     • aspirin (ASA) 81 MG Chew Tab chewable tablet Take 81 mg by mouth every day.     • Omega-3 Fatty Acids (OMEGA 3 PO) Take 1,500 mg by mouth every day.     • Calcium Carbonate-Vitamin D 600-400 MG-UNIT Tab Take 1 Tab by mouth 2 Times a Day.     • vitamin D (CHOLECALCIFEROL) 1000 UNIT Tab Take 2,000 Units by mouth every day.       No current facility-administered medications for this visit.      She  has a past medical history of Anesthesia; Arthritis; Bilateral cataracts; Fall at home, initial encounter (1/11/2019); H/O: hysterectomy (6/5/2009); High cholesterol; Plantar fasciitis (6/5/2009); and Previous back surgery (6/5/2009).    ROS   No fever, positive right sided radiculopathy in upper extremity       Objective:     /76 (BP Location: Right arm, Patient Position: Sitting)   Pulse 89   Temp 36.4 °C (97.5 °F)   Ht 1.676 m (5' 6\")   Wt 87.5 kg (193 lb)   SpO2 94%  Body mass index is 31.15 kg/m².   Physical Exam:  Constitutional: Alert, no distress.  Skin: Warm, dry, good turgor, no rashes in visible areas.  Eye: Equal, round and reactive, conjunctiva clear, lids normal.  Psych: Alert and oriented x3, normal affect and mood.  MSK: Muscular edema and tenderness of mid right trapezius muscle with restricted range of movement of neck to the right.  No cervical spinal tenderness.  Decreased sensation in right 2nd and 3rd fingers.      Assessment and Plan:   The following treatment plan was discussed    1. Neck pain on right side  Possibly related to muscular edema and inflammation.  Trial of tizanidine, rest, meloxicam.  If no improvement in 1 week consider MRI of cervical spine and most likely physiatry referral.  - tizanidine (ZANAFLEX) 4 MG Tab; Take 1 Tab by mouth at bedtime as needed (back pain).  Dispense: 30 Tab; Refill: 1  - MR-CERVICAL SPINE-W/O; Future  - meloxicam (MOBIC) 15 MG tablet; Take 1 Tab by mouth every day.  Dispense: 30 " Tab; Refill: 1      Followup: Return if symptoms worsen or fail to improve.

## 2019-08-01 ENCOUNTER — PATIENT MESSAGE (OUTPATIENT)
Dept: MEDICAL GROUP | Facility: MEDICAL CENTER | Age: 64
End: 2019-08-01

## 2019-08-01 DIAGNOSIS — G89.29 CHRONIC BACK PAIN, UNSPECIFIED BACK LOCATION, UNSPECIFIED BACK PAIN LATERALITY: ICD-10-CM

## 2019-08-01 DIAGNOSIS — M54.9 CHRONIC BACK PAIN, UNSPECIFIED BACK LOCATION, UNSPECIFIED BACK PAIN LATERALITY: ICD-10-CM

## 2019-08-01 DIAGNOSIS — E78.00 PURE HYPERCHOLESTEROLEMIA: Chronic | ICD-10-CM

## 2019-08-01 RX ORDER — ATORVASTATIN CALCIUM 20 MG/1
20 TABLET, FILM COATED ORAL
Qty: 90 TAB | Refills: 3 | Status: SHIPPED | OUTPATIENT
Start: 2019-08-01 | End: 2020-03-02

## 2019-08-01 NOTE — TELEPHONE ENCOUNTER
From: Uzma Gamez  To: Nabil Cifuentes M.D.  Sent: 8/1/2019 11:40 AM PDT  Subject: Prescription Question    My prescription for atorvastatin 20 MG Tabs is no longer valid, I have changed medical plans. Please provide a new prescription to the new provider:    Mike ([facility])  [phone]  Claims Processing  [address] [address]  [place] [address]    Thank you

## 2019-08-08 ENCOUNTER — PATIENT MESSAGE (OUTPATIENT)
Dept: MEDICAL GROUP | Facility: MEDICAL CENTER | Age: 64
End: 2019-08-08

## 2019-08-08 ENCOUNTER — APPOINTMENT (OUTPATIENT)
Dept: RADIOLOGY | Facility: MEDICAL CENTER | Age: 64
End: 2019-08-08
Attending: FAMILY MEDICINE
Payer: MEDICARE

## 2019-08-08 DIAGNOSIS — M54.2 NECK PAIN ON RIGHT SIDE: ICD-10-CM

## 2019-08-08 PROCEDURE — 72141 MRI NECK SPINE W/O DYE: CPT

## 2019-08-09 NOTE — TELEPHONE ENCOUNTER
From: Uzma Gamez  To: Nabil Cifuentes M.D.  Sent: 8/8/2019 4:10 PM PDT  Subject: Test Result Question    Dr. Cifuentes:    I received your comments regarding the MRI, please send a referral to the pain specialist.     Should I go back for physical therapy with [facility]?     What is the date of the previous MRI? Was it preformed at [facility]?    Uzma Gamez

## 2019-09-05 DIAGNOSIS — Z01.810 PRE-OPERATIVE CARDIOVASCULAR EXAMINATION: ICD-10-CM

## 2019-09-05 LAB — EKG IMPRESSION: NORMAL

## 2019-09-05 PROCEDURE — 93010 ELECTROCARDIOGRAM REPORT: CPT | Performed by: INTERNAL MEDICINE

## 2019-09-05 PROCEDURE — 93005 ELECTROCARDIOGRAM TRACING: CPT | Performed by: ORTHOPAEDIC SURGERY

## 2019-09-05 RX ORDER — FLUTICASONE PROPIONATE 50 MCG
1 SPRAY, SUSPENSION (ML) NASAL EVERY MORNING
COMMUNITY
End: 2020-03-02

## 2019-09-05 NOTE — DISCHARGE PLANNING
DISCHARGE PLANNING NOTE      Procedure: Procedure(s):  EXCISION, BONE SPUR- MEDIAL MALLEOLAR  ORIF, ANKLE- DELTOID RECONSTRUCTION AND PLACEMENT OF MEDIAL MALLEOLAR SCREW  Procedure Date: 9/9/2019  Insurance:  Payor: MEDICARE / Plan: MEDICARE PART A & B  Equipment currently available at home? crutches, front-wheel walker and kneeling scooter    Plan: Patient has been [facility] in the past and has all needed equipment. There are no identified discharge needs. Anticipate discharge home.

## 2019-09-09 ENCOUNTER — ANESTHESIA EVENT (OUTPATIENT)
Dept: SURGERY | Facility: MEDICAL CENTER | Age: 64
End: 2019-09-09
Payer: MEDICARE

## 2019-09-09 ENCOUNTER — HOSPITAL ENCOUNTER (OUTPATIENT)
Facility: MEDICAL CENTER | Age: 64
End: 2019-09-09
Attending: ORTHOPAEDIC SURGERY | Admitting: ORTHOPAEDIC SURGERY
Payer: MEDICARE

## 2019-09-09 ENCOUNTER — APPOINTMENT (OUTPATIENT)
Dept: RADIOLOGY | Facility: MEDICAL CENTER | Age: 64
End: 2019-09-09
Attending: ORTHOPAEDIC SURGERY
Payer: MEDICARE

## 2019-09-09 ENCOUNTER — ANESTHESIA (OUTPATIENT)
Dept: SURGERY | Facility: MEDICAL CENTER | Age: 64
End: 2019-09-09
Payer: MEDICARE

## 2019-09-09 VITALS
TEMPERATURE: 98.5 F | HEIGHT: 66 IN | RESPIRATION RATE: 12 BRPM | OXYGEN SATURATION: 93 % | SYSTOLIC BLOOD PRESSURE: 129 MMHG | DIASTOLIC BLOOD PRESSURE: 57 MMHG | WEIGHT: 192.02 LBS | BODY MASS INDEX: 30.86 KG/M2 | HEART RATE: 85 BPM

## 2019-09-09 PROCEDURE — 87070 CULTURE OTHR SPECIMN AEROBIC: CPT

## 2019-09-09 PROCEDURE — 160009 HCHG ANES TIME/MIN: Performed by: ORTHOPAEDIC SURGERY

## 2019-09-09 PROCEDURE — 502240 HCHG MISC OR SUPPLY RC 0272: Performed by: ORTHOPAEDIC SURGERY

## 2019-09-09 PROCEDURE — 700111 HCHG RX REV CODE 636 W/ 250 OVERRIDE (IP)

## 2019-09-09 PROCEDURE — 700111 HCHG RX REV CODE 636 W/ 250 OVERRIDE (IP): Performed by: ANESTHESIOLOGY

## 2019-09-09 PROCEDURE — 160002 HCHG RECOVERY MINUTES (STAT): Performed by: ORTHOPAEDIC SURGERY

## 2019-09-09 PROCEDURE — 500423 HCHG DRESSING, ABD COMBINE: Performed by: ORTHOPAEDIC SURGERY

## 2019-09-09 PROCEDURE — 160022 HCHG BLOCK: Performed by: ORTHOPAEDIC SURGERY

## 2019-09-09 PROCEDURE — 160041 HCHG SURGERY MINUTES - EA ADDL 1 MIN LEVEL 4: Performed by: ORTHOPAEDIC SURGERY

## 2019-09-09 PROCEDURE — 700101 HCHG RX REV CODE 250: Performed by: ORTHOPAEDIC SURGERY

## 2019-09-09 PROCEDURE — 160035 HCHG PACU - 1ST 60 MINS PHASE I: Performed by: ORTHOPAEDIC SURGERY

## 2019-09-09 PROCEDURE — 87206 SMEAR FLUORESCENT/ACID STAI: CPT

## 2019-09-09 PROCEDURE — 700105 HCHG RX REV CODE 258: Performed by: ORTHOPAEDIC SURGERY

## 2019-09-09 PROCEDURE — 500881 HCHG PACK, EXTREMITY: Performed by: ORTHOPAEDIC SURGERY

## 2019-09-09 PROCEDURE — 160048 HCHG OR STATISTICAL LEVEL 1-5: Performed by: ORTHOPAEDIC SURGERY

## 2019-09-09 PROCEDURE — 87075 CULTR BACTERIA EXCEPT BLOOD: CPT

## 2019-09-09 PROCEDURE — 160029 HCHG SURGERY MINUTES - 1ST 30 MINS LEVEL 4: Performed by: ORTHOPAEDIC SURGERY

## 2019-09-09 PROCEDURE — A6454 SELF-ADHER BAND W>=3" <5"/YD: HCPCS | Performed by: ORTHOPAEDIC SURGERY

## 2019-09-09 PROCEDURE — 700102 HCHG RX REV CODE 250 W/ 637 OVERRIDE(OP): Performed by: ANESTHESIOLOGY

## 2019-09-09 PROCEDURE — 87116 MYCOBACTERIA CULTURE: CPT

## 2019-09-09 PROCEDURE — 160036 HCHG PACU - EA ADDL 30 MINS PHASE I: Performed by: ORTHOPAEDIC SURGERY

## 2019-09-09 PROCEDURE — C1713 ANCHOR/SCREW BN/BN,TIS/BN: HCPCS | Performed by: ORTHOPAEDIC SURGERY

## 2019-09-09 PROCEDURE — 160046 HCHG PACU - 1ST 60 MINS PHASE II: Performed by: ORTHOPAEDIC SURGERY

## 2019-09-09 PROCEDURE — 700101 HCHG RX REV CODE 250: Performed by: ANESTHESIOLOGY

## 2019-09-09 PROCEDURE — 502000 HCHG MISC OR IMPLANTS RC 0278: Performed by: ORTHOPAEDIC SURGERY

## 2019-09-09 PROCEDURE — A9270 NON-COVERED ITEM OR SERVICE: HCPCS | Performed by: ANESTHESIOLOGY

## 2019-09-09 PROCEDURE — 501838 HCHG SUTURE GENERAL: Performed by: ORTHOPAEDIC SURGERY

## 2019-09-09 PROCEDURE — A6223 GAUZE >16<=48 NO W/SAL W/O B: HCPCS | Performed by: ORTHOPAEDIC SURGERY

## 2019-09-09 PROCEDURE — 87102 FUNGUS ISOLATION CULTURE: CPT

## 2019-09-09 PROCEDURE — 87205 SMEAR GRAM STAIN: CPT

## 2019-09-09 PROCEDURE — 87015 SPECIMEN INFECT AGNT CONCNTJ: CPT

## 2019-09-09 PROCEDURE — 73600 X-RAY EXAM OF ANKLE: CPT | Mod: LT

## 2019-09-09 PROCEDURE — 160025 RECOVERY II MINUTES (STATS): Performed by: ORTHOPAEDIC SURGERY

## 2019-09-09 DEVICE — SUTURE ANCHOR TWINFIX 3.5 WITH NEEDLES SMALL JOINT: Type: IMPLANTABLE DEVICE | Site: ANKLE | Status: FUNCTIONAL

## 2019-09-09 DEVICE — IMPLANTABLE DEVICE: Type: IMPLANTABLE DEVICE | Site: ANKLE | Status: FUNCTIONAL

## 2019-09-09 RX ORDER — CELECOXIB 200 MG/1
200 CAPSULE ORAL ONCE
Status: DISCONTINUED | OUTPATIENT
Start: 2019-09-09 | End: 2019-09-09 | Stop reason: HOSPADM

## 2019-09-09 RX ORDER — HYDROMORPHONE HYDROCHLORIDE 1 MG/ML
INJECTION, SOLUTION INTRAMUSCULAR; INTRAVENOUS; SUBCUTANEOUS
Status: COMPLETED
Start: 2019-09-09 | End: 2019-09-09

## 2019-09-09 RX ORDER — HALOPERIDOL 5 MG/ML
1 INJECTION INTRAMUSCULAR
Status: DISCONTINUED | OUTPATIENT
Start: 2019-09-09 | End: 2019-09-09 | Stop reason: HOSPADM

## 2019-09-09 RX ORDER — BUPIVACAINE HYDROCHLORIDE AND EPINEPHRINE 2.5; 5 MG/ML; UG/ML
INJECTION, SOLUTION EPIDURAL; INFILTRATION; INTRACAUDAL; PERINEURAL
Status: COMPLETED | OUTPATIENT
Start: 2019-09-09 | End: 2019-09-09

## 2019-09-09 RX ORDER — ACETAMINOPHEN 500 MG
1000 TABLET ORAL ONCE
Status: DISCONTINUED | OUTPATIENT
Start: 2019-09-09 | End: 2019-09-09 | Stop reason: HOSPADM

## 2019-09-09 RX ORDER — HYDROMORPHONE HYDROCHLORIDE 1 MG/ML
0.2 INJECTION, SOLUTION INTRAMUSCULAR; INTRAVENOUS; SUBCUTANEOUS
Status: DISCONTINUED | OUTPATIENT
Start: 2019-09-09 | End: 2019-09-09 | Stop reason: HOSPADM

## 2019-09-09 RX ORDER — BUPIVACAINE HYDROCHLORIDE 5 MG/ML
INJECTION, SOLUTION EPIDURAL; INTRACAUDAL PRN
Status: DISCONTINUED | OUTPATIENT
Start: 2019-09-09 | End: 2019-09-09 | Stop reason: SURG

## 2019-09-09 RX ORDER — METOPROLOL TARTRATE 1 MG/ML
1 INJECTION, SOLUTION INTRAVENOUS
Status: DISCONTINUED | OUTPATIENT
Start: 2019-09-09 | End: 2019-09-09 | Stop reason: HOSPADM

## 2019-09-09 RX ORDER — OXYCODONE HCL 5 MG/5 ML
10 SOLUTION, ORAL ORAL
Status: COMPLETED | OUTPATIENT
Start: 2019-09-09 | End: 2019-09-09

## 2019-09-09 RX ORDER — OXYCODONE HCL 5 MG/5 ML
5 SOLUTION, ORAL ORAL
Status: COMPLETED | OUTPATIENT
Start: 2019-09-09 | End: 2019-09-09

## 2019-09-09 RX ORDER — ONDANSETRON 2 MG/ML
4 INJECTION INTRAMUSCULAR; INTRAVENOUS
Status: DISCONTINUED | OUTPATIENT
Start: 2019-09-09 | End: 2019-09-09 | Stop reason: HOSPADM

## 2019-09-09 RX ORDER — HYDROMORPHONE HYDROCHLORIDE 1 MG/ML
0.1 INJECTION, SOLUTION INTRAMUSCULAR; INTRAVENOUS; SUBCUTANEOUS
Status: DISCONTINUED | OUTPATIENT
Start: 2019-09-09 | End: 2019-09-09 | Stop reason: HOSPADM

## 2019-09-09 RX ORDER — BUPIVACAINE HYDROCHLORIDE 5 MG/ML
INJECTION, SOLUTION EPIDURAL; INTRACAUDAL
Status: COMPLETED | OUTPATIENT
Start: 2019-09-09 | End: 2019-09-09

## 2019-09-09 RX ORDER — DEXAMETHASONE SODIUM PHOSPHATE 4 MG/ML
INJECTION, SOLUTION INTRA-ARTICULAR; INTRALESIONAL; INTRAMUSCULAR; INTRAVENOUS; SOFT TISSUE PRN
Status: DISCONTINUED | OUTPATIENT
Start: 2019-09-09 | End: 2019-09-09 | Stop reason: SURG

## 2019-09-09 RX ORDER — SODIUM CHLORIDE, SODIUM LACTATE, POTASSIUM CHLORIDE, CALCIUM CHLORIDE 600; 310; 30; 20 MG/100ML; MG/100ML; MG/100ML; MG/100ML
INJECTION, SOLUTION INTRAVENOUS CONTINUOUS
Status: DISCONTINUED | OUTPATIENT
Start: 2019-09-09 | End: 2019-09-09 | Stop reason: HOSPADM

## 2019-09-09 RX ORDER — HYDROMORPHONE HYDROCHLORIDE 1 MG/ML
0.4 INJECTION, SOLUTION INTRAMUSCULAR; INTRAVENOUS; SUBCUTANEOUS
Status: DISCONTINUED | OUTPATIENT
Start: 2019-09-09 | End: 2019-09-09 | Stop reason: HOSPADM

## 2019-09-09 RX ORDER — MAGNESIUM HYDROXIDE 1200 MG/15ML
LIQUID ORAL
Status: COMPLETED | OUTPATIENT
Start: 2019-09-09 | End: 2019-09-09

## 2019-09-09 RX ORDER — ONDANSETRON 2 MG/ML
4 INJECTION INTRAMUSCULAR; INTRAVENOUS
Status: COMPLETED | OUTPATIENT
Start: 2019-09-09 | End: 2019-09-09

## 2019-09-09 RX ORDER — DIPHENHYDRAMINE HYDROCHLORIDE 50 MG/ML
12.5 INJECTION INTRAMUSCULAR; INTRAVENOUS
Status: DISCONTINUED | OUTPATIENT
Start: 2019-09-09 | End: 2019-09-09 | Stop reason: HOSPADM

## 2019-09-09 RX ORDER — HYDRALAZINE HYDROCHLORIDE 20 MG/ML
5 INJECTION INTRAMUSCULAR; INTRAVENOUS
Status: DISCONTINUED | OUTPATIENT
Start: 2019-09-09 | End: 2019-09-09 | Stop reason: HOSPADM

## 2019-09-09 RX ORDER — CEFAZOLIN SODIUM 1 G/3ML
INJECTION, POWDER, FOR SOLUTION INTRAMUSCULAR; INTRAVENOUS PRN
Status: DISCONTINUED | OUTPATIENT
Start: 2019-09-09 | End: 2019-09-09 | Stop reason: SURG

## 2019-09-09 RX ORDER — ONDANSETRON 2 MG/ML
INJECTION INTRAMUSCULAR; INTRAVENOUS PRN
Status: DISCONTINUED | OUTPATIENT
Start: 2019-09-09 | End: 2019-09-09 | Stop reason: SURG

## 2019-09-09 RX ORDER — GABAPENTIN 300 MG/1
300 CAPSULE ORAL ONCE
Status: DISCONTINUED | OUTPATIENT
Start: 2019-09-09 | End: 2019-09-09 | Stop reason: HOSPADM

## 2019-09-09 RX ADMIN — HYDROMORPHONE HYDROCHLORIDE 0.4 MG: 1 INJECTION, SOLUTION INTRAMUSCULAR; INTRAVENOUS; SUBCUTANEOUS at 17:10

## 2019-09-09 RX ADMIN — FENTANYL CITRATE 100 MCG: 50 INJECTION, SOLUTION INTRAMUSCULAR; INTRAVENOUS at 15:17

## 2019-09-09 RX ADMIN — OXYCODONE HYDROCHLORIDE 10 MG: 5 SOLUTION ORAL at 16:31

## 2019-09-09 RX ADMIN — CEFAZOLIN 2 G: 330 INJECTION, POWDER, FOR SOLUTION INTRAMUSCULAR; INTRAVENOUS at 15:14

## 2019-09-09 RX ADMIN — DEXAMETHASONE SODIUM PHOSPHATE 4 MG: 4 INJECTION, SOLUTION INTRA-ARTICULAR; INTRALESIONAL; INTRAMUSCULAR; INTRAVENOUS; SOFT TISSUE at 15:33

## 2019-09-09 RX ADMIN — FENTANYL CITRATE 100 MCG: 50 INJECTION, SOLUTION INTRAMUSCULAR; INTRAVENOUS at 15:52

## 2019-09-09 RX ADMIN — HALOPERIDOL LACTATE 1 MG: 5 INJECTION, SOLUTION INTRAMUSCULAR at 16:45

## 2019-09-09 RX ADMIN — HYDROMORPHONE HYDROCHLORIDE 0.4 MG: 1 INJECTION, SOLUTION INTRAMUSCULAR; INTRAVENOUS; SUBCUTANEOUS at 16:40

## 2019-09-09 RX ADMIN — BUPIVACAINE HYDROCHLORIDE AND EPINEPHRINE 5 ML: 2.5; 5 INJECTION, SOLUTION EPIDURAL; INFILTRATION; INTRACAUDAL; PERINEURAL at 15:20

## 2019-09-09 RX ADMIN — SODIUM CHLORIDE, POTASSIUM CHLORIDE, SODIUM LACTATE AND CALCIUM CHLORIDE: 600; 310; 30; 20 INJECTION, SOLUTION INTRAVENOUS at 12:27

## 2019-09-09 RX ADMIN — PROPOFOL 200 MG: 10 INJECTION, EMULSION INTRAVENOUS at 15:17

## 2019-09-09 RX ADMIN — ONDANSETRON 4 MG: 2 INJECTION INTRAMUSCULAR; INTRAVENOUS at 16:38

## 2019-09-09 RX ADMIN — FENTANYL CITRATE 50 MCG: 50 INJECTION, SOLUTION INTRAMUSCULAR; INTRAVENOUS at 16:45

## 2019-09-09 RX ADMIN — BUPIVACAINE HYDROCHLORIDE 10 ML: 5 INJECTION, SOLUTION EPIDURAL; INTRACAUDAL; PERINEURAL at 15:20

## 2019-09-09 RX ADMIN — ONDANSETRON 4 MG: 2 INJECTION INTRAMUSCULAR; INTRAVENOUS at 15:33

## 2019-09-09 RX ADMIN — FENTANYL CITRATE 50 MCG: 50 INJECTION, SOLUTION INTRAMUSCULAR; INTRAVENOUS at 16:50

## 2019-09-09 ASSESSMENT — PAIN SCALES - GENERAL: PAIN_LEVEL: 0

## 2019-09-09 NOTE — ANESTHESIA PROCEDURE NOTES
Airway  Date/Time: 9/9/2019 3:17 PM  Performed by: Wang Terry M.D.  Authorized by: Wang Terry M.D.     Location:  OR  Urgency:  Elective  Indications for Airway Management:  Anesthesia  Spontaneous Ventilation: absent    Sedation Level:  Deep  Preoxygenated: Yes    Final Airway Type:  Supraglottic airway  Final Supraglottic Airway:  Standard LMA  SGA Size:  3  Number of Attempts at Approach:  1

## 2019-09-09 NOTE — OR NURSING
Patient A+Ox4. C/o 10/10 pain to left ankle.   Leg elevated on pillows x2.  Cap refill immediate.  Dressing clean and dry.  Moving all exts bilat equal.  Dr Terry at bedside to reassess.  Patient received adductor canal block.  To give iv meds as ordered.  Cont to monitor

## 2019-09-09 NOTE — ANESTHESIA QCDR
2019 Qualified Clinical Data Registry (for Quality Improvement)     Postoperative nausea/vomiting risk protocol (Adult = 18 yrs and Pediatric 3-17 yrs)- (430 and 463)  General inhalation anesthetic (NOT TIVA) with PONV risk factors: Yes  Provision of anti-emetic therapy with at least 2 different classes of agents: Yes   Patient DID NOT receive anti-emetic therapy and reason is documented in Medical Record:  N/A    Multimodal Pain Management- (AQI59)  Patient undergoing Elective Surgery (i.e. Outpatient, or ASC, or Prescheduled Surgery prior to Hospital Admission): Yes  Use of Multimodal Pain Management, two or more drugs and/or interventions, NOT including systemic opioids: Yes   Exception: Documented allergy to multiple classes of analgesics:  N/A    PACU assessment of acute postoperative pain prior to Anesthesia Care End- Applies to Patients Age = 18- (ABG7)  Initial PACU pain score is which of the following: < 7/10  Patient unable to report pain score: N/A    Post-anesthetic transfer of care checklist/protocol to PACU/ICU- (426 and 427)  Upon conclusion of case, patient transferred to which of the following locations: PACU/Non-ICU  Use of transfer checklist/protocol: No  Exclusion: Service Performed in Patient Hospital Room (and thus did not require transfer):      PACU Reintubation- (AQI31)  General anesthesia requiring endotracheal intubation (ETT) along with subsequent extubation in OR or PACU: No  Required reintubation in the PACU: N/A  Extubation was a planned trial documented in the medical record prior to removal of the original airway device: N/A    Unplanned admission to ICU related to anesthesia service up through end of PACU care- (MD51)  Unplanned admission to ICU (not initially anticipated at anesthesia start time): No

## 2019-09-09 NOTE — ANESTHESIA PROCEDURE NOTES
Peripheral Block  Date/Time: 9/9/2019 3:20 PM  Performed by: Wang Terry M.D.  Authorized by: Wang Terry M.D.     Patient Location:  OR  Start Time:  9/9/2019 3:20 PM  End Time:  9/9/2019 3:22 PM  Reason for Block: at surgeon's request and post-op pain management    patient identified, IV checked, site marked, risks and benefits discussed, surgical consent, monitors and equipment checked, pre-op evaluation and timeout performed    Patient Position:  Supine  Prep: ChloraPrep    Monitoring:  Heart rate, cardiac monitor and continuous pulse ox  Block Region:  Lower Extremity  Lower Extremity - Block Type:  Selective FEMORAL nerve block at the Adductor Canal    Laterality:  Left  Procedures: ultrasound guided  Image captured, interpreted and electronically stored.    Block Type:  Single-shot  Needle Localization:  Ultrasound guidance  Injection Assessment:  Negative aspiration for heme, no paresthesia on injection and incremental injection  Evidence of intravascular injection: No     US Guided Selective Femoral Nerve Block at Adductor Canal:   US probe placed at mid-thigh level on externally rotated leg and femur identified.  Probe directed medially until Sartorius Muscle (SM), Femoral Artery (FA) and Saphenous Nerve (SN) identified in Adductor Canal (AC).  Needle inserted anterolateral to probe in an in plane approach into a subsartorial perivascular perineural position.  After negative aspiration LA injected with ease and visualized spreading within the AC.

## 2019-09-09 NOTE — ANESTHESIA POSTPROCEDURE EVALUATION
Patient: Uzma Gamez    Procedure Summary     Date:  09/09/19 Room / Location:  [place] [address] / SURGERY [facility]    Anesthesia Start:  1514 Anesthesia Stop:  1625    Procedures:       EXCISION, BONE SPUR- MEDIAL MALLEOLAR (Left Foot)      ORIF, ANKLE- DELTOID RECONSTRUCTION AND PLACEMENT OF MEDIAL MALLEOLAR SCREW (Left Ankle) Diagnosis:  (Left painful total ankle arthroplasty)    Surgeon:  Rao Sarah M.D. Responsible Provider:  Wang Terry M.D.    Anesthesia Type:  general, peripheral nerve block ASA Status:  2          Final Anesthesia Type: general, peripheral nerve block  Last vitals  BP   Blood Pressure : 115/57    Temp   36.5 °C (97.7 °F)    Pulse   Pulse: 79   Resp   16    SpO2   97 %      Anesthesia Post Evaluation    Patient location during evaluation: PACU  Patient participation: complete - patient participated  Level of consciousness: awake and alert  Pain score: 0    Airway patency: patent  Anesthetic complications: no  Cardiovascular status: hemodynamically stable  Respiratory status: acceptable  Hydration status: euvolemic    PONV: none           Nurse Pain Score: 0 (NPRS)

## 2019-09-09 NOTE — ANESTHESIA TIME REPORT
Anesthesia Start and Stop Event Times     Date Time Event    9/9/2019 1514 Anesthesia Start     1625 Anesthesia Stop        Responsible Staff  09/09/19    Name Role Begin End    Wang Terry M.D. Anesth 1514 0430        Preop Diagnosis (Free Text):  Pre-op Diagnosis     Left painful total ankle arthroplasty        Preop Diagnosis (Codes):    Post op Diagnosis  Post-traumatic osteoarthritis, left ankle and foot      Premium Reason  A. 3PM - 7AM    Comments:

## 2019-09-09 NOTE — ANESTHESIA PREPROCEDURE EVALUATION
Relevant Problems   NEURO   (+) S/P ORIF (open reduction internal fixation) fracture      CARDIAC   (+) CAD (coronary artery disease)       Physical Exam    Airway   Mallampati: II  TM distance: >3 FB  Neck ROM: full       Cardiovascular - normal exam  Rhythm: regular  Rate: normal  (-) murmur     Dental - normal exam         Pulmonary - normal exam  Breath sounds clear to auscultation     Abdominal    Neurological - normal exam                 Anesthesia Plan    ASA 2       Plan - general and peripheral nerve block     Peripheral nerve block will be post-op pain control  Airway plan will be LMA        Induction: intravenous    Postoperative Plan: Postoperative administration of opioids is intended.    Pertinent diagnostic labs and testing reviewed    Informed Consent:    Anesthetic plan and risks discussed with patient.    Use of blood products discussed with: patient whom consented to blood products.

## 2019-09-10 LAB
GRAM STN SPEC: NORMAL
RHODAMINE-AURAMINE STN SPEC: NORMAL
SIGNIFICANT IND 70042: NORMAL
SIGNIFICANT IND 70042: NORMAL
SITE SITE: NORMAL
SITE SITE: NORMAL
SOURCE SOURCE: NORMAL
SOURCE SOURCE: NORMAL

## 2019-09-10 NOTE — OP REPORT
DATE OF SERVICE:  09/09/2019    PREOPERATIVE DIAGNOSES:  1.  Status post left total ankle arthroplasty.  2.  Left medial ankle pain with impingement.  3.  Left insufficiency fracture, medial malleolus.    POSTOPERATIVE DIAGNOSES:  1.  Status post left total ankle arthroplasty.  2.  Left medial ankle pain with impingement.  3.  Left insufficiency fracture, medial malleolus.    PROCEDURE PERFORMED:  1.  Left ankle arthrotomy with biopsy.  2.  Left excision distal tibia spurs.  3.  Left medial malleolar placement of screws for impending stress fracture.  4.  Left deltoid reconstruction.    SURGEON:  Rao Sarah MD    FIRST ASSISTANT:  Luana Serna MD    SECOND ASSISTANT:  Nathalie Ricardo.    ANESTHESIA:  General endotracheal with regional block per my request for   postoperative pain management.    ESTIMATED BLOOD LOSS:  None.    COMPLICATIONS:  None.    POSTOPERATIVE PLAN:  1.  Weightbearing as tolerated.  2.  Follow up in 2 weeks.    SPECIMENS:  Deep culture taken from ankle arthrotomy.    INDICATIONS:  The patient is a pleasant 64-year-old female who has had   problems with her left ankle.  The above diagnoses made and options were   discussed with her including operative and nonoperative.  She elected to   undergo operative intervention.  The above procedure was discussed and all   questions were answered.  Risks of surgery were explained, which include but   not limited to wound problems, infection, nerve injury, vascular injury, need   for further surgery.  She understands she could have persistent risk for pain,   and need for further surgery.  She understands and accepts these risks and   agrees to proceed.  Her site was marked by myself prior to receiving   psychotropic medicines.    PROCEDURE IN DETAIL:  The patient was brought into the operating room.  She   underwent general endotracheal anesthesia without complications.  Her left   lower extremity was prepped and draped in standard fashion in supine  position   with all appropriate padding.  Positive site verification confirmed her left   lower extremity as well as above procedure and confirmation that she received   preoperative antibiotics.  Esmarch was used to exsanguinate her foot and ankle   and tourniquet was inflated is 250 mmHg.  Using her previous medial incision   was made, dissected down to the periosteum, supraperiosteal dissection was   performed.  The posterior tibialis tendon sheath was identified and opened up.    Evaluation of the posterior tibialis tendon demonstrated it to be pristine   with no splits, tears or tendinosis or fraying and excellent excursion.    Deltoid ligament was then taken off from the tip of the medial malleolus   anterior all the way up into the joint.  This allowed for an arthrotomy.    Rongeur was used to get the synovial biopsy.  This was sent off for culture.    Any potential impingement sites were excised with a rongeur to where there was   no evidence for any impingement in the medial gutter.  Two guide pins from   the Melani 4.0 cannulated screws were placed up into the medial malleolus   under C-arm guidance to avoid any impact with her implant.  The position was   confirmed on C-arm imaging.  Two fully threaded 40 mm screws were placed.    Next, a 3.5 corkscrew anchor was placed at the junction between the 2 screws   ____ the tibionavicular that is brought to the tibial spring portion of the   ligament and these were then tightened.  This was then augmented with numerous   0 Vicryls in a mattress type fashion.  She had excellent stability.  Final   C-arm imaging demonstrates satisfactory position for internal fixation   alignment of the foot.  Wounds were then irrigated with copious irrigation and   closed in layered fashion with 3-0 Vicryl and 3-0 nylon.  Sterile dressings   were applied.  Tourniquet released.  All toes were pink.  She was transferred   to the recovery room in good condition.    Utilization of  Dr. Serna was necessary for patient positioning, holding,   retracting, wound closure and dressing placement.  She was present throughout   the entire procedure.       ____________________________________     MD DYLON MCBRIDE / CARMEN    DD:  09/09/2019 18:39:31  DT:  09/09/2019 20:21:51    D#:  [id number]  Job#:  [id number]    cc: [facility]

## 2019-09-10 NOTE — OR NURSING
Patient  Qu[place] updated with patient progress.  Will update as needed and when patient meets criteria for stage 2.  Patient plan is to discharge home when meets criteria

## 2019-09-10 NOTE — OR NURSING
Dr Terry updated with patient status now.  Patient states her pain is better but still feels pressure and pain to medial aspect of left ankle.  Dr Pardoeen to come and speak to patient when finished with present surgery prior to patient transfer to stage 2.

## 2019-09-10 NOTE — OR NURSING
Dr Sarah at bedside to assess patient and answer questions.  Patient now states pain is tolerable and able to go home.  Dr Sarah ok to send patient to stage 2.  Patient  Quincy brought to bedside

## 2019-09-10 NOTE — DISCHARGE INSTRUCTIONS
ACTIVITY: Rest and take it easy for the first 24 hours.  A responsible adult is recommended to remain with you during that time.  It is normal to feel sleepy.  We encourage you to not do anything that requires balance, judgment or coordination.    MILD FLU-LIKE SYMPTOMS ARE NORMAL. YOU MAY EXPERIENCE GENERALIZED MUSCLE ACHES, THROAT IRRITATION, HEADACHE AND/OR SOME NAUSEA.    FOR 24 HOURS DO NOT:  Drive, operate machinery or run household appliances.  Drink beer or alcoholic beverages.   Make important decisions or sign legal documents.    SPECIAL INSTRUCTIONS: Please follow all MD instructions  Weight baring as tolerated to Left lower leg  Dressing on until f/u  F/u in 2 weeks    DIET: To avoid nausea, slowly advance diet as tolerated, avoiding spicy or greasy foods for the first day.  Add more substantial food to your diet according to your physician's instructions.  Babies can be fed formula or breast milk as soon as they are hungry.  INCREASE FLUIDS AND FIBER TO AVOID CONSTIPATION.    SURGICAL DRESSING/BATHING: Please keep dressing clean, dry, and intact. Please keep dressing on until follow up appointment. No baths, hot tubs, or swimming until cleared by MD.     FOLLOW-UP APPOINTMENT:  A follow-up appointment should be arranged with your doctor in 1-2 weeks; call to schedule.    You should CALL YOUR PHYSICIAN if you develop:  Fever greater than 101 degrees F.  Pain not relieved by medication, or persistent nausea or vomiting.  Excessive bleeding (blood soaking through dressing) or unexpected drainage from the wound.  Extreme redness or swelling around the incision site, drainage of pus or foul smelling drainage.  Inability to urinate or empty your bladder within 8 hours.  Problems with breathing or chest pain.    You should call 911 if you develop problems with breathing or chest pain.  If you are unable to contact your doctor or surgical center, you should go to the nearest emergency room or urgent care  center.  Physician's telephone #: Dr. Sarah [phone]    If any questions arise, call your doctor.  If your doctor is not available, please feel free to call the Surgical Center at [phone].  The Center is open Monday through Friday from 7AM to 7PM.  You can also call the HEALTH HOTLINE open 24 hours/day, 7 days/week and speak to a nurse at [phone], or toll free at [phone].    A registered nurse may call you a few days after your surgery to see how you are doing after your procedure.    MEDICATIONS: Resume taking daily medication.  Take prescribed pain medication with food.  If no medication is prescribed, you may take non-aspirin pain medication if needed.  PAIN MEDICATION CAN BE VERY CONSTIPATING.  Take a stool softener or laxative such as senokot, pericolace, or milk of magnesia if needed.    Prescription given for Norco (for pain) .  Last pain medication given at 4:31 pm (oxycodone).    If your physician has prescribed pain medication that includes Acetaminophen (Tylenol), do not take additional Acetaminophen (Tylenol) while taking the prescribed medication.    Depression / Suicide Risk    As [facility] are [facility] from [facility], it is important to learn how to keep safe from harming yourself.    Recognize the warning signs:  · Abrupt changes in personality, positive or negative- including increase in energy   · Giving away possessions  · Change in eating patterns- significant weight changes-  positive or negative  · Change in sleeping patterns- unable to sleep or sleeping all the time   · Unwillingness or inability to communicate  · Depression  · Unusual sadness, discouragement and loneliness  · Talk of wanting to die  · Neglect of personal appearance   · Rebelliousness- reckless behavior  · Withdrawal from people/activities they love  · Confusion- inability to concentrate     If you or a loved one observes any of these behaviors or has concerns about self-harm, here's  what you can do:  · Talk about it- your feelings and reasons for harming yourself  · Remove any means that you might use to hurt yourself (examples: pills, rope, extension cords, firearm)  · Get professional help from the community (Mental Health, Substance Abuse, psychological counseling)  · Do not be alone:Call your Safe Contact- someone whom you trust who will be there for you.  · Call your local CRISIS HOTLINE 350-4154 or 169-699-5060  · Call your local Children's Mobile Crisis Response Team [place] (367) 621-6824 or www.i4.ms  · Call the toll free National Suicide Prevention Hotlines   · National Suicide Prevention Lifeline 854-260-MEST (4854)  · National Hope Line Network 800-SUICIDE (253-4781)

## 2019-09-10 NOTE — PROGRESS NOTES
Pt arrived to floor from PACU. Pt A+Ox4, able to make needs known, PIV Patent and SL. Pain 5/10 to LLE.   CSMT's and DESTIN's WNL, pt aware of WBAT, Ice pack applied. Educated pt on call light and Incentive Spirometer.     Pt's VSS; denies N/V; states pain is 5/10 but tolerable level. Dressing CDI to LLE. D/c orders received. IV dc'd. Pt changed into clothing with assistance. Discharge instructions given; pt and family verbalized understanding and questions answered. Patient states ready to d/c home. Prescriptions given. Pt  Completed phase 2.

## 2019-09-12 LAB
BACTERIA TISS AEROBE CULT: NORMAL
GRAM STN SPEC: NORMAL
SIGNIFICANT IND 70042: NORMAL
SITE SITE: NORMAL
SOURCE SOURCE: NORMAL

## 2019-09-14 LAB
BACTERIA SPEC ANAEROBE CULT: NORMAL
SIGNIFICANT IND 70042: NORMAL
SITE SITE: NORMAL
SOURCE SOURCE: NORMAL

## 2019-09-19 ENCOUNTER — NON-PROVIDER VISIT (OUTPATIENT)
Dept: MEDICAL GROUP | Facility: MEDICAL CENTER | Age: 64
End: 2019-09-19
Payer: MEDICARE

## 2019-09-19 DIAGNOSIS — Z23 NEED FOR VACCINATION: ICD-10-CM

## 2019-09-19 PROCEDURE — G0008 ADMIN INFLUENZA VIRUS VAC: HCPCS | Performed by: FAMILY MEDICINE

## 2019-09-19 PROCEDURE — 90662 IIV NO PRSV INCREASED AG IM: CPT | Performed by: FAMILY MEDICINE

## 2019-09-19 NOTE — PROGRESS NOTES
"Uzma Gamez is a 64 y.o. female here for a non-provider visit for:   FLU    Reason for immunization: Annual Flu Vaccine  Immunization records indicate need for vaccine: Yes, confirmed with Epic  Minimum interval has been met for this vaccine: Yes  ABN completed: No    Order and dose verified by: SUDHEER  VIS Dated  8/7/15 was given to patient: Yes  All IAC Questionnaire questions were answered \"No.\"    Patient tolerated injection and no adverse effects were observed or reported: Yes    Pt scheduled for next dose in series: No      "

## 2019-10-09 LAB
FUNGUS SPEC CULT: NORMAL
SIGNIFICANT IND 70042: NORMAL
SITE SITE: NORMAL
SOURCE SOURCE: NORMAL

## 2019-11-04 LAB
MYCOBACTERIUM SPEC CULT: NORMAL
RHODAMINE-AURAMINE STN SPEC: NORMAL
SIGNIFICANT IND 70042: NORMAL
SITE SITE: NORMAL
SOURCE SOURCE: NORMAL

## 2019-12-26 ENCOUNTER — HOSPITAL ENCOUNTER (OUTPATIENT)
Dept: RADIOLOGY | Facility: MEDICAL CENTER | Age: 64
End: 2019-12-26
Attending: FAMILY MEDICINE
Payer: MEDICARE

## 2019-12-26 DIAGNOSIS — Z12.31 VISIT FOR SCREENING MAMMOGRAM: ICD-10-CM

## 2019-12-26 PROCEDURE — 77063 BREAST TOMOSYNTHESIS BI: CPT

## 2020-01-27 ENCOUNTER — HOSPITAL ENCOUNTER (OUTPATIENT)
Facility: MEDICAL CENTER | Age: 65
End: 2020-01-27
Attending: NURSE PRACTITIONER
Payer: MEDICARE

## 2020-01-27 ENCOUNTER — OFFICE VISIT (OUTPATIENT)
Dept: URGENT CARE | Facility: MEDICAL CENTER | Age: 65
End: 2020-01-27
Payer: MEDICARE

## 2020-01-27 VITALS
RESPIRATION RATE: 16 BRPM | HEART RATE: 92 BPM | OXYGEN SATURATION: 96 % | DIASTOLIC BLOOD PRESSURE: 66 MMHG | TEMPERATURE: 98.3 F | SYSTOLIC BLOOD PRESSURE: 102 MMHG

## 2020-01-27 DIAGNOSIS — N30.01 ACUTE CYSTITIS WITH HEMATURIA: ICD-10-CM

## 2020-01-27 DIAGNOSIS — R39.9 UTI SYMPTOMS: ICD-10-CM

## 2020-01-27 DIAGNOSIS — R30.0 DYSURIA: ICD-10-CM

## 2020-01-27 LAB
APPEARANCE UR: NORMAL
BILIRUB UR STRIP-MCNC: NORMAL MG/DL
COLOR UR AUTO: NORMAL
GLUCOSE UR STRIP.AUTO-MCNC: NORMAL MG/DL
KETONES UR STRIP.AUTO-MCNC: NORMAL MG/DL
LEUKOCYTE ESTERASE UR QL STRIP.AUTO: NORMAL
NITRITE UR QL STRIP.AUTO: NORMAL
PH UR STRIP.AUTO: 7 [PH] (ref 5–8)
PROT UR QL STRIP: 30 MG/DL
RBC UR QL AUTO: NORMAL
SP GR UR STRIP.AUTO: 1.02
UROBILINOGEN UR STRIP-MCNC: 0.2 MG/DL

## 2020-01-27 PROCEDURE — 87186 SC STD MICRODIL/AGAR DIL: CPT

## 2020-01-27 PROCEDURE — 99214 OFFICE O/P EST MOD 30 MIN: CPT | Performed by: NURSE PRACTITIONER

## 2020-01-27 PROCEDURE — 87077 CULTURE AEROBIC IDENTIFY: CPT

## 2020-01-27 PROCEDURE — 81002 URINALYSIS NONAUTO W/O SCOPE: CPT | Performed by: NURSE PRACTITIONER

## 2020-01-27 PROCEDURE — 87086 URINE CULTURE/COLONY COUNT: CPT

## 2020-01-27 RX ORDER — NITROFURANTOIN 25; 75 MG/1; MG/1
100 CAPSULE ORAL EVERY 12 HOURS
Qty: 10 CAP | Refills: 0 | Status: SHIPPED | OUTPATIENT
Start: 2020-01-27 | End: 2020-02-01

## 2020-01-27 RX ORDER — PHENAZOPYRIDINE HYDROCHLORIDE 200 MG/1
200 TABLET, FILM COATED ORAL 3 TIMES DAILY PRN
Qty: 6 TAB | Refills: 0 | Status: SHIPPED | OUTPATIENT
Start: 2020-01-27 | End: 2020-03-02

## 2020-01-27 ASSESSMENT — ENCOUNTER SYMPTOMS
SWEATS: 0
CHILLS: 0
FLANK PAIN: 0
VOMITING: 0
NAUSEA: 0

## 2020-01-27 NOTE — PROGRESS NOTES
Subjective:      Uzma Gamez is a 64 y.o. female who presents with Cystitis (frequency, burning, x2days)    Past Medical History:   Diagnosis Date   • Anesthesia     hard to wake up   • Arthritis     left ankle   • Bilateral cataracts     johann IOL    • Fall at home, initial encounter 2019   • H/O: hysterectomy 2009   • High cholesterol    • Plantar fasciitis 2009   • Previous back surgery 2009       Past Surgical History:   Procedure Laterality Date   • BONE SPUR EXCISION Left 2019    Procedure: EXCISION, BONE SPUR- MEDIAL MALLEOLAR;  Surgeon: Rao Sarah M.D.;  Location: SURGERY [facility];  Service: Orthopedics   • ANKLE ORIF Left 2019    Procedure: ORIF, ANKLE- DELTOID RECONSTRUCTION AND PLACEMENT OF MEDIAL MALLEOLAR SCREW;  Surgeon: Rao Sarah M.D.;  Location: SURGERY [facility];  Service: Orthopedics   • ANKLE TOTAL ARTHROPLASTY Left 2018    Procedure: ANKLE TOTAL ARTHROPLASTY;  Surgeon: Rao Sarah M.D.;  Location: SURGERY [facility];  Service: Orthopedics   • ANKLE ORIF Left 2017    left ankle x 3   • ORTHOPEDIC OSTEOTOMY Left 8/10/2016    Procedure: ORTHOPEDIC OSTEOTOMY - FOR ULNAR SHORTENING OSTEOTOMY;  Surgeon: Jerrell Linn M.D.;  Location: SURGERY [facility];  Service:    • HYSTERECTOMY, VAGINAL  2009   • OTHER NEUROLOGICAL SURG  2003    back surgery   • CERVICAL LAMINECTOMY POSTERIOR     • HYSTERECTOMY LAPAROSCOPY     • OTHER      bilat cataracts removal       Family history review with pt and not pertinent to today's problem.     Social History     Tobacco Use   • Smoking status: Former Smoker     Packs/day: 0.50     Years: 6.00     Pack years: 3.00     Types: Cigarettes     Last attempt to quit: 1978     Years since quittin.4   • Smokeless tobacco: Never Used   Substance Use Topics   • Alcohol use: Yes     Alcohol/week: 0.0 oz     Comment: 2 per week        Allergies   Allergen Reactions   • Nkda [No Known Drug  Allergy]              Dysuria    This is a new problem. Episode onset:  2 days. The problem has been gradually worsening. The quality of the pain is described as aching and burning. The pain is at a severity of 6/10. The pain is moderate. There has been no fever. She is sexually active. There is no history of pyelonephritis. Associated symptoms include frequency, hesitancy and urgency. Pertinent negatives include no chills, discharge, flank pain, hematuria, nausea, sweats or vomiting. She has tried increased fluids and acetaminophen for the symptoms. The treatment provided mild relief.       Review of Systems   Constitutional: Negative for chills.   Gastrointestinal: Negative for nausea and vomiting.   Genitourinary: Positive for dysuria, frequency, hesitancy and urgency. Negative for flank pain and hematuria.          Objective:     /66   Pulse 92   Temp 36.8 °C (98.3 °F) (Temporal)   Resp 16   SpO2 96%      Physical Exam  Vitals signs and nursing note reviewed.   Constitutional:       General: She is not in acute distress.     Appearance: Normal appearance. She is well-developed. She is not ill-appearing, toxic-appearing or diaphoretic.   HENT:      Head: Normocephalic.      Mouth/Throat:      Mouth: Mucous membranes are moist.   Cardiovascular:      Rate and Rhythm: Normal rate and regular rhythm.   Pulmonary:      Effort: Pulmonary effort is normal.   Abdominal:      General: There is no distension.      Palpations: Abdomen is soft. Abdomen is not rigid.      Tenderness: There is no tenderness. There is no guarding.   Musculoskeletal:      Lumbar back: Normal.   Skin:     General: Skin is warm and dry.      Capillary Refill: Capillary refill takes less than 2 seconds.   Neurological:      Mental Status: She is alert and oriented to person, place, and time.   Psychiatric:         Mood and Affect: Mood normal.         Behavior: Behavior normal. Behavior is cooperative.         Thought Content: Thought  content normal.             Results for orders placed or performed in visit on 01/27/20   POCT Urinalysis   Result Value Ref Range    POC Color dark yellow Negative    POC Appearance cloudy Negative    POC Leukocyte Esterase large Negative    POC Nitrites neg Negative    POC Urobiligen 0.2 Negative (0.2) mg/dL    POC Protein 30 Negative mg/dL    POC Urine PH 7.0 5.0 - 8.0    POC Blood large Negative    POC Specific Gravity 1.025 <1.005 - >1.030    POC Ketones neg Negative mg/dL    POC Bilirubin neg Negative mg/dL    POC Glucose neg Negative mg/dL            Assessment/Plan:       1. UTI symptoms  POCT Urinalysis    nitrofurantoin monohyd macro (MACROBID) 100 MG Cap    phenazopyridine (PYRIDIUM) 200 MG Tab    POCT Urinalysis    Urine Culture   2. Acute cystitis with hematuria  nitrofurantoin monohyd macro (MACROBID) 100 MG Cap    phenazopyridine (PYRIDIUM) 200 MG Tab    POCT Urinalysis    Urine Culture   3. Dysuria  nitrofurantoin monohyd macro (MACROBID) 100 MG Cap    phenazopyridine (PYRIDIUM) 200 MG Tab    POCT Urinalysis    Urine Culture       Educated in proper administration of medication(s) ordered today including safety, possible SE, risks, benefits, rationale and alternatives to therapy.     Keep well hydrated    Return to urgent care clinic or PCP  3-4 days if current symptoms are not resolving in a satisfactory manner or sooner if new or worsening symptoms occur. Differential diagnosis, natural history, supportive care, and indications for immediate follow-up discussed at length.   Advised of signs and symptoms which would warrant further evaluation and /or emergent evaluation in ER.    Verbalized agreement with this treatment plan and seemed to understand without barriers. Questions were encouraged and answered to satisfaction.

## 2020-01-30 LAB
BACTERIA UR CULT: ABNORMAL
BACTERIA UR CULT: ABNORMAL
SIGNIFICANT IND 70042: ABNORMAL
SITE SITE: ABNORMAL
SOURCE SOURCE: ABNORMAL

## 2020-02-24 ENCOUNTER — HOSPITAL ENCOUNTER (OUTPATIENT)
Dept: LAB | Facility: MEDICAL CENTER | Age: 65
End: 2020-02-24
Attending: FAMILY MEDICINE
Payer: MEDICARE

## 2020-02-24 DIAGNOSIS — E78.00 PURE HYPERCHOLESTEROLEMIA: Chronic | ICD-10-CM

## 2020-02-24 DIAGNOSIS — I25.10 CORONARY ARTERY DISEASE INVOLVING NATIVE CORONARY ARTERY OF NATIVE HEART WITHOUT ANGINA PECTORIS: Chronic | ICD-10-CM

## 2020-02-24 DIAGNOSIS — R73.01 ELEVATED FASTING BLOOD SUGAR: ICD-10-CM

## 2020-02-24 LAB
ALBUMIN SERPL BCP-MCNC: 4.3 G/DL (ref 3.2–4.9)
ALBUMIN/GLOB SERPL: 1.3 G/DL
ALP SERPL-CCNC: 59 U/L (ref 30–99)
ALT SERPL-CCNC: 12 U/L (ref 2–50)
ANION GAP SERPL CALC-SCNC: 9 MMOL/L (ref 0–11.9)
AST SERPL-CCNC: 14 U/L (ref 12–45)
BASOPHILS # BLD AUTO: 0.7 % (ref 0–1.8)
BASOPHILS # BLD: 0.04 K/UL (ref 0–0.12)
BILIRUB SERPL-MCNC: 0.7 MG/DL (ref 0.1–1.5)
BUN SERPL-MCNC: 26 MG/DL (ref 8–22)
CALCIUM SERPL-MCNC: 9.4 MG/DL (ref 8.5–10.5)
CHLORIDE SERPL-SCNC: 103 MMOL/L (ref 96–112)
CHOLEST SERPL-MCNC: 217 MG/DL (ref 100–199)
CO2 SERPL-SCNC: 28 MMOL/L (ref 20–33)
CREAT SERPL-MCNC: 0.81 MG/DL (ref 0.5–1.4)
EOSINOPHIL # BLD AUTO: 0.23 K/UL (ref 0–0.51)
EOSINOPHIL NFR BLD: 4.2 % (ref 0–6.9)
ERYTHROCYTE [DISTWIDTH] IN BLOOD BY AUTOMATED COUNT: 40.6 FL (ref 35.9–50)
EST. AVERAGE GLUCOSE BLD GHB EST-MCNC: 111 MG/DL
FASTING STATUS PATIENT QL REPORTED: NORMAL
GLOBULIN SER CALC-MCNC: 3.2 G/DL (ref 1.9–3.5)
GLUCOSE SERPL-MCNC: 98 MG/DL (ref 65–99)
HBA1C MFR BLD: 5.5 % (ref 0–5.6)
HCT VFR BLD AUTO: 48.9 % (ref 37–47)
HDLC SERPL-MCNC: 63 MG/DL
HGB BLD-MCNC: 15.7 G/DL (ref 12–16)
IMM GRANULOCYTES # BLD AUTO: 0.01 K/UL (ref 0–0.11)
IMM GRANULOCYTES NFR BLD AUTO: 0.2 % (ref 0–0.9)
LDLC SERPL CALC-MCNC: 130 MG/DL
LYMPHOCYTES # BLD AUTO: 2.05 K/UL (ref 1–4.8)
LYMPHOCYTES NFR BLD: 37 % (ref 22–41)
MCH RBC QN AUTO: 30.7 PG (ref 27–33)
MCHC RBC AUTO-ENTMCNC: 32.1 G/DL (ref 33.6–35)
MCV RBC AUTO: 95.7 FL (ref 81.4–97.8)
MONOCYTES # BLD AUTO: 0.51 K/UL (ref 0–0.85)
MONOCYTES NFR BLD AUTO: 9.2 % (ref 0–13.4)
NEUTROPHILS # BLD AUTO: 2.7 K/UL (ref 2–7.15)
NEUTROPHILS NFR BLD: 48.7 % (ref 44–72)
NRBC # BLD AUTO: 0 K/UL
NRBC BLD-RTO: 0 /100 WBC
PLATELET # BLD AUTO: 350 K/UL (ref 164–446)
PMV BLD AUTO: 9.6 FL (ref 9–12.9)
POTASSIUM SERPL-SCNC: 4.1 MMOL/L (ref 3.6–5.5)
PROT SERPL-MCNC: 7.5 G/DL (ref 6–8.2)
RBC # BLD AUTO: 5.11 M/UL (ref 4.2–5.4)
SODIUM SERPL-SCNC: 140 MMOL/L (ref 135–145)
TRIGL SERPL-MCNC: 118 MG/DL (ref 0–149)
WBC # BLD AUTO: 5.5 K/UL (ref 4.8–10.8)

## 2020-02-24 PROCEDURE — 84443 ASSAY THYROID STIM HORMONE: CPT

## 2020-02-24 PROCEDURE — 85025 COMPLETE CBC W/AUTO DIFF WBC: CPT

## 2020-02-24 PROCEDURE — 80061 LIPID PANEL: CPT

## 2020-02-24 PROCEDURE — 83036 HEMOGLOBIN GLYCOSYLATED A1C: CPT | Mod: GA

## 2020-02-24 PROCEDURE — 80053 COMPREHEN METABOLIC PANEL: CPT

## 2020-02-24 PROCEDURE — 36415 COLL VENOUS BLD VENIPUNCTURE: CPT | Mod: GA

## 2020-02-25 LAB — TSH SERPL DL<=0.005 MIU/L-ACNC: 4.2 UIU/ML (ref 0.38–5.33)

## 2020-03-02 ENCOUNTER — OFFICE VISIT (OUTPATIENT)
Dept: MEDICAL GROUP | Facility: MEDICAL CENTER | Age: 65
End: 2020-03-02
Payer: MEDICARE

## 2020-03-02 VITALS
BODY MASS INDEX: 31.02 KG/M2 | HEART RATE: 83 BPM | TEMPERATURE: 97.3 F | SYSTOLIC BLOOD PRESSURE: 112 MMHG | WEIGHT: 193 LBS | OXYGEN SATURATION: 95 % | DIASTOLIC BLOOD PRESSURE: 64 MMHG | HEIGHT: 66 IN

## 2020-03-02 DIAGNOSIS — E66.9 OBESITY (BMI 30-39.9): ICD-10-CM

## 2020-03-02 DIAGNOSIS — R73.01 ELEVATED FASTING BLOOD SUGAR: ICD-10-CM

## 2020-03-02 DIAGNOSIS — Z00.00 MEDICARE ANNUAL WELLNESS VISIT, SUBSEQUENT: ICD-10-CM

## 2020-03-02 DIAGNOSIS — S89.91XA RIGHT LEG INJURY, INITIAL ENCOUNTER: ICD-10-CM

## 2020-03-02 DIAGNOSIS — J01.00 ACUTE NON-RECURRENT MAXILLARY SINUSITIS: ICD-10-CM

## 2020-03-02 DIAGNOSIS — N39.3 STRESS INCONTINENCE IN FEMALE: ICD-10-CM

## 2020-03-02 DIAGNOSIS — M85.89 OSTEOPENIA OF MULTIPLE SITES: ICD-10-CM

## 2020-03-02 DIAGNOSIS — Z78.0 ASYMPTOMATIC MENOPAUSAL STATE: ICD-10-CM

## 2020-03-02 DIAGNOSIS — I25.10 CORONARY ARTERY DISEASE INVOLVING NATIVE CORONARY ARTERY OF NATIVE HEART WITHOUT ANGINA PECTORIS: Chronic | ICD-10-CM

## 2020-03-02 DIAGNOSIS — E78.00 PURE HYPERCHOLESTEROLEMIA: Chronic | ICD-10-CM

## 2020-03-02 PROCEDURE — G0439 PPPS, SUBSEQ VISIT: HCPCS | Performed by: FAMILY MEDICINE

## 2020-03-02 RX ORDER — AMOXICILLIN 875 MG/1
875 TABLET, COATED ORAL 2 TIMES DAILY
Qty: 20 TAB | Refills: 0 | Status: SHIPPED | OUTPATIENT
Start: 2020-03-02 | End: 2020-03-12

## 2020-03-02 RX ORDER — ATORVASTATIN CALCIUM 20 MG/1
20 TABLET, FILM COATED ORAL
Qty: 90 TAB | Refills: 3 | Status: SHIPPED | OUTPATIENT
Start: 2020-03-02 | End: 2021-03-04 | Stop reason: SDUPTHER

## 2020-03-02 RX ORDER — FLUTICASONE PROPIONATE 50 MCG
1 SPRAY, SUSPENSION (ML) NASAL 2 TIMES DAILY
Qty: 1 BOTTLE | Refills: 5 | Status: SHIPPED | OUTPATIENT
Start: 2020-03-02 | End: 2021-07-22 | Stop reason: SDUPTHER

## 2020-03-02 RX ORDER — CODEINE PHOSPHATE AND GUAIFENESIN 10; 100 MG/5ML; MG/5ML
5 SOLUTION ORAL EVERY 4 HOURS PRN
Qty: 200 ML | Refills: 0 | Status: SHIPPED
Start: 2020-03-02 | End: 2020-03-09

## 2020-03-02 ASSESSMENT — FIBROSIS 4 INDEX: FIB4 SCORE: 0.75

## 2020-03-02 ASSESSMENT — ENCOUNTER SYMPTOMS: GENERAL WELL-BEING: EXCELLENT

## 2020-03-02 ASSESSMENT — PATIENT HEALTH QUESTIONNAIRE - PHQ9: CLINICAL INTERPRETATION OF PHQ2 SCORE: 0

## 2020-03-02 ASSESSMENT — ACTIVITIES OF DAILY LIVING (ADL): BATHING_REQUIRES_ASSISTANCE: 0

## 2020-03-02 NOTE — PROGRESS NOTES
Chief Complaint   Patient presents with   • Annual Exam   • Nasal Congestion     x6 days w/ cough         HPI:  Uzma Gamez is a 64 y.o. here for Medicare Annual Wellness Visit     Started 1 week ago with sore throat, about 3-4 days later she started to come down with increased coughing and mucus. There is itching in her left ear and sensitivity of right side of face. Symptoms are not improving. Taking NyQuil before bed.      11 months ago she had her leg crossed and sneezed. She had a ripping sensation in right lateral upper leg with bruising the next day. Walking makes it worse. Voltaren 1% helps.      Has been on Fosamax then Actonel for a total of 3 years.    FINDINGS:  The mean bone mineral density for the lumbar spine is 0.987 g/cm2, which corresponds to a T score of -1.6 and a Z score of -1.2.     The proximal left femur has a mean bone mineral density of 0.806 g/cm2, with a T score of -1.6 and a Z score of -1.2.     When compared with the most recent study dated 12/18/2012, there has been a 6.6% increase in the bone mineral density of the lumbar spine and a 3.5% decrease in the bone mineral density of the left femur.        IMPRESSION:     According to the World Health Organization classification, bone mineral density of this patient is osteopenia.           10-year Probability of Fracture:  Major Osteoporotic     4.7%  Hip     0.5%      Patient Active Problem List    Diagnosis Date Noted   • Stress incontinence in female 03/02/2020   • Neck pain on right side 07/30/2019   • Post-traumatic arthritis of left ankle 08/27/2018   • Trigeminal neuralgia pain 02/23/2018   • Obesity (BMI 30-39.9) 11/22/2017   • Elevated fasting blood sugar 02/22/2017   • CAD (coronary artery disease) 12/05/2016   • S/P ORIF (open reduction internal fixation) fracture 12/02/2016   • Vitamin D insufficiency 02/12/2016   • Osteopenia 10/12/2015   • Hyperpigmentation of skin 10/28/2014   • Hx of cataract surgery 03/26/2014   •  Hyperlipidemia 03/26/2014   • Previous back surgery 06/05/2009   • H/O: hysterectomy 06/05/2009       Current Outpatient Medications   Medication Sig Dispense Refill   • atorvastatin (LIPITOR) 20 MG Tab Take 1 Tab by mouth every bedtime. 90 Tab 3   • Diclofenac Sodium (VOLTAREN) 1 % Gel Apply 2 g to affected area(s) 2 times a day as needed (back pain). 300 g 3   • guaifenesin-codeine (CHERATUSSIN AC) Solution oral solution Take 5 mL by mouth every four hours as needed for Cough for up to 7 days. 200 mL 0   • amoxicillin (AMOXIL) 875 MG tablet Take 1 Tab by mouth 2 times a day for 10 days. 20 Tab 0   • fluticasone (FLONASE) 50 MCG/ACT nasal spray Spray 1 Spray in nose 2 times a day. 1 Bottle 5   • Polyethyl Glycol-Propyl Glycol (SYSTANE OP) 1 Drop by Ophthalmic route every morning.     • Melatonin 5 MG Cap Take 5 mg by mouth at bedtime as needed.     • SUPER B COMPLEX/C PO Take 1 Tab by mouth every morning.     • Omega-3 Fatty Acids (OMEGA 3 PO) Take 1,500 mg by mouth every morning.       No current facility-administered medications for this visit.             Current supplements as per medication list.       Allergies: Nkda [no known drug allergy]    Current social contact/activities: lives with  and they are active.    She  reports that she quit smoking about 41 years ago. Her smoking use included cigarettes. She has a 3.00 pack-year smoking history. She has never used smokeless tobacco. She reports current alcohol use. She reports that she does not use drugs.  Counseling given: Not Answered      DPA/Advanced Directive:  Patient has Advanced Directive, but it is not on file. Instructed to bring in a copy to scan into their chart.    ROS:    Gait: Uses no assistive device  Ostomy: No  Other tubes: No  Amputations: No  Chronic oxygen use: No  Last eye exam: 1 month ago.  Wears hearing aids: No   : Reports urinary leakage during the last 6 months that has somewhat interfered with their daily activities or  sleep.        Depression Screening    Little interest or pleasure in doing things?  0 - not at all  Feeling down, depressed , or hopeless? 0 - not at all  Patient Health Questionnaire Score: 0     If depressive symptoms identified deferred to follow up visit unless specifically addressed in assessment and plan.    Interpretation of PHQ-9 Total Score   Score Severity   1-4 No Depression   5-9 Mild Depression   10-14 Moderate Depression   15-19 Moderately Severe Depression   20-27 Severe Depression    Screening for Cognitive Impairment    Three Minute Recall (village, kitchen, baby) 3/3    [place] clock face with all 12 numbers and set the hands to show 10 past 10.  Yes    Cognitive concerns identified deferred for follow up unless specifically addressed in assessment and plan.    Fall Risk Assessment    Has the patient had two or more falls in the last year or any fall with injury in the last year?  No    Safety Assessment    Throw rugs on floor.  Yes  Handrails on all stairs.  Yes  Good lighting in all hallways.  Yes  Difficulty hearing.  No  Patient counseled about all safety risks that were identified.    Functional Assessment ADLs    Are there any barriers preventing you from cooking for yourself or meeting nutritional needs?  No.    Are there any barriers preventing you from driving safely or obtaining transportation?  No.    Are there any barriers preventing you from using a telephone or calling for help?  No.    Are there any barriers preventing you from shopping?  No.    Are there any barriers preventing you from taking care of your own finances?  No.    Are there any barriers preventing you from managing your medications?  No.    Are there any barriers preventing you from showering, bathing or dressing yourself?  No.    Are you currently engaging in any exercise or physical activity?  Yes.  walking  What is your perception of your health?  Excellent.      Health Maintenance Summary                IMM ZOSTER  VACCINES Overdue 2014      Done 2014 Imm Admin: Zoster Vaccine Live (ZVL) (Zostavax)    IMM PNEUMOCOCCAL VACCINE: 65+ Years Next Due 3/22/2020      Done 3/22/2019 Imm Admin: Pneumococcal Conjugate Vaccine (Prevnar/PCV-13)    BONE DENSITY Next Due 2020      Done 2015 DS-BONE DENSITY STUDY (DEXA)     Patient has more history with this topic...    MAMMOGRAM Next Due 2020      Done 2019 MA-SCREENING MAMMO BILAT W/TOMOSYNTHESIS W/CAD     Patient has more history with this topic...    IMM DTaP/Tdap/Td Vaccine Next Due 10/12/2025      Done 10/12/2015 Imm Admin: Tdap Vaccine    COLONOSCOPY Next Due 2028      Done 2018 REFERRAL TO GI FOR COLONOSCOPY     Patient has more history with this topic...          Patient Care Team:  Nabil Cifuentes M.D. as PCP - General (Family Medicine)  Loida Bryan M.D. as Consulting Physician (OB/Gyn)        Social History     Tobacco Use   • Smoking status: Former Smoker     Packs/day: 0.50     Years: 6.00     Pack years: 3.00     Types: Cigarettes     Last attempt to quit: 1978     Years since quittin.5   • Smokeless tobacco: Never Used   Substance Use Topics   • Alcohol use: Yes     Alcohol/week: 0.0 oz     Comment: 2 per week    • Drug use: No     Family History   Problem Relation Age of Onset   • Diabetes Mother    • Cancer Father         LUNG   • Diabetes Brother      She  has a past medical history of Anesthesia, Arthritis, Bilateral cataracts, Fall at home, initial encounter (2019), H/O: hysterectomy (2009), High cholesterol, Plantar fasciitis (2009), and Previous back surgery (2009).   Past Surgical History:   Procedure Laterality Date   • BONE SPUR EXCISION Left 2019    Procedure: EXCISION, BONE SPUR- MEDIAL MALLEOLAR;  Surgeon: Rao Sarah M.D.;  Location: SURGERY [facility];  Service: Orthopedics   • ANKLE ORIF Left 2019    Procedure: ORIF, ANKLE- DELTOID RECONSTRUCTION AND PLACEMENT OF MEDIAL  "MALLEOLAR SCREW;  Surgeon: Rao Sarah M.D.;  Location: SURGERY [facility];  Service: Orthopedics   • ANKLE TOTAL ARTHROPLASTY Left 8/27/2018    Procedure: ANKLE TOTAL ARTHROPLASTY;  Surgeon: Rao Sarah M.D.;  Location: SURGERY [facility];  Service: Orthopedics   • ANKLE ORIF Left 11/28/2017    left ankle x 3   • ORTHOPEDIC OSTEOTOMY Left 8/10/2016    Procedure: ORTHOPEDIC OSTEOTOMY - FOR ULNAR SHORTENING OSTEOTOMY;  Surgeon: Jerrell Linn M.D.;  Location: SURGERY [facility];  Service:    • HYSTERECTOMY, VAGINAL  2009   • OTHER NEUROLOGICAL SURG  2003    back surgery   • CERVICAL LAMINECTOMY POSTERIOR     • HYSTERECTOMY LAPAROSCOPY     • OTHER      bilat cataracts removal       Exam:   /64 (BP Location: Right arm, Patient Position: Sitting)   Pulse 83   Temp 36.3 °C (97.3 °F)   Ht 1.676 m (5' 6\")   Wt 87.5 kg (193 lb)   SpO2 95%  Body mass index is 31.15 kg/m².    Constitutional: Alert, no distress.  Skin: Warm, dry, good turgor, no rashes in visible areas.  Eye: Equal, round and reactive, conjunctiva clear, lids normal.  ENMT: Lips without lesions, good dentition, oropharynx clear. TMs pearly gray bilaterally. Thin clear nasal drainage. + right maxillary sinus pressure.  Neck: Trachea midline, no masses, no thyromegaly. No cervical or supraclavicular lymphadenopathy  Respiratory: Unlabored respiratory effort, lungs clear to auscultation, no wheezes, no ronchi.  Cardiovascular: Normal S1, S2, no murmur, no edema.  Psych: Alert and oriented x3, normal affect and mood.    Assessment and Plan. The following treatment and monitoring plan is recommended:    1. Medicare annual wellness visit, subsequent      2. Pure hypercholesterolemia  Uncontrolled.  Restart atorvastatin 20 mg daily.  Follow-up labs annually.  - atorvastatin (LIPITOR) 20 MG Tab; Take 1 Tab by mouth every bedtime.  Dispense: 90 Tab; Refill: 3  - Comp Metabolic Panel; Future  - Lipid Profile; Future  - TSH WITH " REFLEX TO FT4; Future    3. Acute non-recurrent maxillary sinusitis  New problem.  Prescription for amoxicillin, Flonase, codeine cough syrup.  Advised patient not to drive or mix codeine cough syrup with alcohol.  - guaifenesin-codeine (CHERATUSSIN AC) Solution oral solution; Take 5 mL by mouth every four hours as needed for Cough for up to 7 days.  Dispense: 200 mL; Refill: 0  - amoxicillin (AMOXIL) 875 MG tablet; Take 1 Tab by mouth 2 times a day for 10 days.  Dispense: 20 Tab; Refill: 0  - fluticasone (FLONASE) 50 MCG/ACT nasal spray; Spray 1 Spray in nose 2 times a day.  Dispense: 1 Bottle; Refill: 5    4. Elevated fasting blood sugar  Improved.  Advised healthy diet and regular exercise.  - HEMOGLOBIN A1C; Future    5. Osteopenia of multiple sites  Discontinue Actonel because she has been on it for 3-year and only had mild osteopenia.  Check DEXA and call with results.  - DS-BONE DENSITY STUDY (DEXA); Future    6. Asymptomatic menopausal state  Check DEXA and call with results.  - DS-BONE DENSITY STUDY (DEXA); Future    7. Right leg injury, initial encounter  Patient sent that she had a partial hamstring tear.  We will try physical therapy and diclofenac gel.  If no improvement consider MRI of right hip and orthopedic referral.  - REFERRAL TO PHYSICAL THERAPY Reason for Therapy: Eval/Treat/Report  - Diclofenac Sodium (VOLTAREN) 1 % Gel; Apply 2 g to affected area(s) 2 times a day as needed (back pain).  Dispense: 300 g; Refill: 3    8. Stress incontinence in female  Advised [place] exercises.    9. Obesity (BMI 30-39.9)  - Patient identified as having weight management issue.  Appropriate orders and counseling given.    10. Coronary artery disease involving native coronary artery of native heart without angina pectoris  Advised patient to restart atorvastatin.  - CBC WITH DIFFERENTIAL; Future            Services suggested: No services needed at this time  Health Care Screening: Age-appropriate preventive  services recommended by USPTF and ACIP covered by Medicare were discussed today. Services ordered if indicated and agreed upon by the patient.  Referrals offered: Community-based lifestyle interventions to reduce health risks and promote self-management and wellness, fall prevention, nutrition, physical activity, tobacco-use cessation, weight loss, and mental health services as per orders if indicated.    Discussion today about general wellness and lifestyle habits:    · Prevent falls and reduce trip hazards; Cautioned about securing or removing rugs.  · Have a working fire alarm and carbon monoxide detector;   · Engage in regular physical activity and social activities     Follow-up: Return in about 1 year (around 3/2/2021) for Annual.

## 2020-03-30 ENCOUNTER — PATIENT MESSAGE (OUTPATIENT)
Dept: MEDICAL GROUP | Facility: MEDICAL CENTER | Age: 65
End: 2020-03-30

## 2020-03-30 DIAGNOSIS — M19.032 PRIMARY OSTEOARTHRITIS OF LEFT WRIST: ICD-10-CM

## 2020-03-30 DIAGNOSIS — M19.041 PRIMARY OSTEOARTHRITIS OF RIGHT HAND: ICD-10-CM

## 2020-03-30 DIAGNOSIS — S89.91XA RIGHT LEG INJURY, INITIAL ENCOUNTER: ICD-10-CM

## 2020-03-30 DIAGNOSIS — M19.071 PRIMARY OSTEOARTHRITIS OF RIGHT FOOT: ICD-10-CM

## 2020-04-13 DIAGNOSIS — M19.041 PRIMARY OSTEOARTHRITIS OF RIGHT HAND: ICD-10-CM

## 2020-04-13 DIAGNOSIS — M19.071 PRIMARY OSTEOARTHRITIS OF RIGHT FOOT: ICD-10-CM

## 2020-04-13 DIAGNOSIS — M19.032 PRIMARY OSTEOARTHRITIS OF LEFT WRIST: ICD-10-CM

## 2020-04-13 NOTE — PROGRESS NOTES
PLEASE SPECIFY HOW MANY TUBES SHOULD BE DISPENSED.  Received request via: Pharmacy    Was the patient seen in the last year in this department? Yes    Does the patient have an active prescription (recently filled or refills available) for medication(s) requested? No

## 2020-05-20 ENCOUNTER — OFFICE VISIT (OUTPATIENT)
Dept: MEDICAL GROUP | Facility: MEDICAL CENTER | Age: 65
End: 2020-05-20
Payer: MEDICARE

## 2020-05-20 VITALS
WEIGHT: 196 LBS | BODY MASS INDEX: 31.5 KG/M2 | DIASTOLIC BLOOD PRESSURE: 72 MMHG | TEMPERATURE: 97.3 F | SYSTOLIC BLOOD PRESSURE: 116 MMHG | HEIGHT: 66 IN | HEART RATE: 80 BPM | OXYGEN SATURATION: 96 %

## 2020-05-20 DIAGNOSIS — B02.9 HERPES ZOSTER WITHOUT COMPLICATION: ICD-10-CM

## 2020-05-20 PROCEDURE — 99214 OFFICE O/P EST MOD 30 MIN: CPT | Performed by: FAMILY MEDICINE

## 2020-05-20 RX ORDER — VALACYCLOVIR HYDROCHLORIDE 1 G/1
1000 TABLET, FILM COATED ORAL 3 TIMES DAILY
Qty: 21 TAB | Refills: 0 | Status: SHIPPED | OUTPATIENT
Start: 2020-05-20 | End: 2020-05-27

## 2020-05-20 RX ORDER — GABAPENTIN 300 MG/1
300 CAPSULE ORAL 3 TIMES DAILY
COMMUNITY
End: 2020-05-20

## 2020-05-20 RX ORDER — PREDNISONE 20 MG/1
40 TABLET ORAL DAILY
Qty: 10 TAB | Refills: 0 | Status: SHIPPED | OUTPATIENT
Start: 2020-05-20 | End: 2020-05-25

## 2020-05-20 RX ORDER — ESTRADIOL 0.1 MG/G
CREAM VAGINAL DAILY
COMMUNITY
End: 2021-03-04

## 2020-05-20 RX ORDER — GABAPENTIN 600 MG/1
600 TABLET ORAL 3 TIMES DAILY
Qty: 90 TAB | Refills: 2 | Status: SHIPPED | OUTPATIENT
Start: 2020-05-20 | End: 2020-06-11

## 2020-05-20 ASSESSMENT — FIBROSIS 4 INDEX: FIB4 SCORE: 0.76

## 2020-08-18 ENCOUNTER — NON-PROVIDER VISIT (OUTPATIENT)
Dept: MEDICAL GROUP | Facility: MEDICAL CENTER | Age: 65
End: 2020-08-18
Payer: MEDICARE

## 2020-08-18 DIAGNOSIS — Z23 NEED FOR VACCINATION: ICD-10-CM

## 2020-08-18 PROCEDURE — G0009 ADMIN PNEUMOCOCCAL VACCINE: HCPCS | Performed by: FAMILY MEDICINE

## 2020-08-18 PROCEDURE — 90732 PPSV23 VACC 2 YRS+ SUBQ/IM: CPT | Performed by: FAMILY MEDICINE

## 2020-08-18 NOTE — PROGRESS NOTES
"Uzma Gamez is a 65 y.o. female here for a non-provider visit for:   PNEUMOVAX (PPSV23)    Reason for immunization: lack of immunity demonstrated on prior labs or testing  Immunization records indicate need for vaccine: Yes, confirmed with Epic  Minimum interval has been met for this vaccine: Yes  ABN completed: No    Order and dose verified by: SUDHEER  VIS Dated  10/30/19 was given to patient: Yes  All IAC Questionnaire questions were answered \"No.\"    Patient tolerated injection and no adverse effects were observed or reported: Yes    Pt scheduled for next dose in series: No    "

## 2021-02-23 ENCOUNTER — HOSPITAL ENCOUNTER (OUTPATIENT)
Dept: LAB | Facility: MEDICAL CENTER | Age: 66
End: 2021-02-23
Attending: FAMILY MEDICINE
Payer: MEDICARE

## 2021-02-23 DIAGNOSIS — E78.00 PURE HYPERCHOLESTEROLEMIA: Chronic | ICD-10-CM

## 2021-02-23 DIAGNOSIS — R73.01 ELEVATED FASTING BLOOD SUGAR: ICD-10-CM

## 2021-02-23 DIAGNOSIS — I25.10 CORONARY ARTERY DISEASE INVOLVING NATIVE CORONARY ARTERY OF NATIVE HEART WITHOUT ANGINA PECTORIS: Chronic | ICD-10-CM

## 2021-02-23 LAB
ALBUMIN SERPL BCP-MCNC: 4.2 G/DL (ref 3.2–4.9)
ALBUMIN/GLOB SERPL: 1.4 G/DL
ALP SERPL-CCNC: 94 U/L (ref 30–99)
ALT SERPL-CCNC: 16 U/L (ref 2–50)
ANION GAP SERPL CALC-SCNC: 9 MMOL/L (ref 7–16)
AST SERPL-CCNC: 17 U/L (ref 12–45)
BASOPHILS # BLD AUTO: 0.7 % (ref 0–1.8)
BASOPHILS # BLD: 0.04 K/UL (ref 0–0.12)
BILIRUB SERPL-MCNC: 0.7 MG/DL (ref 0.1–1.5)
BUN SERPL-MCNC: 18 MG/DL (ref 8–22)
CALCIUM SERPL-MCNC: 9.2 MG/DL (ref 8.4–10.2)
CHLORIDE SERPL-SCNC: 103 MMOL/L (ref 96–112)
CHOLEST SERPL-MCNC: 155 MG/DL (ref 100–199)
CO2 SERPL-SCNC: 29 MMOL/L (ref 20–33)
CREAT SERPL-MCNC: 0.62 MG/DL (ref 0.5–1.4)
EOSINOPHIL # BLD AUTO: 0.2 K/UL (ref 0–0.51)
EOSINOPHIL NFR BLD: 3.6 % (ref 0–6.9)
ERYTHROCYTE [DISTWIDTH] IN BLOOD BY AUTOMATED COUNT: 40.4 FL (ref 35.9–50)
EST. AVERAGE GLUCOSE BLD GHB EST-MCNC: 105 MG/DL
FASTING STATUS PATIENT QL REPORTED: NORMAL
GLOBULIN SER CALC-MCNC: 3.1 G/DL (ref 1.9–3.5)
GLUCOSE SERPL-MCNC: 101 MG/DL (ref 65–99)
HBA1C MFR BLD: 5.3 % (ref 4–5.6)
HCT VFR BLD AUTO: 45 % (ref 37–47)
HDLC SERPL-MCNC: 83 MG/DL
HGB BLD-MCNC: 14.7 G/DL (ref 12–16)
IMM GRANULOCYTES # BLD AUTO: 0.01 K/UL (ref 0–0.11)
IMM GRANULOCYTES NFR BLD AUTO: 0.2 % (ref 0–0.9)
LDLC SERPL CALC-MCNC: 46 MG/DL
LYMPHOCYTES # BLD AUTO: 1.96 K/UL (ref 1–4.8)
LYMPHOCYTES NFR BLD: 35.8 % (ref 22–41)
MCH RBC QN AUTO: 31.2 PG (ref 27–33)
MCHC RBC AUTO-ENTMCNC: 32.7 G/DL (ref 33.6–35)
MCV RBC AUTO: 95.5 FL (ref 81.4–97.8)
MONOCYTES # BLD AUTO: 0.57 K/UL (ref 0–0.85)
MONOCYTES NFR BLD AUTO: 10.4 % (ref 0–13.4)
NEUTROPHILS # BLD AUTO: 2.7 K/UL (ref 2–7.15)
NEUTROPHILS NFR BLD: 49.3 % (ref 44–72)
NRBC # BLD AUTO: 0 K/UL
NRBC BLD-RTO: 0 /100 WBC
PLATELET # BLD AUTO: 325 K/UL (ref 164–446)
PMV BLD AUTO: 9.2 FL (ref 9–12.9)
POTASSIUM SERPL-SCNC: 4.1 MMOL/L (ref 3.6–5.5)
PROT SERPL-MCNC: 7.3 G/DL (ref 6–8.2)
RBC # BLD AUTO: 4.71 M/UL (ref 4.2–5.4)
SODIUM SERPL-SCNC: 141 MMOL/L (ref 135–145)
TRIGL SERPL-MCNC: 132 MG/DL (ref 0–149)
TSH SERPL DL<=0.005 MIU/L-ACNC: 2.73 UIU/ML (ref 0.38–5.33)
WBC # BLD AUTO: 5.5 K/UL (ref 4.8–10.8)

## 2021-02-23 PROCEDURE — 80053 COMPREHEN METABOLIC PANEL: CPT

## 2021-02-23 PROCEDURE — 83036 HEMOGLOBIN GLYCOSYLATED A1C: CPT

## 2021-02-23 PROCEDURE — 80061 LIPID PANEL: CPT

## 2021-02-23 PROCEDURE — 84443 ASSAY THYROID STIM HORMONE: CPT

## 2021-02-23 PROCEDURE — 36415 COLL VENOUS BLD VENIPUNCTURE: CPT

## 2021-02-23 PROCEDURE — 85025 COMPLETE CBC W/AUTO DIFF WBC: CPT

## 2021-03-02 ASSESSMENT — ENCOUNTER SYMPTOMS
VOMITING: 0
CHILLS: 0
DIARRHEA: 0
SHORTNESS OF BREATH: 0
FEVER: 0
NAUSEA: 0
CONSTIPATION: 0
DEPRESSION: 0
WEAKNESS: 0
BLURRED VISION: 0
PALPITATIONS: 0

## 2021-03-02 NOTE — PROGRESS NOTES
History of Present Illness   65 year old female presents to clinic to establish care. She is using atorvastatin for dyslipidemia.  She denies any side effects, no myalgias.    She does have a history of trigeminal neuralgia, on her left side.  This is an intermittent problem, and comes approximately 3 times per year.  When it does come, the patient uses her gabapentin that she has.    She has a history of osteopenia, for which she took Fosamax for approximately 3 years.  Currently she is using weightbearing exercises and a multivitamin for treatment. She denies any falls or fractures this year.     She does have vitamin D deficiency and is taking supplementation for this.  Labs have normalized with the supplementation, but have not been checked for a couple of years.    She denies any other questions or concerns at this time.    ROS  Review of Systems   Constitutional: Negative for chills and fever.   HENT: Negative for hearing loss.    Eyes: Negative for blurred vision.   Respiratory: Negative for shortness of breath.    Cardiovascular: Negative for chest pain and palpitations.   Gastrointestinal: Negative for constipation, diarrhea, nausea and vomiting.   Genitourinary: Negative for dysuria and hematuria.   Skin: Negative for rash.   Neurological: Negative for weakness.   Psychiatric/Behavioral: Negative for depression.     Medications  Current Outpatient Medications   Medication Sig Dispense Refill   • atorvastatin (LIPITOR) 20 MG Tab Take 1 tablet by mouth every bedtime. 90 tablet 3   • gabapentin (NEURONTIN) 600 MG tablet TAKE 1 TABLET BY MOUTH THREE TIMES A  Tab 3   • Psyllium (METAMUCIL FIBER PO) Take  by mouth.     • Diclofenac Sodium (VOLTAREN) 1 % Gel Apply 2 g to affected area(s) 2 times a day as needed (back pain). 3 Tube 3   • fluticasone (FLONASE) 50 MCG/ACT nasal spray Spray 1 Spray in nose 2 times a day. 1 Bottle 5   • Polyethyl Glycol-Propyl Glycol (SYSTANE OP) 1 Drop by Ophthalmic route  every morning.     • Melatonin 5 MG Cap Take 5 mg by mouth at bedtime as needed.     • SUPER B COMPLEX/C PO Take 1 Tab by mouth every morning.     • Omega-3 Fatty Acids (OMEGA 3 PO) Take 1,500 mg by mouth every morning.       No current facility-administered medications for this visit.     Allergies  Allergies   Allergen Reactions   • Nkda [No Known Drug Allergy]      Problem List  Patient Active Problem List   Diagnosis   • Hyperlipidemia   • Osteopenia   • Vitamin D insufficiency   • Class 1 obesity due to excess calories without serious comorbidity with body mass index (BMI) of 32.0 to 32.9 in adult   • Trigeminal neuralgia pain   • Post-traumatic arthritis of left ankle   • Stress incontinence in female     Past Medical History  Past Medical History:   Diagnosis Date   • Anesthesia     hard to wake up   • Arthritis     left ankle   • Bilateral cataracts     johann IOL    • Fall at home, initial encounter 1/11/2019   • H/O: hysterectomy 6/5/2009   • High cholesterol    • Plantar fasciitis 6/5/2009   • Previous back surgery 6/5/2009     Past Surgical History  Past Surgical History:   Procedure Laterality Date   • BONE SPUR EXCISION Left 9/9/2019    Procedure: EXCISION, BONE SPUR- MEDIAL MALLEOLAR;  Surgeon: Rao Sarah M.D.;  Location: [facility];  Service: Orthopedics   • ANKLE ORIF Left 9/9/2019    Procedure: ORIF, ANKLE- DELTOID RECONSTRUCTION AND PLACEMENT OF MEDIAL MALLEOLAR SCREW;  Surgeon: Rao Sarah M.D.;  Location: [facility];  Service: Orthopedics   • ANKLE TOTAL ARTHROPLASTY Left 8/27/2018    Procedure: ANKLE TOTAL ARTHROPLASTY;  Surgeon: Rao Sarah M.D.;  Location: [facility];  Service: Orthopedics   • ANKLE ORIF Left 11/28/2017    left ankle x 3   • ORTHOPEDIC OSTEOTOMY Left 8/10/2016    Procedure: ORTHOPEDIC OSTEOTOMY - FOR ULNAR SHORTENING OSTEOTOMY;  Surgeon: Jerrell Linn M.D.;  Location: [facility];  Service:    •  "HYSTERECTOMY, VAGINAL  2009   • OTHER NEUROLOGICAL SURG  2003    back surgery   • CERVICAL LAMINECTOMY POSTERIOR     • HYSTERECTOMY LAPAROSCOPY     • OTHER      bilat cataracts removal     Past Family History  Family History   Problem Relation Age of Onset   • Diabetes Mother    • Cancer Father         LUNG   • Diabetes Sister    • Diabetes Brother    • No Known Problems Daughter    • No Known Problems Son    • No Known Problems Son    • No Known Problems Sister    • Diabetes Sister    • Asthma Sister      Social History  She reports eating a healthy and balanced diet, as well as getting regular exercise. She is currently retired, but previously worked as a  for [employer]. She drinks an average of 0-1 alcoholic beverages per week. She denies any current tobacco product or illicit drug use.  She is a former smoker, she quit in 1978, prior to that she smoked half a pack of cigarettes daily for a total of 6 years.  She is sexually active with one, male partner and they do not use any form of contraception.     Physical Exam  /68 (BP Location: Left arm, Patient Position: Sitting, BP Cuff Size: Adult)   Pulse 88   Temp 37 °C (98.6 °F) (Temporal)   Resp 14   Ht 1.676 m (5' 6\")   Wt 92 kg (202 lb 13.2 oz)   SpO2 96%   BMI 32.74 kg/m²   Physical Exam   Constitutional: She is well-developed, well-nourished, and in no distress. No distress.   HENT:   Head: Normocephalic and atraumatic.   Right Ear: Tympanic membrane, external ear and ear canal normal.   Left Ear: Tympanic membrane, external ear and ear canal normal.   Eyes: Pupils are equal, round, and reactive to light. Right eye exhibits no discharge. Left eye exhibits no discharge. No scleral icterus.   Neck: No thyromegaly present.   Cardiovascular: Normal rate, regular rhythm and normal heart sounds.   Pulmonary/Chest: Effort normal and breath sounds normal. No respiratory distress.   Abdominal: Soft. Bowel sounds are normal. She " exhibits no distension. There is no abdominal tenderness.   Musculoskeletal:         General: No edema.   Neurological: She is alert.   Skin: Skin is warm and dry. She is not diaphoretic.   Psychiatric: Affect and judgment normal.     Assessment & Plan  1. Pure hypercholesterolemia  Chronic and stable.  Continue Lipitor 20 g daily.  - atorvastatin (LIPITOR) 20 MG Tab; Take 1 tablet by mouth every bedtime.  Dispense: 90 tablet; Refill: 3  - Comp Metabolic Panel; Future  - Lipid Profile; Future    2. Trigeminal neuralgia pain  Chronic and stable.  Continue with gabapentin as needed for pain.    3. Osteopenia of multiple sites  4. Post-menopausal  Chronic and stable.  We will get updated DEXA scan.  Continue with vitamin D and multivitamin.  - DS-BONE DENSITY STUDY (DEXA); Future    5. Vitamin D insufficiency  Chronic and stable.  Continue with vitamin D supplementation.  We will get updated labs.  - VITAMIN D,25 HYDROXY; Future    6. Class 1 obesity due to excess calories without serious comorbidity with body mass index (BMI) of 32.0 to 32.9 in adult  Chronic problem.  We did discuss at length the recommended amount of weekly exercise, as well as healthy diet.    Return in about 1 year (around 3/4/2022) for Medicare annual wellness.    Deonna Paula M.D.

## 2021-03-03 DIAGNOSIS — Z23 NEED FOR VACCINATION: ICD-10-CM

## 2021-03-03 PROBLEM — R73.01 ELEVATED FASTING BLOOD SUGAR: Status: RESOLVED | Noted: 2017-02-22 | Resolved: 2021-03-03

## 2021-03-03 SDOH — ECONOMIC STABILITY: HOUSING INSECURITY
IN THE LAST 12 MONTHS, WAS THERE A TIME WHEN YOU DID NOT HAVE A STEADY PLACE TO SLEEP OR SLEPT IN A SHELTER (INCLUDING NOW)?: NO

## 2021-03-03 SDOH — ECONOMIC STABILITY: INCOME INSECURITY: IN THE LAST 12 MONTHS, WAS THERE A TIME WHEN YOU WERE NOT ABLE TO PAY THE MORTGAGE OR RENT ON TIME?: NO

## 2021-03-03 SDOH — HEALTH STABILITY: PHYSICAL HEALTH: ON AVERAGE, HOW MANY DAYS PER WEEK DO YOU ENGAGE IN MODERATE TO STRENUOUS EXERCISE (LIKE A BRISK WALK)?: 4 DAYS

## 2021-03-03 SDOH — ECONOMIC STABILITY: TRANSPORTATION INSECURITY
IN THE PAST 12 MONTHS, HAS THE LACK OF TRANSPORTATION KEPT YOU FROM MEDICAL APPOINTMENTS OR FROM GETTING MEDICATIONS?: NO

## 2021-03-03 SDOH — ECONOMIC STABILITY: HOUSING INSECURITY

## 2021-03-03 SDOH — HEALTH STABILITY: MENTAL HEALTH
STRESS IS WHEN SOMEONE FEELS TENSE, NERVOUS, ANXIOUS, OR CAN'T SLEEP AT NIGHT BECAUSE THEIR MIND IS TROUBLED. HOW STRESSED ARE YOU?: TO SOME EXTENT

## 2021-03-03 SDOH — HEALTH STABILITY: PHYSICAL HEALTH: ON AVERAGE, HOW MANY MINUTES DO YOU ENGAGE IN EXERCISE AT THIS LEVEL?: 50 MINUTES

## 2021-03-03 SDOH — ECONOMIC STABILITY: TRANSPORTATION INSECURITY
IN THE PAST 12 MONTHS, HAS LACK OF RELIABLE TRANSPORTATION KEPT YOU FROM MEDICAL APPOINTMENTS, MEETINGS, WORK OR FROM GETTING THINGS NEEDED FOR DAILY LIVING?: NO

## 2021-03-03 ASSESSMENT — SOCIAL DETERMINANTS OF HEALTH (SDOH)
IN A TYPICAL WEEK, HOW MANY TIMES DO YOU TALK ON THE PHONE WITH FAMILY, FRIENDS, OR NEIGHBORS?: MORE THAN THREE TIMES A WEEK
HOW OFTEN DO YOU ATTENT MEETINGS OF THE CLUB OR ORGANIZATION YOU BELONG TO?: NEVER
HOW OFTEN DO YOU HAVE SIX OR MORE DRINKS ON ONE OCCASION: NEVER
HOW OFTEN DO YOU ATTEND CHURCH OR RELIGIOUS SERVICES?: NEVER
WITHIN THE PAST 12 MONTHS, THE FOOD YOU BOUGHT JUST DIDN'T LAST AND YOU DIDN'T HAVE MONEY TO GET MORE: NEVER TRUE
DO YOU BELONG TO ANY CLUBS OR ORGANIZATIONS SUCH AS CHURCH GROUPS UNIONS, FRATERNAL OR ATHLETIC GROUPS, OR SCHOOL GROUPS?: NO
HOW MANY DRINKS CONTAINING ALCOHOL DO YOU HAVE ON A TYPICAL DAY WHEN YOU ARE DRINKING: 1 OR 2
WITHIN THE PAST 12 MONTHS, YOU WORRIED THAT YOUR FOOD WOULD RUN OUT BEFORE YOU GOT THE MONEY TO BUY MORE: NEVER TRUE
HOW OFTEN DO YOU HAVE A DRINK CONTAINING ALCOHOL: MONTHLY OR LESS
HOW HARD IS IT FOR YOU TO PAY FOR THE VERY BASICS LIKE FOOD, HOUSING, MEDICAL CARE, AND HEATING?: NOT HARD AT ALL
HOW OFTEN DO YOU GET TOGETHER WITH FRIENDS OR RELATIVES?: NEVER

## 2021-03-04 ENCOUNTER — OFFICE VISIT (OUTPATIENT)
Dept: MEDICAL GROUP | Facility: MEDICAL CENTER | Age: 66
End: 2021-03-04
Payer: MEDICARE

## 2021-03-04 VITALS
RESPIRATION RATE: 14 BRPM | TEMPERATURE: 98.6 F | DIASTOLIC BLOOD PRESSURE: 68 MMHG | HEART RATE: 88 BPM | WEIGHT: 202.82 LBS | OXYGEN SATURATION: 96 % | SYSTOLIC BLOOD PRESSURE: 112 MMHG | HEIGHT: 66 IN | BODY MASS INDEX: 32.6 KG/M2

## 2021-03-04 DIAGNOSIS — Z78.0 POST-MENOPAUSAL: ICD-10-CM

## 2021-03-04 DIAGNOSIS — M85.89 OSTEOPENIA OF MULTIPLE SITES: ICD-10-CM

## 2021-03-04 DIAGNOSIS — E66.09 CLASS 1 OBESITY DUE TO EXCESS CALORIES WITHOUT SERIOUS COMORBIDITY WITH BODY MASS INDEX (BMI) OF 32.0 TO 32.9 IN ADULT: ICD-10-CM

## 2021-03-04 DIAGNOSIS — E78.00 PURE HYPERCHOLESTEROLEMIA: Chronic | ICD-10-CM

## 2021-03-04 DIAGNOSIS — G50.0 TRIGEMINAL NEURALGIA: ICD-10-CM

## 2021-03-04 DIAGNOSIS — E55.9 VITAMIN D INSUFFICIENCY: Chronic | ICD-10-CM

## 2021-03-04 PROBLEM — M54.2 NECK PAIN ON RIGHT SIDE: Status: RESOLVED | Noted: 2019-07-30 | Resolved: 2021-03-04

## 2021-03-04 PROCEDURE — 99214 OFFICE O/P EST MOD 30 MIN: CPT | Performed by: FAMILY MEDICINE

## 2021-03-04 RX ORDER — ATORVASTATIN CALCIUM 20 MG/1
20 TABLET, FILM COATED ORAL
Qty: 90 TABLET | Refills: 3 | Status: SHIPPED | OUTPATIENT
Start: 2021-03-04 | End: 2022-03-11 | Stop reason: SDUPTHER

## 2021-03-04 ASSESSMENT — PATIENT HEALTH QUESTIONNAIRE - PHQ9: CLINICAL INTERPRETATION OF PHQ2 SCORE: 0

## 2021-03-04 ASSESSMENT — FIBROSIS 4 INDEX: FIB4 SCORE: 0.86

## 2021-03-11 ENCOUNTER — IMMUNIZATION (OUTPATIENT)
Dept: FAMILY PLANNING/WOMEN'S HEALTH CLINIC | Facility: IMMUNIZATION CENTER | Age: 66
End: 2021-03-11
Attending: INTERNAL MEDICINE
Payer: MEDICARE

## 2021-03-11 DIAGNOSIS — Z23 ENCOUNTER FOR VACCINATION: Primary | ICD-10-CM

## 2021-03-11 DIAGNOSIS — Z23 NEED FOR VACCINATION: ICD-10-CM

## 2021-03-11 PROCEDURE — 91300 PFIZER SARS-COV-2 VACCINE: CPT | Performed by: INTERNAL MEDICINE

## 2021-03-11 PROCEDURE — 0001A PFIZER SARS-COV-2 VACCINE: CPT | Performed by: INTERNAL MEDICINE

## 2021-03-23 ENCOUNTER — HOSPITAL ENCOUNTER (OUTPATIENT)
Dept: RADIOLOGY | Facility: MEDICAL CENTER | Age: 66
End: 2021-03-23
Attending: FAMILY MEDICINE
Payer: MEDICARE

## 2021-03-23 DIAGNOSIS — Z78.0 POST-MENOPAUSAL: ICD-10-CM

## 2021-03-23 DIAGNOSIS — M85.89 OSTEOPENIA OF MULTIPLE SITES: ICD-10-CM

## 2021-03-23 PROCEDURE — 77080 DXA BONE DENSITY AXIAL: CPT

## 2021-04-02 ENCOUNTER — IMMUNIZATION (OUTPATIENT)
Dept: FAMILY PLANNING/WOMEN'S HEALTH CLINIC | Facility: IMMUNIZATION CENTER | Age: 66
End: 2021-04-02
Attending: INTERNAL MEDICINE
Payer: MEDICARE

## 2021-04-02 DIAGNOSIS — Z23 ENCOUNTER FOR VACCINATION: Primary | ICD-10-CM

## 2021-04-02 PROCEDURE — 0002A PFIZER SARS-COV-2 VACCINE: CPT

## 2021-04-02 PROCEDURE — 91300 PFIZER SARS-COV-2 VACCINE: CPT

## 2021-07-22 ENCOUNTER — PATIENT MESSAGE (OUTPATIENT)
Dept: MEDICAL GROUP | Facility: MEDICAL CENTER | Age: 66
End: 2021-07-22

## 2021-07-22 DIAGNOSIS — J01.00 ACUTE NON-RECURRENT MAXILLARY SINUSITIS: ICD-10-CM

## 2021-07-22 RX ORDER — FLUTICASONE PROPIONATE 50 MCG
1 SPRAY, SUSPENSION (ML) NASAL 2 TIMES DAILY
Qty: 11.1 ML | Refills: 1 | Status: SHIPPED | OUTPATIENT
Start: 2021-07-22 | End: 2021-08-19

## 2021-08-19 DIAGNOSIS — J01.00 ACUTE NON-RECURRENT MAXILLARY SINUSITIS: ICD-10-CM

## 2021-08-19 RX ORDER — FLUTICASONE PROPIONATE 50 MCG
1 SPRAY, SUSPENSION (ML) NASAL 2 TIMES DAILY
Qty: 16 ML | Refills: 1 | Status: SHIPPED | OUTPATIENT
Start: 2021-08-19 | End: 2021-09-21

## 2021-09-21 DIAGNOSIS — J01.00 ACUTE NON-RECURRENT MAXILLARY SINUSITIS: ICD-10-CM

## 2021-09-21 RX ORDER — FLUTICASONE PROPIONATE 50 MCG
1 SPRAY, SUSPENSION (ML) NASAL 2 TIMES DAILY
Qty: 16 ML | Refills: 1 | Status: SHIPPED | OUTPATIENT
Start: 2021-09-21

## 2021-10-04 ENCOUNTER — OFFICE VISIT (OUTPATIENT)
Dept: URGENT CARE | Facility: CLINIC | Age: 66
End: 2021-10-04
Payer: MEDICARE

## 2021-10-04 ENCOUNTER — HOSPITAL ENCOUNTER (OUTPATIENT)
Facility: MEDICAL CENTER | Age: 66
End: 2021-10-04
Attending: NURSE PRACTITIONER
Payer: MEDICARE

## 2021-10-04 VITALS
WEIGHT: 200 LBS | HEIGHT: 66 IN | SYSTOLIC BLOOD PRESSURE: 130 MMHG | BODY MASS INDEX: 32.14 KG/M2 | OXYGEN SATURATION: 98 % | HEART RATE: 85 BPM | RESPIRATION RATE: 18 BRPM | TEMPERATURE: 99 F | DIASTOLIC BLOOD PRESSURE: 70 MMHG

## 2021-10-04 DIAGNOSIS — R39.9 LOWER URINARY TRACT SYMPTOMS (LUTS): Primary | ICD-10-CM

## 2021-10-04 DIAGNOSIS — N39.0 URINARY TRACT INFECTION WITHOUT HEMATURIA, SITE UNSPECIFIED: ICD-10-CM

## 2021-10-04 LAB
APPEARANCE UR: NORMAL
BILIRUB UR STRIP-MCNC: NORMAL MG/DL
COLOR UR AUTO: YELLOW
GLUCOSE UR STRIP.AUTO-MCNC: NORMAL MG/DL
KETONES UR STRIP.AUTO-MCNC: NORMAL MG/DL
LEUKOCYTE ESTERASE UR QL STRIP.AUTO: NORMAL
NITRITE UR QL STRIP.AUTO: NORMAL
PH UR STRIP.AUTO: 5.5 [PH] (ref 5–8)
PROT UR QL STRIP: NORMAL MG/DL
RBC UR QL AUTO: NORMAL
SP GR UR STRIP.AUTO: 1.03
UROBILINOGEN UR STRIP-MCNC: 0.2 MG/DL

## 2021-10-04 PROCEDURE — 87086 URINE CULTURE/COLONY COUNT: CPT

## 2021-10-04 PROCEDURE — 99213 OFFICE O/P EST LOW 20 MIN: CPT | Performed by: NURSE PRACTITIONER

## 2021-10-04 PROCEDURE — 81002 URINALYSIS NONAUTO W/O SCOPE: CPT | Performed by: NURSE PRACTITIONER

## 2021-10-04 RX ORDER — CEFDINIR 300 MG/1
300 CAPSULE ORAL 2 TIMES DAILY
Qty: 10 CAPSULE | Refills: 0 | Status: SHIPPED | OUTPATIENT
Start: 2021-10-04 | End: 2021-10-09

## 2021-10-04 RX ORDER — PHENAZOPYRIDINE HYDROCHLORIDE 200 MG/1
200 TABLET, FILM COATED ORAL 3 TIMES DAILY PRN
Qty: 6 TABLET | Refills: 0 | Status: SHIPPED | OUTPATIENT
Start: 2021-10-04 | End: 2022-02-17

## 2021-10-04 ASSESSMENT — ENCOUNTER SYMPTOMS
FEVER: 0
FLANK PAIN: 0
CHILLS: 0
ABDOMINAL PAIN: 1
MYALGIAS: 0
NAUSEA: 0
DIARRHEA: 1
HEADACHES: 0

## 2021-10-04 ASSESSMENT — FIBROSIS 4 INDEX: FIB4 SCORE: 0.88

## 2021-10-05 DIAGNOSIS — R39.9 LOWER URINARY TRACT SYMPTOMS (LUTS): ICD-10-CM

## 2021-10-05 NOTE — PROGRESS NOTES
Subjective     Uzma Gamez is a 66 y.o. female who presents with UTI (freq urination, burning)                HPI  New problem.  Patient is a 66-year-old female who presents with urinary frequency and burning with urination since earlier this morning.  She denies fever, chills, nausea but states did have diarrhea this morning.  She has lower abdominal pain with bloating as well.  She has not taken any medication for her symptoms.  Nkda [no known drug allergy]  Current Outpatient Medications on File Prior to Visit   Medication Sig Dispense Refill   • fluticasone (FLONASE) 50 MCG/ACT nasal spray ADMINISTER 1 SPRAY INTO AFFECTED NOSTRIL(S) 2 TIMES A DAY. 16 mL 1   • atorvastatin (LIPITOR) 20 MG Tab Take 1 tablet by mouth every bedtime. 90 tablet 3   • gabapentin (NEURONTIN) 600 MG tablet TAKE 1 TABLET BY MOUTH THREE TIMES A  Tab 3   • Psyllium (METAMUCIL FIBER PO) Take  by mouth.     • Diclofenac Sodium (VOLTAREN) 1 % Gel Apply 2 g to affected area(s) 2 times a day as needed (back pain). 3 Tube 3   • Polyethyl Glycol-Propyl Glycol (SYSTANE OP) 1 Drop by Ophthalmic route every morning.     • Melatonin 5 MG Cap Take 5 mg by mouth at bedtime as needed.     • SUPER B COMPLEX/C PO Take 1 Tab by mouth every morning.     • Omega-3 Fatty Acids (OMEGA 3 PO) Take 1,500 mg by mouth every morning.       No current facility-administered medications on file prior to visit.     Social History     Socioeconomic History   • Marital status:      Spouse name: Not on file   • Number of children: Not on file   • Years of education: Not on file   • Highest education level: Some college, no degree   Occupational History   • Not on file   Tobacco Use   • Smoking status: Former Smoker     Packs/day: 0.50     Years: 6.00     Pack years: 3.00     Types: Cigarettes     Quit date: 1978     Years since quittin.1   • Smokeless tobacco: Never Used   Vaping Use   • Vaping Use: Never used   Substance and Sexual Activity   •  Alcohol use: Yes     Alcohol/week: 0.0 oz     Comment: 2 per week    • Drug use: No   • Sexual activity: Yes     Partners: Male     Birth control/protection: Surgical     Comment:    Other Topics Concern   • Not on file   Social History Narrative   • Not on file     Social Determinants of Health     Financial Resource Strain: Low Risk    • Difficulty of Paying Living Expenses: Not hard at all   Food Insecurity: No Food Insecurity   • Worried About Running Out of Food in the Last Year: Never true   • Ran Out of Food in the Last Year: Never true   Transportation Needs: No Transportation Needs   • Lack of Transportation (Medical): No   • Lack of Transportation (Non-Medical): No   Physical Activity: Sufficiently Active   • Days of Exercise per Week: 4 days   • Minutes of Exercise per Session: 50 min   Stress: Stress Concern Present   • Feeling of Stress : To some extent   Social Connections: Moderately Isolated   • Frequency of Communication with Friends and Family: More than three times a week   • Frequency of Social Gatherings with Friends and Family: Never   • Attends Nondenominational Services: Never   • Active Member of Clubs or Organizations: No   • Attends Club or Organization Meetings: Never   • Marital Status:    Intimate Partner Violence:    • Fear of Current or Ex-Partner:    • Emotionally Abused:    • Physically Abused:    • Sexually Abused:      Breast Cancer-related family history is not on file.      Review of Systems   Constitutional: Negative for chills and fever.   Gastrointestinal: Positive for abdominal pain and diarrhea. Negative for nausea.   Genitourinary: Positive for dysuria and frequency. Negative for flank pain and hematuria.   Musculoskeletal: Negative for myalgias.   Neurological: Negative for headaches.              Objective     /70 (BP Location: Left arm, Patient Position: Sitting, BP Cuff Size: Adult)   Pulse 85   Temp 37.2 °C (99 °F) (Temporal)   Resp 18   Ht 1.676 m (5'  "6\")   Wt 90.7 kg (200 lb)   SpO2 98%   BMI 32.28 kg/m²      Physical Exam  Vitals reviewed.   Constitutional:       General: She is not in acute distress.     Appearance: She is well-developed.   Cardiovascular:      Rate and Rhythm: Normal rate and regular rhythm.      Heart sounds: Normal heart sounds. No murmur heard.     Pulmonary:      Effort: Pulmonary effort is normal. No respiratory distress.      Breath sounds: Normal breath sounds.   Abdominal:      General: Bowel sounds are normal.      Palpations: Abdomen is soft.      Tenderness: There is abdominal tenderness in the suprapubic area. There is no right CVA tenderness or left CVA tenderness.   Musculoskeletal:         General: Normal range of motion.      Comments: Moves all 4 extremities normally   Skin:     General: Skin is warm and dry.   Neurological:      Mental Status: She is alert and oriented to person, place, and time.   Psychiatric:         Behavior: Behavior normal.         Thought Content: Thought content normal.                             Assessment & Plan        1. Lower urinary tract symptoms (LUTS)  POCT Urinalysis    URINE CULTURE(NEW)   2. Urinary tract infection without hematuria, site unspecified  phenazopyridine (PYRIDIUM) 200 MG Tab    cefdinir (OMNICEF) 300 MG Cap     Differential diagnosis, natural history, supportive care, and indications for immediate follow-up discussed at length.              "

## 2021-10-07 LAB
BACTERIA UR CULT: NORMAL
SIGNIFICANT IND 70042: NORMAL
SITE SITE: NORMAL
SOURCE SOURCE: NORMAL

## 2021-11-10 ENCOUNTER — OFFICE VISIT (OUTPATIENT)
Dept: MEDICAL GROUP | Facility: MEDICAL CENTER | Age: 66
End: 2021-11-10
Payer: MEDICARE

## 2021-11-10 VITALS
TEMPERATURE: 98.4 F | OXYGEN SATURATION: 95 % | WEIGHT: 200.18 LBS | SYSTOLIC BLOOD PRESSURE: 110 MMHG | HEART RATE: 89 BPM | RESPIRATION RATE: 16 BRPM | HEIGHT: 66 IN | BODY MASS INDEX: 32.17 KG/M2 | DIASTOLIC BLOOD PRESSURE: 68 MMHG

## 2021-11-10 DIAGNOSIS — M85.89 OSTEOPENIA OF MULTIPLE SITES: ICD-10-CM

## 2021-11-10 DIAGNOSIS — Z12.31 ENCOUNTER FOR SCREENING MAMMOGRAM FOR MALIGNANT NEOPLASM OF BREAST: ICD-10-CM

## 2021-11-10 DIAGNOSIS — R09.82 POSTNASAL DRIP: ICD-10-CM

## 2021-11-10 DIAGNOSIS — G47.09 OTHER INSOMNIA: ICD-10-CM

## 2021-11-10 DIAGNOSIS — R73.03 PREDIABETES: ICD-10-CM

## 2021-11-10 DIAGNOSIS — E55.9 VITAMIN D INSUFFICIENCY: Chronic | ICD-10-CM

## 2021-11-10 DIAGNOSIS — R07.81 RIB PAIN ON LEFT SIDE: ICD-10-CM

## 2021-11-10 DIAGNOSIS — Z87.440 HISTORY OF RECURRENT UTIS: ICD-10-CM

## 2021-11-10 DIAGNOSIS — M19.172 POST-TRAUMATIC ARTHRITIS OF LEFT ANKLE: ICD-10-CM

## 2021-11-10 DIAGNOSIS — E78.00 PURE HYPERCHOLESTEROLEMIA: Chronic | ICD-10-CM

## 2021-11-10 DIAGNOSIS — M25.441 SWELLING OF FINGER JOINT OF RIGHT HAND: ICD-10-CM

## 2021-11-10 DIAGNOSIS — E66.09 CLASS 1 OBESITY DUE TO EXCESS CALORIES WITHOUT SERIOUS COMORBIDITY WITH BODY MASS INDEX (BMI) OF 32.0 TO 32.9 IN ADULT: ICD-10-CM

## 2021-11-10 DIAGNOSIS — G50.0 TRIGEMINAL NEURALGIA: ICD-10-CM

## 2021-11-10 PROCEDURE — 99214 OFFICE O/P EST MOD 30 MIN: CPT | Performed by: FAMILY MEDICINE

## 2021-11-10 RX ORDER — TRAZODONE HYDROCHLORIDE 50 MG/1
50 TABLET ORAL
Qty: 90 TABLET | Refills: 3 | Status: SHIPPED | OUTPATIENT
Start: 2021-11-10 | End: 2022-01-12

## 2021-11-10 ASSESSMENT — ENCOUNTER SYMPTOMS
COUGH: 1
BLOOD IN STOOL: 0
INSOMNIA: 1
PALPITATIONS: 0
FEVER: 0
WHEEZING: 0
CONSTIPATION: 0
DIARRHEA: 0
ABDOMINAL PAIN: 0
VOMITING: 0
SHORTNESS OF BREATH: 0

## 2021-11-10 ASSESSMENT — FIBROSIS 4 INDEX: FIB4 SCORE: 0.88

## 2021-11-10 NOTE — ASSESSMENT & PLAN NOTE
Acute onset health problem, no trauma, not suspecting any fracture.  Likely muscle strain, recommended to use Voltaren gel as needed.  If not getting better than we can do further evaluation.

## 2021-11-10 NOTE — ASSESSMENT & PLAN NOTE
Acute onset, no signs and symptoms of infection, likely allergies, recommended to use Flonase nasal spray for postnasal drip.

## 2021-11-10 NOTE — ASSESSMENT & PLAN NOTE
Chronic health problem, unstable, start trazodone 50 mg at bedtime.  Do sleep hygiene measures also.

## 2021-11-10 NOTE — ASSESSMENT & PLAN NOTE
Acute health problem, check x-ray to rule out arthritic changes.  Check blood work including for rheumatoid arthritis and gout.

## 2021-11-10 NOTE — PROGRESS NOTES
FAMILY MEDICINE VISIT                                                               Chief complaint::Diagnoses of Pure hypercholesterolemia, Osteopenia of multiple sites, Trigeminal neuralgia pain, Vitamin D insufficiency, Post-traumatic arthritis of left ankle, Swelling of finger joint of right hand, Postnasal drip, Other insomnia, History of recurrent UTIs, Class 1 obesity due to excess calories without serious comorbidity with body mass index (BMI) of 32.0 to 32.9 in adult, Prediabetes, Encounter for screening mammogram for malignant neoplasm of breast, and Rib pain on left side were pertinent to this visit.    History of present illness: Uzma Gamez is a 66 y.o. female who presented to establish care, joint swelling, nasal congestion, cough, left rib pain.    Problem   Other Insomnia    She reports history of sleep problems.  She denies any excessive snoring at night and she does not stop breathing while sleeping.  She tried melatonin and is not much helping with sleep.  She has not tried any prescription medication.       History of Recurrent Utis    She gets recurrent UTIs due to stress incontinence.  She uses Pyridium as needed for symptoms.     Prediabetes    Family history of diabetes, last A1c came back in normal range.    Lab Results   Component Value Date/Time    HBA1C 5.3 02/23/2021 09:45 AM         Swelling of Finger Joint of Right Hand    She is having swelling of her middle finger at PIP joint of right hand since few weeks.  She denies any excessive pain in the area.  She reports that she feels sometimes pins-and-needles sensation at that area.  Very mild redness also at that area.  She denies any trauma to her hand.  She gets stiffness in the morning which gets better during daytime.     Postnasal Drip    She reports that she has having nasal congestion and has been having cough due to accumulation of discharge at back of her throat.  These symptoms has been going on for the last 3 days she  denies any fever.  She reports that she has been feeling hot and cold.  Denies any respiratory distress.  Denies any exposure to Covid.  Got her Covid vaccination.     Rib Pain On Left Side    She has been having rib pain on left side since few weeks.  She denies any trauma to her side.  She denies any redness, swelling and bruising over that area.     Post-Traumatic Arthritis of Left Ankle    She has history of arthritic changes in left ankle.  She uses Voltaren gel as needed for symptoms     Trigeminal neuralgia pain    She has history of trigeminal neuralgia on left side and uses gabapentin as needed.  No side effects with medication.       Class 1 Obesity Due to Excess Calories Without Serious Comorbidity With Body Mass Index (Bmi) of 32.0 to 32.9 in Adult    She is eating healthy diet and is physically active.  BMI at 32.31     Vitamin D Insufficiency    Previous vitamin D level came back at 34 in 2018, she is currently taking vitamin D supplementation.  She has history of osteopenia.     Osteopenia    Previously treated with Fosamax in 2017 for fragility fracture of ankle.  Recent bone density scan in 03/ 2021 showed When compared with the most recent study dated 11/2/2015, there has been a 2.5% increase in the bone mineral density of the lumbar spine and a 0.5% increase in the bone mineral density of the left femur.   According to the World Health Organization classification, bone mineral density of this patient is osteopenia with increased fracture risk for the left femur and lumbar spine.  She is taking calcium and vitamin D supplementation.     Hyperlipidemia    She is currently taking atorvastatin 20 mg daily.  No side effects with medication, last lipid panel came back in normal range.    Lab Results   Component Value Date/Time    CHOLSTRLTOT 155 02/23/2021 0945    TRIGLYCERIDE 132 02/23/2021 0945    HDL 83 02/23/2021 0945    LDL 46 02/23/2021 0945    CHOLHDLRAT 3.3 02/04/2013 1030            Review of  "systems:     Review of Systems   Constitutional: Negative for fever and malaise/fatigue.   HENT: Positive for congestion.    Respiratory: Positive for cough. Negative for shortness of breath and wheezing.    Cardiovascular: Negative for chest pain, palpitations and leg swelling.   Gastrointestinal: Negative for abdominal pain, blood in stool, constipation, diarrhea and vomiting.   Musculoskeletal:        Left-sided rib pain  Right middle finger swelling and stiffness   Skin: Negative for rash.   Psychiatric/Behavioral: The patient has insomnia.         Medications and Allergies:     Current Outpatient Medications   Medication Sig Dispense Refill   • traZODone (DESYREL) 50 MG Tab Take 1 Tablet by mouth at bedtime. 90 Tablet 3   • phenazopyridine (PYRIDIUM) 200 MG Tab Take 1 Tablet by mouth 3 times a day as needed. 6 Tablet 0   • fluticasone (FLONASE) 50 MCG/ACT nasal spray ADMINISTER 1 SPRAY INTO AFFECTED NOSTRIL(S) 2 TIMES A DAY. 16 mL 1   • atorvastatin (LIPITOR) 20 MG Tab Take 1 tablet by mouth every bedtime. 90 tablet 3   • gabapentin (NEURONTIN) 600 MG tablet TAKE 1 TABLET BY MOUTH THREE TIMES A  Tab 3   • Psyllium (METAMUCIL FIBER PO) Take  by mouth.     • Diclofenac Sodium (VOLTAREN) 1 % Gel Apply 2 g to affected area(s) 2 times a day as needed (back pain). 3 Tube 3   • Polyethyl Glycol-Propyl Glycol (SYSTANE OP) 1 Drop by Ophthalmic route every morning.     • Melatonin 5 MG Cap Take 5 mg by mouth at bedtime as needed.     • SUPER B COMPLEX/C PO Take 1 Tab by mouth every morning.     • Omega-3 Fatty Acids (OMEGA 3 PO) Take 1,500 mg by mouth every morning.       No current facility-administered medications for this visit.          Vitals:    /68 (BP Location: Left arm, Patient Position: Sitting, BP Cuff Size: Adult)   Pulse 89   Temp 36.9 °C (98.4 °F) (Temporal)   Resp 16   Ht 1.676 m (5' 6\")   Wt 90.8 kg (200 lb 2.8 oz)   SpO2 95%  Body mass index is 32.31 kg/m².    Physical Exam: "     Physical Exam  Constitutional:       General: She is not in acute distress.  HENT:      Head: Normocephalic and atraumatic.      Right Ear: Tympanic membrane, ear canal and external ear normal. There is no impacted cerumen.      Left Ear: Tympanic membrane, ear canal and external ear normal. There is no impacted cerumen.      Mouth/Throat:      Mouth: Mucous membranes are moist.      Pharynx: Oropharynx is clear. No oropharyngeal exudate or posterior oropharyngeal erythema.   Eyes:      Conjunctiva/sclera: Conjunctivae normal.   Cardiovascular:      Rate and Rhythm: Normal rate and regular rhythm.      Heart sounds: Normal heart sounds. No murmur heard.  No friction rub. No gallop.    Pulmonary:      Effort: Pulmonary effort is normal. No respiratory distress.      Breath sounds: Normal breath sounds. No wheezing or rales.   Musculoskeletal:         General: No deformity.      Cervical back: Neck supple.      Comments: Tenderness on palpation at left rib area on left side, no swelling.    Swelling over PIP joint of middle finger of right hand.  Very mild redness.  Full range of motion at fingers.   Skin:     Findings: No rash.   Neurological:      Mental Status: She is alert.      Gait: Gait is intact.   Psychiatric:         Mood and Affect: Mood and affect normal.         Judgment: Judgment normal.            Assessment/Plan:    Problem List Items Addressed This Visit     Class 1 obesity due to excess calories without serious comorbidity with body mass index (BMI) of 32.0 to 32.9 in adult     Recommended to continue eating healthy diet and aerobic exercise.         Relevant Orders    Patient identified as having weight management issue.  Appropriate orders and counseling given.    History of recurrent UTIs     Chronic health problem, stable, continue Pyridium as needed for symptoms.         Hyperlipidemia (Chronic)     Chronic health problem, stable, continue atorvastatin 40 mg daily.  Recheck lipid panel.          Relevant Orders    Comp Metabolic Panel    Lipid Profile    Osteopenia    Relevant Orders    VITAMIN D,25 HYDROXY    Other insomnia     Chronic health problem, unstable, start trazodone 50 mg at bedtime.  Do sleep hygiene measures also.         Relevant Medications    traZODone (DESYREL) 50 MG Tab    Post-traumatic arthritis of left ankle     Chronic health problem, stable, continue Voltaren gel as needed.         Postnasal drip     Acute onset, no signs and symptoms of infection, likely allergies, recommended to use Flonase nasal spray for postnasal drip.         Prediabetes     Chronic health problem, stable, recheck A1c to assess control.         Relevant Orders    HEMOGLOBIN A1C    Rib pain on left side     Acute onset health problem, no trauma, not suspecting any fracture.  Likely muscle strain, recommended to use Voltaren gel as needed.  If not getting better than we can do further evaluation.         Swelling of finger joint of right hand     Acute health problem, check x-ray to rule out arthritic changes.  Check blood work including for rheumatoid arthritis and gout.         Relevant Orders    DX-FINGER(S) 2+ RIGHT    RHEUMATOID ARTHRITIS FACTOR    CCP ANTIBODY    CBC WITH DIFFERENTIAL    Sed Rate    CRP QUANTITIVE (NON-CARDIAC)    ANTI-NUCLEAR ANTIBODY SERUM    URIC ACID    Trigeminal neuralgia pain     Chronic health problem, stable, continue gabapentin as needed.         Relevant Medications    traZODone (DESYREL) 50 MG Tab    Vitamin D insufficiency (Chronic)     Chronic health problem, continue vitamin D supplementation.  Recheck vitamin D level           Other Visit Diagnoses     Encounter for screening mammogram for malignant neoplasm of breast        Relevant Orders    MA-SCREENING MAMMO BILAT W/TOMOSYNTHESIS W/CAD           Please note that this dictation was created using voice recognition software. I have made every reasonable attempt to correct obvious errors, but I expect that there are  errors of grammar and possibly content that I did not discover before finalizing the note.    Follow up in 2 months for annual wellness visit and lab follow-up.

## 2021-12-28 ENCOUNTER — HOSPITAL ENCOUNTER (OUTPATIENT)
Dept: RADIOLOGY | Facility: MEDICAL CENTER | Age: 66
End: 2021-12-28
Attending: FAMILY MEDICINE
Payer: MEDICARE

## 2021-12-28 ENCOUNTER — PATIENT MESSAGE (OUTPATIENT)
Dept: MEDICAL GROUP | Facility: MEDICAL CENTER | Age: 66
End: 2021-12-28

## 2021-12-28 ENCOUNTER — HOSPITAL ENCOUNTER (OUTPATIENT)
Dept: LAB | Facility: MEDICAL CENTER | Age: 66
End: 2021-12-28
Attending: FAMILY MEDICINE
Payer: MEDICARE

## 2021-12-28 DIAGNOSIS — Z12.31 ENCOUNTER FOR SCREENING MAMMOGRAM FOR MALIGNANT NEOPLASM OF BREAST: ICD-10-CM

## 2021-12-28 DIAGNOSIS — M25.441 SWELLING OF FINGER JOINT OF RIGHT HAND: ICD-10-CM

## 2021-12-28 DIAGNOSIS — E78.00 PURE HYPERCHOLESTEROLEMIA: Chronic | ICD-10-CM

## 2021-12-28 DIAGNOSIS — R73.03 PREDIABETES: ICD-10-CM

## 2021-12-28 DIAGNOSIS — M85.89 OSTEOPENIA OF MULTIPLE SITES: ICD-10-CM

## 2021-12-28 LAB
25(OH)D3 SERPL-MCNC: 43 NG/ML (ref 30–100)
ALBUMIN SERPL BCP-MCNC: 4.3 G/DL (ref 3.2–4.9)
ALBUMIN/GLOB SERPL: 1.3 G/DL
ALP SERPL-CCNC: 96 U/L (ref 30–99)
ALT SERPL-CCNC: 14 U/L (ref 2–50)
ANION GAP SERPL CALC-SCNC: 11 MMOL/L (ref 7–16)
AST SERPL-CCNC: 16 U/L (ref 12–45)
BASOPHILS # BLD AUTO: 0.7 % (ref 0–1.8)
BASOPHILS # BLD: 0.05 K/UL (ref 0–0.12)
BILIRUB SERPL-MCNC: 0.6 MG/DL (ref 0.1–1.5)
BUN SERPL-MCNC: 21 MG/DL (ref 8–22)
CALCIUM SERPL-MCNC: 9.4 MG/DL (ref 8.5–10.5)
CHLORIDE SERPL-SCNC: 103 MMOL/L (ref 96–112)
CHOLEST SERPL-MCNC: 180 MG/DL (ref 100–199)
CO2 SERPL-SCNC: 25 MMOL/L (ref 20–33)
CREAT SERPL-MCNC: 0.62 MG/DL (ref 0.5–1.4)
CRP SERPL HS-MCNC: <0.3 MG/DL (ref 0–0.75)
EOSINOPHIL # BLD AUTO: 0.32 K/UL (ref 0–0.51)
EOSINOPHIL NFR BLD: 4.7 % (ref 0–6.9)
ERYTHROCYTE [DISTWIDTH] IN BLOOD BY AUTOMATED COUNT: 41.3 FL (ref 35.9–50)
ERYTHROCYTE [SEDIMENTATION RATE] IN BLOOD BY WESTERGREN METHOD: 7 MM/HOUR (ref 0–25)
GLOBULIN SER CALC-MCNC: 3.4 G/DL (ref 1.9–3.5)
GLUCOSE SERPL-MCNC: 88 MG/DL (ref 65–99)
HCT VFR BLD AUTO: 47 % (ref 37–47)
HDLC SERPL-MCNC: 82 MG/DL
HGB BLD-MCNC: 15.6 G/DL (ref 12–16)
IMM GRANULOCYTES # BLD AUTO: 0.03 K/UL (ref 0–0.11)
IMM GRANULOCYTES NFR BLD AUTO: 0.4 % (ref 0–0.9)
LDLC SERPL CALC-MCNC: 71 MG/DL
LYMPHOCYTES # BLD AUTO: 2.3 K/UL (ref 1–4.8)
LYMPHOCYTES NFR BLD: 33.4 % (ref 22–41)
MCH RBC QN AUTO: 31.9 PG (ref 27–33)
MCHC RBC AUTO-ENTMCNC: 33.2 G/DL (ref 33.6–35)
MCV RBC AUTO: 96.1 FL (ref 81.4–97.8)
MONOCYTES # BLD AUTO: 0.68 K/UL (ref 0–0.85)
MONOCYTES NFR BLD AUTO: 9.9 % (ref 0–13.4)
NEUTROPHILS # BLD AUTO: 3.5 K/UL (ref 2–7.15)
NEUTROPHILS NFR BLD: 50.9 % (ref 44–72)
NRBC # BLD AUTO: 0 K/UL
NRBC BLD-RTO: 0 /100 WBC
PLATELET # BLD AUTO: 352 K/UL (ref 164–446)
PMV BLD AUTO: 10.1 FL (ref 9–12.9)
POTASSIUM SERPL-SCNC: 4.2 MMOL/L (ref 3.6–5.5)
PROT SERPL-MCNC: 7.7 G/DL (ref 6–8.2)
RBC # BLD AUTO: 4.89 M/UL (ref 4.2–5.4)
RHEUMATOID FACT SER IA-ACNC: 10 IU/ML (ref 0–14)
SODIUM SERPL-SCNC: 139 MMOL/L (ref 135–145)
TRIGL SERPL-MCNC: 134 MG/DL (ref 0–149)
URATE SERPL-MCNC: 6.1 MG/DL (ref 1.9–8.2)
WBC # BLD AUTO: 6.9 K/UL (ref 4.8–10.8)

## 2021-12-28 PROCEDURE — 86038 ANTINUCLEAR ANTIBODIES: CPT

## 2021-12-28 PROCEDURE — 86431 RHEUMATOID FACTOR QUANT: CPT

## 2021-12-28 PROCEDURE — 84550 ASSAY OF BLOOD/URIC ACID: CPT

## 2021-12-28 PROCEDURE — 85025 COMPLETE CBC W/AUTO DIFF WBC: CPT

## 2021-12-28 PROCEDURE — 85652 RBC SED RATE AUTOMATED: CPT

## 2021-12-28 PROCEDURE — 83036 HEMOGLOBIN GLYCOSYLATED A1C: CPT

## 2021-12-28 PROCEDURE — 82306 VITAMIN D 25 HYDROXY: CPT

## 2021-12-28 PROCEDURE — 86039 ANTINUCLEAR ANTIBODIES (ANA): CPT

## 2021-12-28 PROCEDURE — 73140 X-RAY EXAM OF FINGER(S): CPT | Mod: RT

## 2021-12-28 PROCEDURE — 86200 CCP ANTIBODY: CPT

## 2021-12-28 PROCEDURE — 80053 COMPREHEN METABOLIC PANEL: CPT

## 2021-12-28 PROCEDURE — 80061 LIPID PANEL: CPT

## 2021-12-28 PROCEDURE — 36415 COLL VENOUS BLD VENIPUNCTURE: CPT

## 2021-12-28 PROCEDURE — 86140 C-REACTIVE PROTEIN: CPT

## 2021-12-29 ENCOUNTER — TELEPHONE (OUTPATIENT)
Dept: MEDICAL GROUP | Facility: MEDICAL CENTER | Age: 66
End: 2021-12-29

## 2021-12-29 DIAGNOSIS — E55.9 VITAMIN D DEFICIENCY: ICD-10-CM

## 2021-12-29 LAB
EST. AVERAGE GLUCOSE BLD GHB EST-MCNC: 108 MG/DL
HBA1C MFR BLD: 5.4 % (ref 4–5.6)

## 2021-12-29 NOTE — PROGRESS NOTES
Called patient and explained to her regarding screening and diagnostic mammogram.  We will do exam on 1/12/2022 and will order diagnostic mammogram based on exam findings.

## 2021-12-29 NOTE — TELEPHONE ENCOUNTER
----- Message from Uzma Gamez sent at 12/28/2021  4:11 PM PST -----  Regarding: Mammogram  I went to [address] [place] [address] this morning for the mammogram order which you ordered.    The medical staff explained that the procedure ordered needed to be reissued because of the location of the sensitivity and pain.    They suggested  a diagnostic bilateral mammogram with breast ultrasound for pain left breast.    When the revised order is issued, the staff at [address] [place] [address] will call me to schedule the appointment    Thank you for your attention and assistance.    Uzma Gamez  [phone]

## 2021-12-29 NOTE — TELEPHONE ENCOUNTER
----- Message from Uzma Gamez sent at 12/28/2021  4:19 PM PST -----  Regarding: Blood Test Issue  For the Vitamin D 25-Hydroxy, the technician drawing the blood stated that Medicare would not participate in the payment of the test (estimated cost $252).    Is there another descriptive diagnosis that would enable Medicare participation for the test?    Thank you for considering this request.    Uzma Gamez  [phone]

## 2021-12-29 NOTE — TELEPHONE ENCOUNTER
Please call lab to update billing for vitamin D lab.  Please provide him diagnosis of vitamin D deficiency, ICD code E 55.9

## 2021-12-30 LAB
CCP IGG SERPL-ACNC: 4 UNITS (ref 0–19)
NUCLEAR IGG SER QL IA: DETECTED

## 2021-12-30 NOTE — TELEPHONE ENCOUNTER
Maribell at [facility] changed the ICD code and it cleared with medicare. Stated she will call the billing and update them on the information.

## 2021-12-31 LAB
ANA INTERPRETIVE COMMENT Q5143: ABNORMAL
ANA PATTERN Q5144: ABNORMAL
ANA TITER Q5145: ABNORMAL
ANTINUCLEAR ANTIBODY (ANA), HEP-2, IGG Q5142: DETECTED

## 2022-01-12 ENCOUNTER — OFFICE VISIT (OUTPATIENT)
Dept: MEDICAL GROUP | Facility: MEDICAL CENTER | Age: 67
End: 2022-01-12
Payer: MEDICARE

## 2022-01-12 VITALS
HEIGHT: 66 IN | SYSTOLIC BLOOD PRESSURE: 110 MMHG | RESPIRATION RATE: 16 BRPM | DIASTOLIC BLOOD PRESSURE: 68 MMHG | HEART RATE: 92 BPM | OXYGEN SATURATION: 97 % | TEMPERATURE: 98.2 F | BODY MASS INDEX: 33.02 KG/M2 | WEIGHT: 205.47 LBS

## 2022-01-12 DIAGNOSIS — R76.8 ANA POSITIVE: ICD-10-CM

## 2022-01-12 DIAGNOSIS — G50.0 TRIGEMINAL NEURALGIA: ICD-10-CM

## 2022-01-12 DIAGNOSIS — G47.09 OTHER INSOMNIA: ICD-10-CM

## 2022-01-12 DIAGNOSIS — R09.82 POSTNASAL DRIP: ICD-10-CM

## 2022-01-12 DIAGNOSIS — E55.9 VITAMIN D INSUFFICIENCY: Chronic | ICD-10-CM

## 2022-01-12 DIAGNOSIS — N39.3 STRESS INCONTINENCE IN FEMALE: ICD-10-CM

## 2022-01-12 DIAGNOSIS — Z87.440 HISTORY OF RECURRENT UTIS: ICD-10-CM

## 2022-01-12 DIAGNOSIS — E78.00 PURE HYPERCHOLESTEROLEMIA: Chronic | ICD-10-CM

## 2022-01-12 DIAGNOSIS — N39.46 MIXED STRESS AND URGE URINARY INCONTINENCE: ICD-10-CM

## 2022-01-12 DIAGNOSIS — R73.03 PREDIABETES: ICD-10-CM

## 2022-01-12 DIAGNOSIS — E66.09 CLASS 1 OBESITY DUE TO EXCESS CALORIES WITHOUT SERIOUS COMORBIDITY WITH BODY MASS INDEX (BMI) OF 32.0 TO 32.9 IN ADULT: ICD-10-CM

## 2022-01-12 DIAGNOSIS — Z00.00 MEDICARE ANNUAL WELLNESS VISIT, SUBSEQUENT: ICD-10-CM

## 2022-01-12 DIAGNOSIS — N64.4 BREAST PAIN: ICD-10-CM

## 2022-01-12 DIAGNOSIS — M25.441 SWELLING OF FINGER JOINT OF RIGHT HAND: ICD-10-CM

## 2022-01-12 DIAGNOSIS — M19.172 POST-TRAUMATIC ARTHRITIS OF LEFT ANKLE: ICD-10-CM

## 2022-01-12 DIAGNOSIS — M85.89 OSTEOPENIA OF MULTIPLE SITES: ICD-10-CM

## 2022-01-12 DIAGNOSIS — R07.81 RIB PAIN ON LEFT SIDE: ICD-10-CM

## 2022-01-12 PROCEDURE — G0439 PPPS, SUBSEQ VISIT: HCPCS | Performed by: FAMILY MEDICINE

## 2022-01-12 RX ORDER — OXYBUTYNIN CHLORIDE 10 MG/1
10 TABLET, EXTENDED RELEASE ORAL DAILY
Qty: 30 TABLET | Refills: 1 | Status: SHIPPED | OUTPATIENT
Start: 2022-01-12 | End: 2022-03-03

## 2022-01-12 RX ORDER — OXYBUTYNIN CHLORIDE 10 MG/1
10 TABLET, EXTENDED RELEASE ORAL DAILY
Qty: 30 TABLET | Refills: 1 | Status: SHIPPED | OUTPATIENT
Start: 2022-01-12 | End: 2022-01-12 | Stop reason: SDUPTHER

## 2022-01-12 ASSESSMENT — FIBROSIS 4 INDEX: FIB4 SCORE: 0.81

## 2022-01-12 ASSESSMENT — ENCOUNTER SYMPTOMS: GENERAL WELL-BEING: GOOD

## 2022-01-12 ASSESSMENT — PATIENT HEALTH QUESTIONNAIRE - PHQ9: CLINICAL INTERPRETATION OF PHQ2 SCORE: 0

## 2022-01-12 ASSESSMENT — ACTIVITIES OF DAILY LIVING (ADL): BATHING_REQUIRES_ASSISTANCE: 0

## 2022-01-12 NOTE — PROGRESS NOTES
Chief Complaint   Patient presents with   • Medicare Annual Wellness   • Enuresis         HPI:  Uzma is a 66 y.o. here for Medicare Annual Wellness Visit        Patient Active Problem List    Diagnosis Date Noted   • Other insomnia 11/10/2021   • History of recurrent UTIs 11/10/2021   • Prediabetes 11/10/2021   • Swelling of finger joint of right hand 11/10/2021   • Postnasal drip 11/10/2021   • Rib pain on left side 11/10/2021   • Stress incontinence in female 03/02/2020   • Post-traumatic arthritis of left ankle 08/27/2018   • Trigeminal neuralgia pain 02/23/2018   • Class 1 obesity due to excess calories without serious comorbidity with body mass index (BMI) of 32.0 to 32.9 in adult 11/22/2017   • Vitamin D insufficiency 02/12/2016   • Osteopenia 10/12/2015   • Hyperlipidemia 03/26/2014       Current Outpatient Medications   Medication Sig Dispense Refill   • oxybutynin SR (DITROPAN-XL) 10 MG CR tablet Take 1 Tablet by mouth every day. 30 Tablet 1   • phenazopyridine (PYRIDIUM) 200 MG Tab Take 1 Tablet by mouth 3 times a day as needed. 6 Tablet 0   • fluticasone (FLONASE) 50 MCG/ACT nasal spray ADMINISTER 1 SPRAY INTO AFFECTED NOSTRIL(S) 2 TIMES A DAY. 16 mL 1   • atorvastatin (LIPITOR) 20 MG Tab Take 1 tablet by mouth every bedtime. 90 tablet 3   • gabapentin (NEURONTIN) 600 MG tablet TAKE 1 TABLET BY MOUTH THREE TIMES A  Tab 3   • Psyllium (METAMUCIL FIBER PO) Take  by mouth.     • Diclofenac Sodium (VOLTAREN) 1 % Gel Apply 2 g to affected area(s) 2 times a day as needed (back pain). 3 Tube 3   • Polyethyl Glycol-Propyl Glycol (SYSTANE OP) 1 Drop by Ophthalmic route every morning.     • Melatonin 5 MG Cap Take 5 mg by mouth at bedtime as needed.     • SUPER B COMPLEX/C PO Take 1 Tab by mouth every morning.       No current facility-administered medications for this visit.        Patient is taking medications as noted in medication list.  Current supplements as per medication list.     Allergies: Nkda  [no known drug allergy]    Current social contact/activities: Enjoys to walk    Is patient current with immunizations? Yes.    She  reports that she quit smoking about 43 years ago. Her smoking use included cigarettes. She has a 3.00 pack-year smoking history. She has never used smokeless tobacco. She reports current alcohol use. She reports that she does not use drugs.  Counseling given: Not Answered        DPA/Advanced directive: Patient does not have an Advanced Directive.  She is working on getting her advanced directives    ROS:    Gait: Uses no assistive device   Ostomy: No   Other tubes: No   Amputations: No   Chronic oxygen use No   Last eye exam was a year ago, scheduled for eye exam in February  Wears hearing aids: No   : Reports urinary leakage during the last 6 months that has interfered a lot with their daily activities or sleep.      Screening:        Depression Screening    Little interest or pleasure in doing things?  0 - not at all  Feeling down, depressed, or hopeless? 0 - not at all  Patient Health Questionnaire Score: 0    If depressive symptoms identified deferred to follow up visit unless specifically addressed in assessment and plan.    Interpretation of PHQ-9 Total Score   Score Severity   1-4 No Depression   5-9 Mild Depression   10-14 Moderate Depression   15-19 Moderately Severe Depression   20-27 Severe Depression    Screening for Cognitive Impairment    Three Minute Recall (captain, garden, picture)  3/3    [place] clock face with all 12 numbers and set the hands to show 5 past 8.  Yes    If cognitive concerns identified, deferred for follow up unless specifically addressed in assessment and plan.    Fall Risk Assessment    Has the patient had two or more falls in the last year or any fall with injury in the last year?  No  If fall risk identified, deferred for follow up unless specifically addressed in assessment and plan.    Safety Assessment    Throw rugs on floor.  No  Handrails on  all stairs.  Yes  Good lighting in all hallways.  Yes  Difficulty hearing.  No  Patient counseled about all safety risks that were identified.    Functional Assessment ADLs    Are there any barriers preventing you from cooking for yourself or meeting nutritional needs?  No.    Are there any barriers preventing you from driving safely or obtaining transportation?  No.    Are there any barriers preventing you from using a telephone or calling for help?  No.    Are there any barriers preventing you from shopping?  No.    Are there any barriers preventing you from taking care of your own finances?  No.    Are there any barriers preventing you from managing your medications?  No.    Are there any barriers preventing you from showering, bathing or dressing yourself?  No.    Are you currently engaging in any exercise or physical activity?  Yes.     What is your perception of your health?  Good.    Health Maintenance Summary          Annual Wellness Visit (Every 366 Days) Next due on 1/13/2023 01/12/2022  Visit Dx: Medicare annual wellness visit, subsequent    03/02/2020  Visit Dx: Medicare annual wellness visit, subsequent    03/01/2019  Visit Dx: Medicare annual wellness visit, initial          IMM DTaP/Tdap/Td Vaccine (2 - Td or Tdap) Next due on 10/12/2025    10/12/2015  Imm Admin: Tdap Vaccine          BONE DENSITY (Every 5 Years) Tentatively due on 3/23/2026    03/23/2021  DS-BONE DENSITY STUDY (DEXA)    11/02/2015  DS-BONE DENSITY STUDY (DEXA)    12/18/2012  DS-BONE DENSITY STUDY (DEXA)    06/05/2009  DS-BONE DENSITY STUDY (DEXA)          COLORECTAL CANCER SCREENING (COLONOSCOPY - Every 10 Years) Tentatively due on 12/12/2028 12/12/2018  REFERRAL TO GI FOR COLONOSCOPY    02/01/2013  OCCULT BLOOD FECES IMMUNOASSAY    12/06/2007  COLONOSCOPY (Done)          IMM HEP B VACCINE (Series Information) Aged Out    09/26/2016  Imm Admin: Hepatitis B Vaccine (Adol/Adult)    12/11/2015  Imm Admin: Hepatitis B Vaccine  (Adol/Adult)    10/12/2015  Imm Admin: Hepatitis B Vaccine (Adol/Adult)          HEPATITIS C SCREENING  Completed    02/20/2018  HEP C VIRUS ANTIBODY          IMM PNEUMOCOCCAL VACCINE: 65+ Years (Series Information) Completed    08/18/2020  Imm Admin: Pneumococcal polysaccharide vaccine (PPSV-23)    03/22/2019  Imm Admin: Pneumococcal Conjugate Vaccine (Prevnar/PCV-13)          IMM ZOSTER VACCINES (Series Information) Completed    09/08/2020  Imm Admin: Zoster Vaccine Recombinant (RZV) (SHINGRIX)    07/03/2020  Imm Admin: Zoster Vaccine Recombinant (RZV) (SHINGRIX)    09/24/2014  Imm Admin: Zoster Vaccine Live (ZVL) (Zostavax) - HISTORICAL DATA          IMM INFLUENZA (Series Information) Completed    11/02/2021  Imm Admin: Influenza Vaccine Adult HD    09/08/2020  Imm Admin: Influenza Vaccine Quad Inj (Pf)    09/19/2019  Imm Admin: Influenza Vaccine Adult HD    09/13/2018  Imm Admin: Influenza Vaccine Quad Inj (Pf)    09/20/2017  Imm Admin: Influenza Vaccine Quad Inj (Pf)    Only the first 5 history entries have been loaded, but more history exists.          COVID-19 Vaccine (Series Information) Completed    11/02/2021  Imm Admin: Pfizer SARS-CoV-2 Vaccine 12+    04/02/2021  Imm Admin: Pfizer SARS-CoV-2 Vaccine    03/11/2021  Imm Admin: Pfizer SARS-CoV-2 Vaccine          IMM MENINGOCOCCAL VACCINE (MCV4) (Series Information) Aged Out    No completion history exists for this topic.          Discontinued - MAMMOGRAM  Ordered on 1/12/2022 12/26/2019  MA-SCREENING MAMMO BILAT W/TOMOSYNTHESIS W/CAD    11/30/2018  MA-SCREEN MAMMO W/CAD-BILAT    04/20/2017  MA-SCREEN MAMMO W/CAD-BILAT    03/08/2016  MA-SCREEN MAMMO W/CAD-BILAT    04/09/2014  MA-SCREEING MAMMOGRAM W/ CAD    Only the first 5 history entries have been loaded, but more history exists.          Discontinued - PAP SMEAR  Discontinued    09/08/2010  PAP IMAGE GUIDED LIQUID BASED, HPV-HR                Patient Care Team:  Sincere Zaldivar M.D. as PCP - General  (Family Medicine)  Loida Bryan M.D. as Consulting Physician (Obstetrics & Gynecology)    Social History     Tobacco Use   • Smoking status: Former Smoker     Packs/day: 0.50     Years: 6.00     Pack years: 3.00     Types: Cigarettes     Quit date: 1978     Years since quittin.3   • Smokeless tobacco: Never Used   Vaping Use   • Vaping Use: Never used   Substance Use Topics   • Alcohol use: Yes     Alcohol/week: 0.0 oz     Comment: 2 per week    • Drug use: No     Family History   Problem Relation Age of Onset   • Diabetes Mother    • Cancer Father         LUNG   • Diabetes Sister    • Diabetes Brother    • No Known Problems Daughter    • No Known Problems Son    • No Known Problems Son    • No Known Problems Sister    • Diabetes Sister    • Asthma Sister      She  has a past medical history of Anesthesia, Arthritis, Bilateral cataracts, Fall at home, initial encounter (2019), H/O: hysterectomy (2009), High cholesterol, Plantar fasciitis (2009), and Previous back surgery (2009).   Past Surgical History:   Procedure Laterality Date   • BONE SPUR EXCISION Left 2019    Procedure: EXCISION, BONE SPUR- MEDIAL MALLEOLAR;  Surgeon: Rao Sarah M.D.;  Location: [facility];  Service: Orthopedics   • ORIF, ANKLE Left 2019    Procedure: ORIF, ANKLE- DELTOID RECONSTRUCTION AND PLACEMENT OF MEDIAL MALLEOLAR SCREW;  Surgeon: Rao Sarah M.D.;  Location: [facility];  Service: Orthopedics   • ANKLE TOTAL ARTHROPLASTY Left 2018    Procedure: ANKLE TOTAL ARTHROPLASTY;  Surgeon: Rao Sarah M.D.;  Location: [facility];  Service: Orthopedics   • ORIF, ANKLE Left 2017    left ankle x 3   • ORTHOPEDIC OSTEOTOMY Left 8/10/2016    Procedure: ORTHOPEDIC OSTEOTOMY - FOR ULNAR SHORTENING OSTEOTOMY;  Surgeon: Jerrell Linn M.D.;  Location: [facility];  Service:    • HYSTERECTOMY, VAGINAL  2009   • OTHER NEUROLOGICAL SURG   "2003    back surgery   • CERVICAL LAMINECTOMY POSTERIOR     • HYSTERECTOMY LAPAROSCOPY     • OTHER      bilat cataracts removal           Exam:     /68   Pulse 92   Temp 36.8 °C (98.2 °F)   Resp 16   Ht 1.676 m (5' 6\")   Wt 93.2 kg (205 lb 7.5 oz)   SpO2 97%  Body mass index is 33.16 kg/m².    Hearing good.    Dentition good  Alert, oriented in no acute distress.  Eye contact is good, speech goal directed, affect calm      Assessment and Plan. The following treatment and monitoring plan is recommended:      Uzma was seen today for medicare annual wellness and enuresis.    Diagnoses and all orders for this visit:    Medicare annual wellness visit, subsequent    SHERIF positive  -     ANTI-DNA(DS); Future  -     C3+C4+COMPT  -     THYROID PEROXIDASE  (TPO) AB; Future  -     ANTITHYROGLOBULIN AB; Future  -     ANTI-NEUTROPHIL CYTOPLASMIC AB  -     COMPLEMENT TOTAL (CH50); Future  -     CONNECTIVE TISSUE DISEASES PROFILE; Future  -     URINALYSIS; Future  -     OSPINA AB IGG; Future    Class 1 obesity due to excess calories without serious comorbidity with body mass index (BMI) of 32.0 to 32.9 in adult    History of recurrent UTIs    Pure hypercholesterolemia    Osteopenia of multiple sites    Other insomnia    Postnasal drip    Post-traumatic arthritis of left ankle    Prediabetes    Rib pain on left side    Stress incontinence in female    Swelling of finger joint of right hand  -     ANTI-DNA(DS); Future  -     C3+C4+COMPT  -     THYROID PEROXIDASE  (TPO) AB; Future  -     ANTITHYROGLOBULIN AB; Future  -     ANTI-NEUTROPHIL CYTOPLASMIC AB  -     COMPLEMENT TOTAL (CH50); Future  -     CONNECTIVE TISSUE DISEASES PROFILE; Future  -     URINALYSIS; Future  -     OSPINA AB IGG; Future    Trigeminal neuralgia pain    Vitamin D insufficiency    Breast pain  -     MA-DIAGNOSTIC MAMMO LEFT W/TOMOSYNTHESIS W/O CAD; Future  -     US-BREAST LIMITED-LEFT; Future    Mixed stress and urge urinary incontinence  -     " Discontinue: oxybutynin SR (DITROPAN-XL) 10 MG CR tablet; Take 1 Tablet by mouth every day.  -     oxybutynin SR (DITROPAN-XL) 10 MG CR tablet; Take 1 Tablet by mouth every day.      Problem   Breast Pain    She is having left-sided breast pain on side of left breast since few weeks.  Significant tenderness on palpation.  No mass palpated.  Ordered diagnostic mammogram and ultrasound.     Mixed Stress and Urge Urinary Incontinence    She has previous history of bladder sling surgery as she had 3 vaginal deliveries before.  She is having stress as well as urge incontinence symptoms.  She reports that she is not able to control her urine and and that interferes with her life.  Start oxybutynin 10 mg daily.  Discussed that if symptoms are not getting better we will refer her to urology.     Sherif Positive    She was having swelling of her right ring finger for which we did x-ray which showed There is normal bony mineralization.  There is no evidence of fracture, dislocation, or osseous lesion.  There is soft tissue swelling about the right third PIP joint. There is no evidence of periarticular demineralization or lucency. Further there is no evidence of periarticular calcification or joint space narrowing.  SHERIF titer came back positive at 1 ratio 640.  Rheumatoid arthritis, CCP antibody, inflammatory marker CRP, ESR, uric acid came back in normal range.  Ordered further testing for SHERIF titer being positive.     Other Insomnia    She reports history of sleep problems.  She denies any excessive snoring at night and she does not stop breathing while sleeping.  She tried melatonin and is not much helping with sleep.  She has not tried any prescription medication.  She tried trazodone but did not like that medication.  She is taking melatonin for sleep.  Continue same medication regimen.     History of Recurrent Utis    She gets recurrent UTIs due to stress incontinence.  She uses Pyridium as needed for symptoms.  Continue  same medication regimen.     Prediabetes    Family history of diabetes, last A1c came back in normal range.    Lab Results   Component Value Date/Time    HBA1C 5.4 12/28/2021 10:41 AM         Postnasal Drip    Doing well with allergy medications and Flonase.     Rib Pain On Left Side    She has been having rib pain on left side since few weeks.  She denies any trauma to her side.  She denies any redness, swelling and bruising over that area.  Order diagnostic mammogram and ultrasound.     Post-Traumatic Arthritis of Left Ankle    She has history of arthritic changes in left ankle.  She uses Voltaren gel as needed for symptoms.  Continue same medication regimen.     Class 1 Obesity Due to Excess Calories Without Serious Comorbidity With Body Mass Index (Bmi) of 32.0 to 32.9 in Adult    She is eating healthy diet and is physically active.  BMI at 33.16.  Recommended to continue to eat healthy diet and to physical activity     Vitamin D Insufficiency    Recent vitamin D level came back at 43.  She is currently taking vitamin D supplementation.  She has history of osteopenia.  Continue calcium and vitamin D supplementation.     Osteopenia    Previously treated with Fosamax in 2017 for fragility fracture of ankle.  Recent bone density scan in 03/ 2021 showed When compared with the most recent study dated 11/2/2015, there has been a 2.5% increase in the bone mineral density of the lumbar spine and a 0.5% increase in the bone mineral density of the left femur.   According to the World Health Organization classification, bone mineral density of this patient is osteopenia with increased fracture risk for the left femur and lumbar spine.  She is taking calcium and vitamin D supplementation.  Continue same supplementation.     Hyperlipidemia    She is currently taking atorvastatin 20 mg daily.  No side effects with medication, last lipid panel came back in normal range.    Lab Results   Component Value Date/Time    CHOLSTRLTOT  180 12/28/2021 1041    TRIGLYCERIDE 134 12/28/2021 1041    HDL 82 12/28/2021 1041    LDL 71 12/28/2021 1041    CHOLHDLRAT 3.3 02/04/2013 1030              Services suggested: No services needed at this time  Health Care Screening recommendations as per orders if indicated.  Referrals offered: PT/OT/Nutrition counseling/Behavioral Health/Smoking cessation as per orders if indicated.    Discussion today about general wellness and lifestyle habits:    · Prevent falls and reduce trip hazards; Cautioned about securing or removing rugs.  · Have a working fire alarm and carbon monoxide detector;   · Engage in regular physical activity and social activities       Follow-up: Return in about 2 months (around 3/12/2022).

## 2022-01-24 ENCOUNTER — HOSPITAL ENCOUNTER (OUTPATIENT)
Dept: LAB | Facility: MEDICAL CENTER | Age: 67
End: 2022-01-24
Attending: FAMILY MEDICINE
Payer: MEDICARE

## 2022-01-24 DIAGNOSIS — M25.441 SWELLING OF FINGER JOINT OF RIGHT HAND: ICD-10-CM

## 2022-01-24 DIAGNOSIS — R76.8 ANA POSITIVE: ICD-10-CM

## 2022-01-24 LAB
APPEARANCE UR: CLEAR
BACTERIA #/AREA URNS HPF: NEGATIVE /HPF
BILIRUB UR QL STRIP.AUTO: NEGATIVE
C3 SERPL-MCNC: 138.1 MG/DL (ref 87–200)
C4 SERPL-MCNC: 33.9 MG/DL (ref 19–52)
COLOR UR: YELLOW
EPI CELLS #/AREA URNS HPF: NEGATIVE /HPF
GLUCOSE UR STRIP.AUTO-MCNC: NEGATIVE MG/DL
KETONES UR STRIP.AUTO-MCNC: NEGATIVE MG/DL
LEUKOCYTE ESTERASE UR QL STRIP.AUTO: ABNORMAL
MICRO URNS: ABNORMAL
NITRITE UR QL STRIP.AUTO: NEGATIVE
PH UR STRIP.AUTO: 5.5 [PH] (ref 5–8)
PROT UR QL STRIP: NEGATIVE MG/DL
RBC # URNS HPF: NORMAL /HPF
RBC UR QL AUTO: NEGATIVE
SP GR UR STRIP.AUTO: 1.01
THYROPEROXIDASE AB SERPL-ACNC: <9 IU/ML (ref 0–9)
UROBILINOGEN UR STRIP.AUTO-MCNC: 0.2 MG/DL
WBC #/AREA URNS HPF: NORMAL /HPF

## 2022-01-24 PROCEDURE — 36415 COLL VENOUS BLD VENIPUNCTURE: CPT

## 2022-01-24 PROCEDURE — 86800 THYROGLOBULIN ANTIBODY: CPT

## 2022-01-24 PROCEDURE — 86036 ANCA SCREEN EACH ANTIBODY: CPT

## 2022-01-24 PROCEDURE — 86235 NUCLEAR ANTIGEN ANTIBODY: CPT | Mod: 91

## 2022-01-24 PROCEDURE — 81001 URINALYSIS AUTO W/SCOPE: CPT

## 2022-01-24 PROCEDURE — 86160 COMPLEMENT ANTIGEN: CPT

## 2022-01-24 PROCEDURE — 86376 MICROSOMAL ANTIBODY EACH: CPT

## 2022-01-24 PROCEDURE — 86162 COMPLEMENT TOTAL (CH50): CPT

## 2022-01-24 PROCEDURE — 83516 IMMUNOASSAY NONANTIBODY: CPT | Mod: 91

## 2022-01-24 PROCEDURE — 86225 DNA ANTIBODY NATIVE: CPT

## 2022-01-26 LAB
CH50 SERPL-ACNC: >94.5 U/ML (ref 38.7–89.9)
THYROGLOB AB SERPL-ACNC: <0.9 IU/ML (ref 0–4)

## 2022-01-27 LAB
CENTROMERE IGG TITR SER IF: 160 AU/ML (ref 0–40)
DSDNA AB TITR SER CLIF: 10 IU (ref 0–24)
ENA JO1 AB TITR SER: 0 AU/ML (ref 0–40)
ENA SCL70 IGG SER QL: 3 AU/ML (ref 0–40)
ENA SM IGG SER-ACNC: 3 AU/ML (ref 0–40)
ENA SS-B IGG SER IA-ACNC: 0 AU/ML (ref 0–40)
RIBOSOMAL P AB SER-ACNC: 3 AU/ML (ref 0–40)
SSA52 R0ENA AB IGG Q0420: 1 AU/ML (ref 0–40)
SSA60 R0ENA AB IGG Q0419: 0 AU/ML (ref 0–40)
U1 SNRNP IGG SER QL: 3 UNITS (ref 0–19)

## 2022-01-28 DIAGNOSIS — I77.82 ANCA-POSITIVE VASCULITIS (HCC): ICD-10-CM

## 2022-01-28 DIAGNOSIS — M25.441 SWELLING OF FINGER JOINT OF RIGHT HAND: ICD-10-CM

## 2022-01-28 DIAGNOSIS — R76.8 ANA POSITIVE: ICD-10-CM

## 2022-01-28 LAB
ANCA IFA PATTERN Q6048: ABNORMAL
ANCA IFA TITER Q6047: ABNORMAL
MYELOPEROXIDASE AB SER-ACNC: 4 AU/ML (ref 0–19)
PROTEINASE3 AB SER-ACNC: 1 AU/ML (ref 0–19)

## 2022-01-31 ENCOUNTER — HOSPITAL ENCOUNTER (OUTPATIENT)
Dept: RADIOLOGY | Facility: MEDICAL CENTER | Age: 67
End: 2022-01-31
Attending: FAMILY MEDICINE
Payer: MEDICARE

## 2022-01-31 DIAGNOSIS — N64.4 BREAST PAIN: ICD-10-CM

## 2022-01-31 PROCEDURE — G0279 TOMOSYNTHESIS, MAMMO: HCPCS

## 2022-01-31 PROCEDURE — 76642 ULTRASOUND BREAST LIMITED: CPT | Mod: LT

## 2022-02-17 ENCOUNTER — OFFICE VISIT (OUTPATIENT)
Dept: RHEUMATOLOGY | Facility: MEDICAL CENTER | Age: 67
End: 2022-02-17
Payer: MEDICARE

## 2022-02-17 VITALS
BODY MASS INDEX: 32.72 KG/M2 | HEART RATE: 75 BPM | SYSTOLIC BLOOD PRESSURE: 124 MMHG | HEIGHT: 66 IN | DIASTOLIC BLOOD PRESSURE: 70 MMHG | OXYGEN SATURATION: 93 % | TEMPERATURE: 98.4 F | WEIGHT: 203.6 LBS | RESPIRATION RATE: 16 BRPM

## 2022-02-17 DIAGNOSIS — M71.349 SYNOVIAL CYST OF HAND: ICD-10-CM

## 2022-02-17 DIAGNOSIS — R76.8 POSITIVE ANA (ANTINUCLEAR ANTIBODY): ICD-10-CM

## 2022-02-17 PROCEDURE — 99204 OFFICE O/P NEW MOD 45 MIN: CPT | Performed by: STUDENT IN AN ORGANIZED HEALTH CARE EDUCATION/TRAINING PROGRAM

## 2022-02-17 RX ORDER — TRAZODONE HYDROCHLORIDE 50 MG/1
50 TABLET ORAL
COMMUNITY
Start: 2022-02-09 | End: 2022-03-11

## 2022-02-17 RX ORDER — AMPICILLIN TRIHYDRATE 250 MG
CAPSULE ORAL
COMMUNITY
Start: 2022-02-16

## 2022-02-17 ASSESSMENT — FIBROSIS 4 INDEX: FIB4 SCORE: 0.81

## 2022-02-17 ASSESSMENT — PAIN SCALES - GENERAL: PAINLEVEL: NO PAIN

## 2022-02-17 NOTE — PATIENT INSTRUCTIONS
AFTER VISIT INSTRUCTIONS    Below are important information to help you navigate your healthcare needs and help us serve you safely and effectively:  • If lab tests and imaging studies were ordered, be sure to go get them done.  • If new prescriptions or refills were ordered, go to the pharmacy to pick them up.  • Take your medications exactly as prescribed unless instructed otherwise.  • Follow up with your primary care provider and/or specialists accordingly.  • If there are significant findings from your lab tests and imaging studies, I will notify you with explanations via MyChart, letter, or phone call, otherwise you can view them on [employer] and let me know if you have any questions.  • Sign up for [employer] if you have not already done so, in order to have access to the results of your lab tests and imaging studies, and to be able to send and receive messages from me.  • Please note that [employer] messaging is for non-urgent matters that do not require immediate attention and are typically read only during office hours, so do not expect immediate responses from me.

## 2022-02-17 NOTE — PROGRESS NOTES
[facility]  [address], [address], [place] [address]  Phone: [phone] / Fax: [phone]    RHEUMATOLOGY NEW PATIENT NOTE      Date of service: 02/17/2022      CHIEF COMPLAINT:   Chief Complaint   Patient presents with   • New Patient     Positive SHERIF, Elevated ANCA, swelling of right finger       HISTORY OF PRESENT ILLNESS:  Uzma Gamez is a 66 y.o. female who presents for evaluation of isolated right middle finger joint swelling and pain with concern for autoimmune inflammatory arthritis in the setting of positive SHERIF. She reports symptom onset around 6/2021 associated with morning stiffness lasting about 45 minutes and improving with range of motion movement. She reports no preceding trauma, fevers, chills, night sweats, lymphadenopathies, eye pain, oral ulcers, nasal ulcers, patchy alopecia, photosensitive rash, pleurisy, dyspnea, entheseal pain, significant pain or stiffness in other joints, Raynaud phenomenon, skin thickening, muscle weakness, gastric reflux, abdominal pain, hematochezia, frothy urine, dysuria, and hematuria.  Notable labs as of 1/24/22: positIve SHERIF 1:640 centromere pattern, anti-centromere 160, ANCA 1:40 atypical p-ANCA pattern; negative anti-Scl-70, anti-Jo1, RF, anti-CCP, CRP and ESR.    REVIEW OF SYSTEMS:  Except as noted in the history above, a complete review of systems with emphasis on autoimmune inflammatory symptoms was otherwise negative.      CURRENT PROBLEM LIST:  Patient Active Problem List   Diagnosis   • Hyperlipidemia   • Osteopenia   • Vitamin D insufficiency   • Class 1 obesity due to excess calories without serious comorbidity with body mass index (BMI) of 32.0 to 32.9 in adult   • Trigeminal neuralgia pain   • Post-traumatic arthritis of left ankle   • Stress incontinence in female   • Other insomnia   • History of recurrent UTIs   • Prediabetes   • Swelling of finger joint of right hand   • Postnasal drip   • Rib pain on left  side   • Breast pain   • Mixed stress and urge urinary incontinence   • Positive SHERIF (antinuclear antibody)   • Synovial cyst of hand (right 3rd finger PIP joint)       PAST MEDICAL HISTORY:  Past Medical History:   Diagnosis Date   • Anesthesia     hard to wake up   • Arthritis     left ankle   • Bilateral cataracts     johann IOL    • Fall at home, initial encounter 1/11/2019   • H/O: hysterectomy 6/5/2009   • High cholesterol    • Plantar fasciitis 6/5/2009   • Previous back surgery 6/5/2009       PAST SURGICAL HISTORY:  Past Surgical History:   Procedure Laterality Date   • BONE SPUR EXCISION Left 9/9/2019    Procedure: EXCISION, BONE SPUR- MEDIAL MALLEOLAR;  Surgeon: Rao Sarah M.D.;  Location: SURGERY [facility];  Service: Orthopedics   • ORIF, ANKLE Left 9/9/2019    Procedure: ORIF, ANKLE- DELTOID RECONSTRUCTION AND PLACEMENT OF MEDIAL MALLEOLAR SCREW;  Surgeon: Rao Sarah M.D.;  Location: SURGERY [facility];  Service: Orthopedics   • ANKLE TOTAL ARTHROPLASTY Left 8/27/2018    Procedure: ANKLE TOTAL ARTHROPLASTY;  Surgeon: Rao Sarah M.D.;  Location: SURGERY [facility];  Service: Orthopedics   • ORIF, ANKLE Left 11/28/2017    left ankle x 3   • ORTHOPEDIC OSTEOTOMY Left 8/10/2016    Procedure: ORTHOPEDIC OSTEOTOMY - FOR ULNAR SHORTENING OSTEOTOMY;  Surgeon: Jerrell Linn M.D.;  Location: [facility];  Service:    • HYSTERECTOMY, VAGINAL  2009   • OTHER NEUROLOGICAL SURG  2003    back surgery   • CERVICAL LAMINECTOMY POSTERIOR     • HYSTERECTOMY LAPAROSCOPY     • OTHER      bilat cataracts removal       MEDICATIONS:  Current Outpatient Medications:   •  oxybutynin SR (DITROPAN-XL) 10 MG CR tablet, Take 1 Tablet by mouth every day., Disp: 30 Tablet, Rfl: 1  •  fluticasone (FLONASE) 50 MCG/ACT nasal spray, ADMINISTER 1 SPRAY INTO AFFECTED NOSTRIL(S) 2 TIMES A DAY., Disp: 16 mL, Rfl: 1  •  atorvastatin (LIPITOR) 20 MG Tab, Take 1 tablet by mouth every bedtime.,  Disp: 90 tablet, Rfl: 3  •  gabapentin (NEURONTIN) 600 MG tablet, TAKE 1 TABLET BY MOUTH THREE TIMES A DAY, Disp: 270 Tab, Rfl: 3  •  Psyllium (METAMUCIL FIBER PO), Take  by mouth., Disp: , Rfl:   •  Diclofenac Sodium (VOLTAREN) 1 % Gel, Apply 2 g to affected area(s) 2 times a day as needed (back pain)., Disp: 3 Tube, Rfl: 3  •  Polyethyl Glycol-Propyl Glycol (SYSTANE OP), 1 Drop by Ophthalmic route every morning., Disp: , Rfl:   •  Melatonin 5 MG Cap, Take 5 mg by mouth at bedtime as needed., Disp: , Rfl:   •  SUPER B COMPLEX/C PO, Take 1 Tab by mouth every morning., Disp: , Rfl:   •  phenazopyridine (PYRIDIUM) 200 MG Tab, Take 1 Tablet by mouth 3 times a day as needed., Disp: 6 Tablet, Rfl: 0    ALLERGIES:   Allergies   Allergen Reactions   • Nkda [No Known Drug Allergy]        IMMUNIZATIONS:  Immunization History   Administered Date(s) Administered   • Hepatitis A Vaccine, Adult 10/12/2015, 09/26/2016   • Hepatitis B Vaccine (Adol/Adult) 10/12/2015, 12/11/2015, 09/26/2016   • INFLUENZA TIV (IM) 10/28/2010, 10/24/2011, 11/26/2012, 10/16/2013   • Influenza Seasonal Injectable - Historical Data 10/24/2011, 11/26/2012, 10/16/2013   • Influenza Vaccine Adult HD 09/19/2019, 11/02/2021   • Influenza Vaccine H1N1 - HISTORICAL DATA 01/11/2010   • Influenza Vaccine Quad Inj (Pf) 09/20/2017, 09/13/2018, 09/08/2020   • Influenza Vaccine Quad Inj (Preserved) 10/28/2014, 10/12/2015, 10/12/2016   • Pfizer SARS-CoV-2 Vaccine 12+ 03/11/2021, 04/02/2021, 11/02/2021   • Pneumococcal Conjugate Vaccine (Prevnar/PCV-13) 03/22/2019   • Pneumococcal polysaccharide vaccine (PPSV-23) 08/18/2020   • Tdap Vaccine 10/12/2015   • Zoster Vaccine Live (ZVL) (Zostavax) - HISTORICAL DATA 09/24/2014   • Zoster Vaccine Recombinant (RZV) (SHINGRIX) 07/03/2020, 09/08/2020       SOCIAL HISTORY:   Social History     Tobacco Use   • Smoking status: Former Smoker     Packs/day: 0.50     Years: 6.00     Pack years: 3.00     Types: Cigarettes     Quit  "date: 1978     Years since quittin.4   • Smokeless tobacco: Never Used   Vaping Use   • Vaping Use: Never used   Substance Use Topics   • Alcohol use: Yes     Alcohol/week: 0.0 oz     Comment: 2 per week    • Drug use: No       FAMILY HISTORY:  Family History   Problem Relation Age of Onset   • Diabetes Mother    • Cancer Father         LUNG   • Diabetes Sister    • Diabetes Brother    • No Known Problems Daughter    • No Known Problems Son    • No Known Problems Son    • No Known Problems Sister    • Diabetes Sister    • Asthma Sister        PHYSICAL EXAMINATION:  /70 (BP Location: Left arm, Patient Position: Sitting, BP Cuff Size: Adult)   Pulse 75   Temp 36.9 °C (98.4 °F) (Temporal)   Resp 16   Ht 1.676 m (5' 6\")   Wt 92.4 kg (203 lb 9.6 oz)   SpO2 93% Body mass index is 32.86 kg/m².    General: Appears well and comfortable; no acute distress  Eyes: Extraocular muscles and vision intact; no conjunctival lesions  ENT: Oral mucosa pink and moist; no oral or nasal lesions  Head/Neck: Neck supple with full ROM; no scalp lesions  Cardiovascular: Regular rate and rhythm; no murmurs  Respiratory: Clear to auscultation bilaterally, no rales  Gastrointestinal: Abdomen soft and non-tender; no organomegaly  Integumentary: Skin soft and supple; no significant cutaneous lesions  Musculoskeletal: Right 3rd finger PIP synovial cyst with mild tenderness; no effusion, warmth, erythema, or signs of synovitis at any joints; no significant restrictions in ROM  Neurologic: No focal sensory or motor deficits  Psychiatric: Mood and affect appropriate    LAB & RAD RESULTS FROM PAST VISITS:  WBC   Date Value Ref Range Status   2021 6.9 4.8 - 10.8 K/uL Final     Hemoglobin   Date Value Ref Range Status   2021 15.6 12.0 - 16.0 g/dL Final     Hematocrit   Date Value Ref Range Status   2021 47.0 37.0 - 47.0 % Final     MCV   Date Value Ref Range Status   2021 96.1 81.4 - 97.8 fL Final     Sodium "   Date Value Ref Range Status   12/28/2021 139 135 - 145 mmol/L Final     Potassium   Date Value Ref Range Status   12/28/2021 4.2 3.6 - 5.5 mmol/L Final     Co2   Date Value Ref Range Status   12/28/2021 25 20 - 33 mmol/L Final     Glucose   Date Value Ref Range Status   12/28/2021 88 65 - 99 mg/dL Final     Bun   Date Value Ref Range Status   12/28/2021 21 8 - 22 mg/dL Final     Creatinine   Date Value Ref Range Status   12/28/2021 0.62 0.50 - 1.40 mg/dL Final     Magnesium   Date Value Ref Range Status   12/05/2016 2.0 1.5 - 2.5 mg/dL Final     Albumin   Date Value Ref Range Status   12/28/2021 4.3 3.2 - 4.9 g/dL Final     Folate -Folic Acid   Date Value Ref Range Status   02/11/2017 >23.6 >4.0 ng/mL Final     Uric Acid   Date Value Ref Range Status   12/28/2021 6.1 1.9 - 8.2 mg/dL Final       LAB & RAD RESULTS FROM PRESENT VISIT:  No orders of the defined types were placed in this encounter.  No image results found.    All relevant laboratory testing and imaging results reported on this note were personally reviewed by me.      IMPRESSION & RECOMMENDATION:  Uzma Gamez is a 66 y.o. female with history as noted whose presentation merits the following diagnostic and clinical status impressions and recommendations:    1. Synovial cyst of hand (right 3rd finger PIP joint)  This is essentially like Baker's cyst most likely secondary to osteoarthritis of which PIP joint is one of the commonly affects joints. This does not appear to be related to an underlying inflammatory arthritis which would be an unusual manifestation in this case.  - Warm compresses and Voltaren 1% gel 3 times daily  - Consider steroid injection and/or hand surgery evaluation if no improvement in 1-2 months    2. Positive SHERIF (antinuclear antibody)  There is no significant clinical evidence to suggest an underlying SHERIF-associated autoimmune disease (limited scleroderma in this case given positive anti-centromere). Positive SHERIF can be found in  over 10% of the general population with no clinical evidence of autoimmune disease, so must be interpreted in the context of the overall clinical picture with close attention to disease-specific manifestations. However, the presence of persistently positive SHERIF does confer some risk of an SHERIF-associated autoimmune disease, such as Sjogren syndrome, lupus, myositis, or scleroderma, so clinical follow-up is reasonable, especially in the setting of persistent unexplainable symptoms.  - No need for additional investigation at this time    Return to Clinic for Follow-up: As needed      Thank you for giving me the opportunity to participate in the care of Uzma Gamez.    Boubacar Wen MD, MS  Attending Rheumatologist

## 2022-03-03 DIAGNOSIS — N39.46 MIXED STRESS AND URGE URINARY INCONTINENCE: ICD-10-CM

## 2022-03-03 RX ORDER — OXYBUTYNIN CHLORIDE 10 MG/1
10 TABLET, EXTENDED RELEASE ORAL DAILY
Qty: 90 TABLET | Refills: 1 | Status: SHIPPED | OUTPATIENT
Start: 2022-03-03 | End: 2022-03-11 | Stop reason: SDUPTHER

## 2022-03-11 ENCOUNTER — OFFICE VISIT (OUTPATIENT)
Dept: MEDICAL GROUP | Facility: MEDICAL CENTER | Age: 67
End: 2022-03-11
Payer: MEDICARE

## 2022-03-11 VITALS
RESPIRATION RATE: 16 BRPM | SYSTOLIC BLOOD PRESSURE: 122 MMHG | WEIGHT: 202.82 LBS | OXYGEN SATURATION: 95 % | HEART RATE: 88 BPM | TEMPERATURE: 97.7 F | HEIGHT: 66 IN | BODY MASS INDEX: 32.6 KG/M2 | DIASTOLIC BLOOD PRESSURE: 62 MMHG

## 2022-03-11 DIAGNOSIS — N39.46 MIXED STRESS AND URGE URINARY INCONTINENCE: ICD-10-CM

## 2022-03-11 DIAGNOSIS — R26.9 ABNORMAL GAIT: ICD-10-CM

## 2022-03-11 DIAGNOSIS — E78.00 PURE HYPERCHOLESTEROLEMIA: Chronic | ICD-10-CM

## 2022-03-11 PROCEDURE — 99214 OFFICE O/P EST MOD 30 MIN: CPT | Performed by: FAMILY MEDICINE

## 2022-03-11 RX ORDER — CHOLECALCIFEROL (VITAMIN D3) 50 MCG
TABLET ORAL
COMMUNITY
Start: 2022-02-16

## 2022-03-11 RX ORDER — OXYBUTYNIN CHLORIDE 10 MG/1
10 TABLET, EXTENDED RELEASE ORAL DAILY
Qty: 90 TABLET | Refills: 3 | Status: SHIPPED | OUTPATIENT
Start: 2022-03-11 | End: 2023-03-06

## 2022-03-11 RX ORDER — ATORVASTATIN CALCIUM 20 MG/1
20 TABLET, FILM COATED ORAL
Qty: 90 TABLET | Refills: 3 | Status: SHIPPED | OUTPATIENT
Start: 2022-03-11 | End: 2022-03-25

## 2022-03-11 ASSESSMENT — FIBROSIS 4 INDEX: FIB4 SCORE: 0.81

## 2022-03-11 ASSESSMENT — ENCOUNTER SYMPTOMS
CHILLS: 0
PALPITATIONS: 0
FEVER: 0

## 2022-03-11 NOTE — ASSESSMENT & PLAN NOTE
Chronic health problem, counseled to get up slowly from sitting position.  We discussed about physical therapy in future.  Recommended to use assistive devices like cane if she is having difficulty.  Counseled regarding fall precautions.  Continue calcium and vitamin D supplementation.

## 2022-03-11 NOTE — PROGRESS NOTES
FAMILY MEDICINE VISIT                                                               Chief complaint::Diagnoses of Mixed stress and urge urinary incontinence, Pure hypercholesterolemia, and Abnormal gait were pertinent to this visit.    History of present illness: Uzma Gamez is a 66 y.o. female who presented for follow-up for medications, medication refill.      Problem   Abnormal Gait    She reports that she had left ankle problems before and she does not have full range of motion at left ankle.  She denies any recent falls.  She has history of osteopenia, currently taking calcium and vitamin D.     Mixed Stress and Urge Urinary Incontinence    She has previous history of bladder sling surgery as she had 3 vaginal deliveries before.  She is having stress as well as urge incontinence symptoms.  She reports that she is not able to control her urine and and that interferes with her life.  I started her on oxybutynin 10 mg at last visit.  She reports significant improvement in symptoms with this medication.  No major side effects, she has dry mouth.     Hyperlipidemia    She is currently taking atorvastatin 20 mg daily.  No side effects with medication, last lipid panel came back in normal range.    Lab Results   Component Value Date/Time    CHOLSTRLTOT 180 12/28/2021 1041    TRIGLYCERIDE 134 12/28/2021 1041    HDL 82 12/28/2021 1041    LDL 71 12/28/2021 1041    CHOLHDLRAT 3.3 02/04/2013 1030            Review of systems:     Review of Systems   Constitutional: Negative for chills, fever and malaise/fatigue.   Cardiovascular: Negative for chest pain, palpitations and leg swelling.   Genitourinary: Positive for frequency and urgency.        Medications and Allergies:     Current Outpatient Medications   Medication Sig Dispense Refill   • Cholecalciferol (VITAMIN D) 2000 UNIT Tab      • oxybutynin SR (DITROPAN-XL) 10 MG CR tablet Take 1 Tablet by mouth every day. 90 Tablet 3   • atorvastatin (LIPITOR) 20 MG Tab Take  "1 Tablet by mouth at bedtime. 90 Tablet 3   • Coenzyme Q10 (COQ10) 200 MG Cap      • Calcium Carbonate-Vitamin D3 600-400 MG-UNIT Tab      • fluticasone (FLONASE) 50 MCG/ACT nasal spray ADMINISTER 1 SPRAY INTO AFFECTED NOSTRIL(S) 2 TIMES A DAY. 16 mL 1   • gabapentin (NEURONTIN) 600 MG tablet TAKE 1 TABLET BY MOUTH THREE TIMES A  Tab 3   • Psyllium (METAMUCIL FIBER PO) Take  by mouth.     • Diclofenac Sodium (VOLTAREN) 1 % Gel Apply 2 g to affected area(s) 2 times a day as needed (back pain). 3 Tube 3   • Polyethyl Glycol-Propyl Glycol (SYSTANE OP) 1 Drop by Ophthalmic route every morning.     • Melatonin 5 MG Cap Take 5 mg by mouth at bedtime as needed.     • SUPER B COMPLEX/C PO Take 1 Tab by mouth every morning.       No current facility-administered medications for this visit.          Vitals:    /62 (BP Location: Left arm, Patient Position: Sitting, BP Cuff Size: Adult)   Pulse 88   Temp 36.5 °C (97.7 °F)   Resp 16   Ht 1.676 m (5' 6\")   Wt 92 kg (202 lb 13.2 oz)   SpO2 95%  Body mass index is 32.74 kg/m².    Physical Exam:     Physical Exam  Constitutional:       General: She is not in acute distress.  HENT:      Head: Normocephalic and atraumatic.   Eyes:      Conjunctiva/sclera: Conjunctivae normal.   Cardiovascular:      Rate and Rhythm: Normal rate.   Pulmonary:      Effort: Pulmonary effort is normal. No respiratory distress.   Musculoskeletal:         General: No deformity.      Cervical back: Neck supple.   Skin:     Findings: No rash.   Neurological:      Mental Status: She is alert.      Gait: Gait is intact.   Psychiatric:         Mood and Affect: Mood and affect normal.         Judgment: Judgment normal.            Assessment/Plan:    Problem List Items Addressed This Visit     Abnormal gait     Chronic health problem, counseled to get up slowly from sitting position.  We discussed about physical therapy in future.  Recommended to use assistive devices like cane if she is having " difficulty.  Counseled regarding fall precautions.  Continue calcium and vitamin D supplementation.         Hyperlipidemia (Chronic)     Chronic health problem, stable, continue atorvastatin 20 mg daily.  Recheck labs in 1 year.         Relevant Medications    atorvastatin (LIPITOR) 20 MG Tab    Mixed stress and urge urinary incontinence     Chronic health problem, symptoms are improving with oxybutynin medication.  Continue same medication regimen.  Discussed with patient about side effects of this medication.  Recommended to drink plenty of water for dry mouth.         Relevant Medications    oxybutynin SR (DITROPAN-XL) 10 MG CR tablet           Please note that this dictation was created using voice recognition software. I have made every reasonable attempt to correct obvious errors, but I expect that there are errors of grammar and possibly content that I did not discover before finalizing the note.    Follow up in 6 months for medication follow-up.

## 2022-03-11 NOTE — ASSESSMENT & PLAN NOTE
Chronic health problem, symptoms are improving with oxybutynin medication.  Continue same medication regimen.  Discussed with patient about side effects of this medication.  Recommended to drink plenty of water for dry mouth.

## 2022-03-23 ENCOUNTER — RESEARCH ENCOUNTER (OUTPATIENT)
Dept: MEDICAL GROUP | Facility: PHYSICIAN GROUP | Age: 67
End: 2022-03-23
Payer: MEDICARE

## 2022-03-23 DIAGNOSIS — Z00.6 RESEARCH STUDY PATIENT: ICD-10-CM

## 2022-03-23 NOTE — RESEARCH NOTE
[employer] [place] [employer] enrollment complete. Saliva sample collected onsite. Patient enrolled in [facility].

## 2022-03-25 DIAGNOSIS — E78.00 PURE HYPERCHOLESTEROLEMIA: Chronic | ICD-10-CM

## 2022-03-25 RX ORDER — ATORVASTATIN CALCIUM 20 MG/1
20 TABLET, FILM COATED ORAL
Qty: 90 TABLET | Refills: 2 | Status: SHIPPED | OUTPATIENT
Start: 2022-03-25

## 2022-04-12 DIAGNOSIS — Z00.6 RESEARCH STUDY PATIENT: ICD-10-CM

## 2022-04-13 ENCOUNTER — HOSPITAL ENCOUNTER (OUTPATIENT)
Facility: MEDICAL CENTER | Age: 67
End: 2022-04-13
Attending: PATHOLOGY
Payer: COMMERCIAL

## 2022-04-13 DIAGNOSIS — Z00.6 RESEARCH STUDY PATIENT: ICD-10-CM

## 2022-04-19 LAB
ELF SCORE: 9.2
RELATIVE RISK: NORMAL
RISK GROUP: NORMAL
RISK: 3.3 %

## 2022-05-17 LAB
APOB+LDLR+PCSK9 GENE MUT ANL BLD/T: NOT DETECTED
BRCA1+BRCA2 DEL+DUP + FULL MUT ANL BLD/T: NOT DETECTED
MLH1+MSH2+MSH6+PMS2 GN DEL+DUP+FUL M: NOT DETECTED

## 2022-06-22 ENCOUNTER — OFFICE VISIT (OUTPATIENT)
Dept: URGENT CARE | Facility: PHYSICIAN GROUP | Age: 67
End: 2022-06-22
Payer: MEDICARE

## 2022-06-22 ENCOUNTER — HOSPITAL ENCOUNTER (OUTPATIENT)
Facility: MEDICAL CENTER | Age: 67
End: 2022-06-22
Attending: NURSE PRACTITIONER
Payer: MEDICARE

## 2022-06-22 VITALS
TEMPERATURE: 96.9 F | RESPIRATION RATE: 16 BRPM | OXYGEN SATURATION: 95 % | HEIGHT: 66 IN | BODY MASS INDEX: 32.14 KG/M2 | SYSTOLIC BLOOD PRESSURE: 130 MMHG | HEART RATE: 94 BPM | DIASTOLIC BLOOD PRESSURE: 84 MMHG | WEIGHT: 200 LBS

## 2022-06-22 DIAGNOSIS — R39.9 UTI SYMPTOMS: ICD-10-CM

## 2022-06-22 DIAGNOSIS — N39.0 URINARY TRACT INFECTION WITHOUT HEMATURIA, SITE UNSPECIFIED: ICD-10-CM

## 2022-06-22 LAB
APPEARANCE UR: CLEAR
BILIRUB UR STRIP-MCNC: NEGATIVE MG/DL
COLOR UR AUTO: YELLOW
GLUCOSE UR STRIP.AUTO-MCNC: NEGATIVE MG/DL
KETONES UR STRIP.AUTO-MCNC: NEGATIVE MG/DL
LEUKOCYTE ESTERASE UR QL STRIP.AUTO: NORMAL
NITRITE UR QL STRIP.AUTO: NEGATIVE
PH UR STRIP.AUTO: 7 [PH] (ref 5–8)
PROT UR QL STRIP: NEGATIVE MG/DL
RBC UR QL AUTO: NEGATIVE
SP GR UR STRIP.AUTO: 1.01
UROBILINOGEN UR STRIP-MCNC: 0.2 MG/DL

## 2022-06-22 PROCEDURE — 99214 OFFICE O/P EST MOD 30 MIN: CPT | Performed by: NURSE PRACTITIONER

## 2022-06-22 PROCEDURE — 81002 URINALYSIS NONAUTO W/O SCOPE: CPT | Performed by: NURSE PRACTITIONER

## 2022-06-22 PROCEDURE — 87086 URINE CULTURE/COLONY COUNT: CPT

## 2022-06-22 RX ORDER — CEFDINIR 300 MG/1
300 CAPSULE ORAL 2 TIMES DAILY
Qty: 10 CAPSULE | Refills: 0 | Status: SHIPPED | OUTPATIENT
Start: 2022-06-22 | End: 2022-06-27

## 2022-06-22 RX ORDER — PHENAZOPYRIDINE HYDROCHLORIDE 200 MG/1
200 TABLET, FILM COATED ORAL 3 TIMES DAILY PRN
Qty: 6 TABLET | Refills: 0 | Status: SHIPPED | OUTPATIENT
Start: 2022-06-22 | End: 2022-06-24

## 2022-06-22 ASSESSMENT — ENCOUNTER SYMPTOMS: CONSTITUTIONAL NEGATIVE: 1

## 2022-06-22 ASSESSMENT — VISUAL ACUITY: OU: 1

## 2022-06-22 ASSESSMENT — FIBROSIS 4 INDEX: FIB4 SCORE: 0.83

## 2022-06-22 NOTE — PROGRESS NOTES
Subjective:     Uzma Gamez is a 67 y.o. female who presents for UTI (Frequency Burning pressure started yesterday)       UTI  This is a new problem. The current episode started yesterday. The problem has been gradually worsening. Associated symptoms include urinary symptoms (Dysuria).     Patient reports history of UTI with similar symptoms.    Unique test result dated 10/5/2021 reviewed: Urine culture with usual skin kyaw    Unique test result dated 6/10/2015 reviewed: Urine culture positive for Streptococcus group B    Patient was screened prior to rooming and denied COVID-19 diagnosis or contact with a person who has been diagnosed or is suspected to have COVID-19. During this visit, appropriate PPE was worn, hand hygiene was performed, and the patient and any visitors were masked.     PMH:  has a past medical history of Anesthesia, Arthritis, Bilateral cataracts, Fall at home, initial encounter (1/11/2019), H/O: hysterectomy (6/5/2009), High cholesterol, Plantar fasciitis (6/5/2009), and Previous back surgery (6/5/2009).    MEDS:   Current Outpatient Medications:   •  cefdinir (OMNICEF) 300 MG Cap, Take 1 Capsule by mouth 2 times a day for 5 days., Disp: 10 Capsule, Rfl: 0  •  phenazopyridine (PYRIDIUM) 200 MG Tab, Take 1 Tablet by mouth 3 times a day as needed (Dysuria) for up to 2 days., Disp: 6 Tablet, Rfl: 0  •  Cholecalciferol (VITAMIN D) 2000 UNIT Tab, , Disp: , Rfl:   •  Coenzyme Q10 (COQ10) 200 MG Cap, , Disp: , Rfl:   •  Calcium Carbonate-Vitamin D3 600-400 MG-UNIT Tab, , Disp: , Rfl:   •  fluticasone (FLONASE) 50 MCG/ACT nasal spray, ADMINISTER 1 SPRAY INTO AFFECTED NOSTRIL(S) 2 TIMES A DAY., Disp: 16 mL, Rfl: 1  •  Psyllium (METAMUCIL FIBER PO), Take  by mouth., Disp: , Rfl:   •  Diclofenac Sodium (VOLTAREN) 1 % Gel, Apply 2 g to affected area(s) 2 times a day as needed (back pain)., Disp: 3 Tube, Rfl: 3  •  Polyethyl Glycol-Propyl Glycol (SYSTANE OP), 1 Drop by Ophthalmic route every morning.,  Disp: , Rfl:   •  SUPER B COMPLEX/C PO, Take 1 Tab by mouth every morning., Disp: , Rfl:   •  atorvastatin (LIPITOR) 20 MG Tab, TAKE 1 TABLET BY MOUTH AT BEDTIME (Patient not taking: Reported on 6/22/2022), Disp: 90 Tablet, Rfl: 2  •  oxybutynin SR (DITROPAN-XL) 10 MG CR tablet, Take 1 Tablet by mouth every day. (Patient not taking: Reported on 6/22/2022), Disp: 90 Tablet, Rfl: 3  •  gabapentin (NEURONTIN) 600 MG tablet, TAKE 1 TABLET BY MOUTH THREE TIMES A DAY (Patient not taking: Reported on 6/22/2022), Disp: 270 Tab, Rfl: 3  •  Melatonin 5 MG Cap, Take 5 mg by mouth at bedtime as needed., Disp: , Rfl:     ALLERGIES:   Allergies   Allergen Reactions   • Nkda [No Known Drug Allergy]      SURGHX:   Past Surgical History:   Procedure Laterality Date   • BONE SPUR EXCISION Left 9/9/2019    Procedure: EXCISION, BONE SPUR- MEDIAL MALLEOLAR;  Surgeon: Rao Sarah M.D.;  Location: [facility];  Service: Orthopedics   • ORIF, ANKLE Left 9/9/2019    Procedure: ORIF, ANKLE- DELTOID RECONSTRUCTION AND PLACEMENT OF MEDIAL MALLEOLAR SCREW;  Surgeon: Rao Sarah M.D.;  Location: [facility];  Service: Orthopedics   • ANKLE TOTAL ARTHROPLASTY Left 8/27/2018    Procedure: ANKLE TOTAL ARTHROPLASTY;  Surgeon: Rao Sarah M.D.;  Location: [facility];  Service: Orthopedics   • ORIF, ANKLE Left 11/28/2017    left ankle x 3   • ORTHOPEDIC OSTEOTOMY Left 8/10/2016    Procedure: ORTHOPEDIC OSTEOTOMY - FOR ULNAR SHORTENING OSTEOTOMY;  Surgeon: Jerrell Linn M.D.;  Location: [facility];  Service:    • HYSTERECTOMY, VAGINAL  2009   • OTHER NEUROLOGICAL SURG  2003    back surgery   • CERVICAL LAMINECTOMY POSTERIOR     • HYSTERECTOMY LAPAROSCOPY     • OTHER      bilat cataracts removal     SOCHX:  reports that she quit smoking about 43 years ago. Her smoking use included cigarettes. She has a 3.00 pack-year smoking history. She has never used smokeless tobacco. She  "reports current alcohol use. She reports that she does not use drugs.     FH: Reviewed with patient, not pertinent to this visit.    Review of Systems   Constitutional: Negative.    Genitourinary: Positive for dysuria and urgency.   All other systems reviewed and are negative.    Additional details per HPI.      Objective:     /84 (BP Location: Right arm, Patient Position: Sitting, BP Cuff Size: Adult)   Pulse 94   Temp 36.1 °C (96.9 °F) (Temporal)   Resp 16   Ht 1.676 m (5' 6\")   Wt 90.7 kg (200 lb)   SpO2 95%   BMI 32.28 kg/m²     Physical Exam  Nursing note reviewed.   Constitutional:       General: She is not in acute distress.     Appearance: She is well-developed. She is not ill-appearing or toxic-appearing.   Eyes:      General: Vision grossly intact.   Cardiovascular:      Rate and Rhythm: Normal rate.   Pulmonary:      Effort: Pulmonary effort is normal. No respiratory distress.   Musculoskeletal:         General: No deformity. Normal range of motion.   Skin:     Coloration: Skin is not pale.   Neurological:      Mental Status: She is alert and oriented to person, place, and time.      Motor: No weakness.   Psychiatric:         Behavior: Behavior normal. Behavior is cooperative.     UA: moderate LE      Assessment/Plan:     1. UTI symptoms  - POCT Urinalysis  - URINE CULTURE(NEW); Future  - phenazopyridine (PYRIDIUM) 200 MG Tab; Take 1 Tablet by mouth 3 times a day as needed (Dysuria) for up to 2 days.  Dispense: 6 Tablet; Refill: 0    2. Urinary tract infection without hematuria, site unspecified  - cefdinir (OMNICEF) 300 MG Cap; Take 1 Capsule by mouth 2 times a day for 5 days.  Dispense: 10 Capsule; Refill: 0    Rx as above sent electronically. Increase fluids.    Differential diagnosis, natural history, supportive care, over-the-counter symptom management per 's instructions, close monitoring, and indications for immediate follow-up discussed.     All questions answered. Patient " agrees with the plan of care.    Discharge summary provided via [employer].

## 2022-06-23 DIAGNOSIS — R39.9 UTI SYMPTOMS: ICD-10-CM

## 2022-06-25 LAB
BACTERIA UR CULT: NORMAL
SIGNIFICANT IND 70042: NORMAL
SITE SITE: NORMAL
SOURCE SOURCE: NORMAL

## 2022-08-22 ENCOUNTER — OFFICE VISIT (OUTPATIENT)
Dept: MEDICAL GROUP | Facility: MEDICAL CENTER | Age: 67
End: 2022-08-22
Payer: MEDICARE

## 2022-08-22 VITALS
HEIGHT: 66 IN | TEMPERATURE: 97 F | DIASTOLIC BLOOD PRESSURE: 60 MMHG | WEIGHT: 200.62 LBS | BODY MASS INDEX: 32.24 KG/M2 | RESPIRATION RATE: 17 BRPM | OXYGEN SATURATION: 96 % | HEART RATE: 79 BPM | SYSTOLIC BLOOD PRESSURE: 124 MMHG

## 2022-08-22 DIAGNOSIS — E78.00 PURE HYPERCHOLESTEROLEMIA: Chronic | ICD-10-CM

## 2022-08-22 DIAGNOSIS — R73.03 PREDIABETES: ICD-10-CM

## 2022-08-22 DIAGNOSIS — N39.3 STRESS INCONTINENCE IN FEMALE: ICD-10-CM

## 2022-08-22 DIAGNOSIS — E55.9 VITAMIN D INSUFFICIENCY: Chronic | ICD-10-CM

## 2022-08-22 PROCEDURE — 99214 OFFICE O/P EST MOD 30 MIN: CPT | Performed by: FAMILY MEDICINE

## 2022-08-22 ASSESSMENT — ENCOUNTER SYMPTOMS
CHILLS: 0
PALPITATIONS: 0
FEVER: 0

## 2022-08-22 ASSESSMENT — FIBROSIS 4 INDEX: FIB4 SCORE: 0.83

## 2022-08-22 NOTE — ASSESSMENT & PLAN NOTE
Chronic health problem, stable, continue oxybutynin 10 mg daily..  Counseled regarding hygiene measures, take probiotics, do not do douching

## 2022-08-22 NOTE — PROGRESS NOTES
FAMILY MEDICINE VISIT                                                               Chief complaint::Diagnoses of Stress incontinence in female, Pure hypercholesterolemia, Vitamin D insufficiency, and Prediabetes were pertinent to this visit.    History of present illness: Uzma Gamez is a 67 y.o. female who presented for follow-up.      Problem   Prediabetes    Family history of diabetes, last A1c came back in normal range.    Lab Results   Component Value Date/Time    HBA1C 5.4 12/28/2021 10:41 AM         Stress Incontinence in Female    She is currently on oxybutynin.  Reports that symptoms are controlled with this.  She was seen in urgent care for UTI.     Vitamin D Insufficiency    Recent vitamin D level came back at 43.  She is currently taking vitamin D supplementation.  She has history of osteopenia.  Continue calcium and vitamin D supplementation.     Hyperlipidemia    She is currently taking atorvastatin 20 mg daily.  No side effects with medication, last lipid panel came back in normal range.    Lab Results   Component Value Date/Time    CHOLSTRLTOT 180 12/28/2021 1041    TRIGLYCERIDE 134 12/28/2021 1041    HDL 82 12/28/2021 1041    LDL 71 12/28/2021 1041    CHOLHDLRAT 3.3 02/04/2013 1030             Review of systems:     Review of Systems   Constitutional:  Negative for chills, fever and malaise/fatigue.   Cardiovascular:  Negative for chest pain, palpitations and leg swelling.      Medications and Allergies:     Current Outpatient Medications   Medication Sig Dispense Refill    atorvastatin (LIPITOR) 20 MG Tab TAKE 1 TABLET BY MOUTH AT BEDTIME 90 Tablet 2    Cholecalciferol (VITAMIN D) 2000 UNIT Tab       oxybutynin SR (DITROPAN-XL) 10 MG CR tablet Take 1 Tablet by mouth every day. 90 Tablet 3    Coenzyme Q10 (COQ10) 200 MG Cap       Calcium Carbonate-Vitamin D3 600-400 MG-UNIT Tab       fluticasone (FLONASE) 50 MCG/ACT nasal spray ADMINISTER 1 SPRAY INTO AFFECTED NOSTRIL(S) 2 TIMES A DAY. 16 mL 1  "   gabapentin (NEURONTIN) 600 MG tablet TAKE 1 TABLET BY MOUTH THREE TIMES A  Tab 3    Psyllium (METAMUCIL FIBER PO) Take  by mouth.      Diclofenac Sodium (VOLTAREN) 1 % Gel Apply 2 g to affected area(s) 2 times a day as needed (back pain). 3 Tube 3    Polyethyl Glycol-Propyl Glycol (SYSTANE OP) 1 Drop by Ophthalmic route every morning.      Melatonin 5 MG Cap Take 5 mg by mouth at bedtime as needed.      SUPER B COMPLEX/C PO Take 1 Tab by mouth every morning.       No current facility-administered medications for this visit.          Vitals:    /60 (BP Location: Left arm, Patient Position: Sitting, BP Cuff Size: Adult)   Pulse 79   Temp 36.1 °C (97 °F)   Resp 17   Ht 1.676 m (5' 6\")   Wt 91 kg (200 lb 9.9 oz)   SpO2 96%  Body mass index is 32.38 kg/m².    Physical Exam:     Physical Exam  Constitutional:       General: She is not in acute distress.  HENT:      Head: Normocephalic and atraumatic.   Eyes:      Conjunctiva/sclera: Conjunctivae normal.   Cardiovascular:      Rate and Rhythm: Normal rate and regular rhythm.      Heart sounds: Normal heart sounds. No murmur heard.    No friction rub. No gallop.   Pulmonary:      Effort: Pulmonary effort is normal. No respiratory distress.      Breath sounds: Normal breath sounds. No wheezing or rales.   Musculoskeletal:         General: No deformity.      Cervical back: Neck supple.   Neurological:      Mental Status: She is alert.      Gait: Gait is intact.   Psychiatric:         Mood and Affect: Mood and affect normal.         Judgment: Judgment normal.      Assessment/Plan:    Problem List Items Addressed This Visit       Hyperlipidemia (Chronic)     Chronic health problem, stable, recheck labs, continue same medication regimen.         Relevant Orders    Comp Metabolic Panel    Lipid Profile    Patient identified as having weight management issue.  Appropriate orders and counseling given.    Vitamin D insufficiency (Chronic)     Chronic health " problem, recheck labs, continues vitamin D supplementation.         Relevant Orders    VITAMIN D,25 HYDROXY    Prediabetes     Chronic health problem, stable, recheck labs         Relevant Orders    HEMOGLOBIN A1C    Stress incontinence in female     Chronic health problem, stable, continue oxybutynin 10 mg daily..  Counseled regarding hygiene measures, take probiotics, do not do douching             Please note that this dictation was created using voice recognition software. I have made every reasonable attempt to correct obvious errors, but I expect that there are errors of grammar and possibly content that I did not discover before finalizing the note.    Follow up as needed as patient is going to move from [place].

## 2022-08-25 ENCOUNTER — HOSPITAL ENCOUNTER (OUTPATIENT)
Dept: LAB | Facility: MEDICAL CENTER | Age: 67
End: 2022-08-25
Attending: FAMILY MEDICINE
Payer: MEDICARE

## 2022-08-25 DIAGNOSIS — R73.03 PREDIABETES: ICD-10-CM

## 2022-08-25 DIAGNOSIS — E78.00 PURE HYPERCHOLESTEROLEMIA: Chronic | ICD-10-CM

## 2022-08-25 DIAGNOSIS — E55.9 VITAMIN D INSUFFICIENCY: Chronic | ICD-10-CM

## 2022-08-25 LAB
25(OH)D3 SERPL-MCNC: 44 NG/ML (ref 30–100)
ALBUMIN SERPL BCP-MCNC: 4.4 G/DL (ref 3.2–4.9)
ALBUMIN/GLOB SERPL: 1.4 G/DL
ALP SERPL-CCNC: 63 U/L (ref 30–99)
ALT SERPL-CCNC: 14 U/L (ref 2–50)
ANION GAP SERPL CALC-SCNC: 10 MMOL/L (ref 7–16)
AST SERPL-CCNC: 18 U/L (ref 12–45)
BILIRUB SERPL-MCNC: 1 MG/DL (ref 0.1–1.5)
BUN SERPL-MCNC: 19 MG/DL (ref 8–22)
CALCIUM SERPL-MCNC: 9.2 MG/DL (ref 8.5–10.5)
CHLORIDE SERPL-SCNC: 105 MMOL/L (ref 96–112)
CHOLEST SERPL-MCNC: 160 MG/DL (ref 100–199)
CO2 SERPL-SCNC: 25 MMOL/L (ref 20–33)
CREAT SERPL-MCNC: 0.83 MG/DL (ref 0.5–1.4)
EST. AVERAGE GLUCOSE BLD GHB EST-MCNC: 114 MG/DL
GFR SERPLBLD CREATININE-BSD FMLA CKD-EPI: 77 ML/MIN/1.73 M 2
GLOBULIN SER CALC-MCNC: 3.2 G/DL (ref 1.9–3.5)
GLUCOSE SERPL-MCNC: 108 MG/DL (ref 65–99)
HBA1C MFR BLD: 5.6 % (ref 4–5.6)
HDLC SERPL-MCNC: 92 MG/DL
LDLC SERPL CALC-MCNC: 48 MG/DL
POTASSIUM SERPL-SCNC: 4 MMOL/L (ref 3.6–5.5)
PROT SERPL-MCNC: 7.6 G/DL (ref 6–8.2)
SODIUM SERPL-SCNC: 140 MMOL/L (ref 135–145)
TRIGL SERPL-MCNC: 101 MG/DL (ref 0–149)

## 2022-08-25 PROCEDURE — 80053 COMPREHEN METABOLIC PANEL: CPT

## 2022-08-25 PROCEDURE — 80061 LIPID PANEL: CPT

## 2022-08-25 PROCEDURE — 83036 HEMOGLOBIN GLYCOSYLATED A1C: CPT | Mod: GA

## 2022-08-25 PROCEDURE — 82306 VITAMIN D 25 HYDROXY: CPT

## 2022-08-25 PROCEDURE — 36415 COLL VENOUS BLD VENIPUNCTURE: CPT

## 2022-11-03 ENCOUNTER — PATIENT MESSAGE (OUTPATIENT)
Dept: HEALTH INFORMATION MANAGEMENT | Facility: OTHER | Age: 67
End: 2022-11-03

## 2023-01-12 ENCOUNTER — APPOINTMENT (OUTPATIENT)
Dept: MEDICAL GROUP | Facility: MEDICAL CENTER | Age: 68
End: 2023-01-12
Payer: MEDICARE

## 2023-04-19 NOTE — OR NURSING
1800: Patient woke up from anesthesia in severe pain. MD Frederick called to bedside to reblock medial nerve. Patient did not tolerated well and was very anxious. Medicating patient per anesthesia record.     1820: patient calm and resting in bed.     1840: patient woke in severe pain. remedicated per anesthesia record. 5/10 pain.     Will continue to monitor.   
Assumed care of patient at approx 2006.  Patient alert and oriented x 4. See flowsheets for VS.  Pain is rated 0/10.     Call light and personal belongings within reach. Gurney in lowest position. Monitor alarms set appropriately.    Pt has CHASE, On-Q, and Hemovac in place.  Dressing is CDI. See flowsheets for detailed wound documentation.           
Discharge information reviewed with patient and responsible adult. No questions or concerns at this time.     IV discontinued.   Educated pt and family on emptying Hemovac,  and provided output sheet. Educated pt and family on when to D/C Hemovac, on-Q and CHASE and when to F/U with MD.Pt and family verbalized understanding.     See vital sign flowsheets  for discharge details.  
GABINO packet and Rx given to pt's , [place], in pre-op.  
Pt AA/Ox4. VSS. Dressing and splint to LLE, CDI. Pt denies pain at this time. Pain medications given. On-Q pump in place. Nerve blocks to LLE administered by anesthesiologist. LLE elevated on pillows and ice packs applied. Pt denies nausea or vomiting. Pt voided x1 using the bedpan. Pt expressed to RN that she wants to go home. Report given to FRANKI Hernandez.    Pt via jerry was transferred to PACU2 at 2005. Pt's family members updated.  
Detail Level: Detailed
Quality 110: Preventive Care And Screening: Influenza Immunization: Influenza Immunization Administered during Influenza season

## 2024-09-24 NOTE — PROGRESS NOTES
Subjective:   Uzma Gamez is a 65 y.o. female here today for nerve pain    For the past 5 days patient has had nerve pain behind her left ear that is constant and severe at times.  She states the nerve pain feels like a electric jolt going off behind her ear.  She was prescribed gabapentin 300 mg, which did suppress the pain but wore off after 5 to 6 hours.  She has history of shingles.  She just noticed a new rash on her left side upper forehead and superior to her left ear in her scalp.    Current medicines (including changes today)  Current Outpatient Medications   Medication Sig Dispense Refill   • estradiol (ESTRACE VAGINAL) 0.1 MG/GM vaginal cream Insert  in vagina every day.     • Psyllium (METAMUCIL FIBER PO) Take  by mouth.     • valacyclovir (VALTREX) 1 GM Tab Take 1 Tab by mouth 3 times a day for 7 days. 21 Tab 0   • gabapentin (NEURONTIN) 600 MG tablet Take 1 Tab by mouth 3 times a day. 90 Tab 2   • predniSONE (DELTASONE) 20 MG Tab Take 2 Tabs by mouth every day for 5 days. 10 Tab 0   • Diclofenac Sodium (VOLTAREN) 1 % Gel Apply 2 g to affected area(s) 2 times a day as needed (back pain). 3 Tube 3   • atorvastatin (LIPITOR) 20 MG Tab Take 1 Tab by mouth every bedtime. 90 Tab 3   • fluticasone (FLONASE) 50 MCG/ACT nasal spray Spray 1 Spray in nose 2 times a day. 1 Bottle 5   • Polyethyl Glycol-Propyl Glycol (SYSTANE OP) 1 Drop by Ophthalmic route every morning.     • Melatonin 5 MG Cap Take 5 mg by mouth at bedtime as needed.     • SUPER B COMPLEX/C PO Take 1 Tab by mouth every morning.     • Omega-3 Fatty Acids (OMEGA 3 PO) Take 1,500 mg by mouth every morning.       No current facility-administered medications for this visit.      She  has a past medical history of Anesthesia, Arthritis, Bilateral cataracts, Fall at home, initial encounter (1/11/2019), H/O: hysterectomy (6/5/2009), High cholesterol, Plantar fasciitis (6/5/2009), and Previous back surgery (6/5/2009).    ROS   No fever, no internal  "ear pain       Objective:     /72 (BP Location: Right arm, Patient Position: Sitting, BP Cuff Size: Adult)   Pulse 80   Temp 36.3 °C (97.3 °F) (Temporal)   Ht 1.676 m (5' 6\")   Wt 88.9 kg (196 lb)   SpO2 96%  Body mass index is 31.64 kg/m².   Physical Exam:  Constitutional: Alert, no distress.  Skin: Warm, dry, good turgor.  Macular papular rash and small cluster on left side of forehead and superior to left ear and scalp.  Eye: Equal, round and reactive, conjunctiva clear, lids normal.  Psych: Alert and oriented x3, normal affect and mood.        Assessment and Plan:   The following treatment plan was discussed    1. Herpes zoster without complication  New problem.  Appears to be shingles related pain and rash.  Prescription for Valtrex and prednisone.  Will increase gabapentin from 300 mg daily to 600 mg 3 times daily to help with pain.  - valacyclovir (VALTREX) 1 GM Tab; Take 1 Tab by mouth 3 times a day for 7 days.  Dispense: 21 Tab; Refill: 0  - gabapentin (NEURONTIN) 600 MG tablet; Take 1 Tab by mouth 3 times a day.  Dispense: 90 Tab; Refill: 2  - predniSONE (DELTASONE) 20 MG Tab; Take 2 Tabs by mouth every day for 5 days.  Dispense: 10 Tab; Refill: 0      Followup: Return if symptoms worsen or fail to improve.         " Attending to bill

## (undated) DEVICE — DRAPE LARGE 3 QUARTER - (20/CA)

## (undated) DEVICE — STERI STRIP COMPOUND BENZOIN - TINCTURE 0.6ML WITH APPLICATOR (40EA/BX)

## (undated) DEVICE — SET EXTENSION WITH 2 PORTS (48EA/CA) ***PART #2C8610 IS A SUBSTITUTE*****

## (undated) DEVICE — BLADE SURGICAL #15 - (50/BX 3BX/CA)

## (undated) DEVICE — SENSOR SPO2 NEO LNCS ADHESIVE (20/BX) SEE USER NOTES

## (undated) DEVICE — GLOVE BIOGEL PI INDICATOR SZ 7.0 SURGICAL PF LF - (50/BX 4BX/CA)

## (undated) DEVICE — CHLORAPREP 26 ML APPLICATOR - ORANGE TINT(25/CA)

## (undated) DEVICE — CANISTER SUCTION 3000ML MECHANICAL FILTER AUTO SHUTOFF MEDI-VAC NONSTERILE LF DISP  (40EA/CA)

## (undated) DEVICE — GOWN WARMING STANDARD FLEX - (30/CA)

## (undated) DEVICE — DRAPE STRLE REG TOWEL 18X24 - (10/BX 4BX/CA)"

## (undated) DEVICE — BLADE SAW RECIPROCATING 8.0 X 0.98 X 43MM

## (undated) DEVICE — SUTURE GENERAL

## (undated) DEVICE — SLEEVE, VASO, THIGH, MED

## (undated) DEVICE — GLOVE BIOGEL SZ 6.5 SURGICAL PF LTX (50PR/BX 4BX/CA)

## (undated) DEVICE — SUTURE 3-0 VICRYL PLUS SH - 8X 18 INCH (12/BX)

## (undated) DEVICE — PADDING CAST 6 IN STERILE - 6 X 4 YDS (24/CA)

## (undated) DEVICE — SODIUM CHL IRRIGATION 0.9% 1000ML (12EA/CA)

## (undated) DEVICE — MASK, LARYNGEAL AIRWAY #4

## (undated) DEVICE — GLOVE BIOGEL INDICATOR SZ 7.5 SURGICAL PF LTX - (50PR/BX 4BX/CA)

## (undated) DEVICE — CONTAINER SPECIMEN BAG OR - STERILE 4 OZ W/LID (100EA/CA)

## (undated) DEVICE — GLOVE BIOGEL ECLIPSE PF LATEX SIZE 8.0  (50PR/BX)

## (undated) DEVICE — PROTECTOR ULNA NERVE - (36PR/CA)

## (undated) DEVICE — TOURNIQUET CUFF 34 X 4 ONE PORT DISP - STERILE (10/BX)

## (undated) DEVICE — KIT ANESTHESIA W/CIRCUIT & 3/LT BAG W/FILTER (20EA/CA)

## (undated) DEVICE — BLADE SURGICAL CLIPPER - (50EA/CA)

## (undated) DEVICE — GLOVE BIOGEL SZ 7.5 SURGICAL PF LTX - (50PR/BX 4BX/CA)

## (undated) DEVICE — TRAY E CATH W/INDWELLING CATHETER W/STIMULATION CABLE AND FIXOCATH 18G X 3.25 ---ORDER 10 EACHS----"

## (undated) DEVICE — SET LEADWIRE 5 LEAD BEDSIDE DISPOSABLE ECG (1SET OF 5/EA)

## (undated) DEVICE — KIT EVACUATER 3 SPRING PVC LF 1/8 DRAIN SIZE (10EA/CA)"

## (undated) DEVICE — ELECTRODE DUAL RETURN W/ CORD - (50/PK)

## (undated) DEVICE — BLOCK

## (undated) DEVICE — DRESSING 3X8 ADAPTIC GAUZE - NON-ADHERING (36/PK 6PK/BX)

## (undated) DEVICE — TUBING CLEARLINK DUO-VENT - C-FLO (48EA/CA)

## (undated) DEVICE — MASK ANESTHESIA ADULT  - (100/CA)

## (undated) DEVICE — HEAD HOLDER JUNIOR/ADULT

## (undated) DEVICE — GLOVE BIOGEL INDICATOR SZ 6.5 SURGICAL PF LTX - (50PR/BX 4BX/CA)

## (undated) DEVICE — PACK LOWER EXTREMITY - (2/CA)

## (undated) DEVICE — KIT ROOM DECONTAMINATION

## (undated) DEVICE — PAD LAP STERILE 18 X 18 - (5/PK 40PK/CA)

## (undated) DEVICE — BLADE SAW OSCILLATING 8.0 X 1.27 X 70MM

## (undated) DEVICE — GLOVE BIOGEL PI INDICATOR SZ 8.0 SURGICAL PF LF -(50/BX 4BX/CA)

## (undated) DEVICE — NEPTUNE 4 PORT MANIFOLD - (20/PK)

## (undated) DEVICE — ELECTRODE 850 FOAM ADHESIVE - HYDROGEL RADIOTRNSPRNT (50/PK)

## (undated) DEVICE — COTTON ROLL 1 POUND BIOSEAL - (5RL/CA)

## (undated) DEVICE — WRAP CO-FLEX 4IN X 5YD STERIL - SELF-ADHERENT (18/CA)

## (undated) DEVICE — SUTURE 3-0 ETHILON PS-1 (36PK/BX)

## (undated) DEVICE — DISPOSABLE WOUND VAC PICO 10 X 20 CM - WOUND CARE (3/CA)

## (undated) DEVICE — GLOVE SZ 7 BIOGEL PI MICRO - PF LF (50PR/BX 4BX/CA)

## (undated) DEVICE — GLOVE BIOGEL ECLIPSE  PF LATEX SIZE 6.5 (50PR/BX)

## (undated) DEVICE — GUIDE WIRE

## (undated) DEVICE — SUCTION INSTRUMENT YANKAUER BULBOUS TIP W/O VENT (50EA/CA)

## (undated) DEVICE — GOWN SURGEONS X-LARGE - DISP. (30/CA)

## (undated) DEVICE — DRAPE C-ARM LARGE 41IN X 74 IN - (10/BX 2BX/CA)

## (undated) DEVICE — CLOSURE SKIN STRIP 1/2 X 4 IN - (STERI STRIP) (50/BX 4BX/CA)

## (undated) DEVICE — SYRINGE ASEPTO - (50EA/CA

## (undated) DEVICE — LACTATED RINGERS INJ 1000 ML - (14EA/CA 60CA/PF)

## (undated) DEVICE — PIN

## (undated) DEVICE — DRAPE C ARMOR (12EA/CA)

## (undated) DEVICE — TOWELS CLOTH SURGICAL - (4/PK 20PK/CA)

## (undated) DEVICE — STOCKINET BIAS 6 IN STERILE - (20/CA)

## (undated) DEVICE — BANDAGE ELASTIC 6 HONEYCOMB - 6X5YD LF (20/CA)"

## (undated) DEVICE — BLADE SAGITTAL SAW 9.4MM X 25.5MM X .4MM FINE TOOTH (1/EA)

## (undated) DEVICE — DRILL

## (undated) DEVICE — DRESSING ABDOMINAL PAD STERILE 8 X 10" (360EA/CA)"

## (undated) DEVICE — SPLINT PLASTER 5 IN X 30 IN - (50EA/BX 6BX/CA)